# Patient Record
Sex: MALE | Race: WHITE | Employment: UNEMPLOYED | URBAN - METROPOLITAN AREA
[De-identification: names, ages, dates, MRNs, and addresses within clinical notes are randomized per-mention and may not be internally consistent; named-entity substitution may affect disease eponyms.]

---

## 2024-08-11 ENCOUNTER — APPOINTMENT (OUTPATIENT)
Dept: CT IMAGING | Age: 54
End: 2024-08-11
Payer: COMMERCIAL

## 2024-08-11 ENCOUNTER — HOSPITAL ENCOUNTER (INPATIENT)
Age: 54
LOS: 4 days | Discharge: HOME | End: 2024-08-15
Attending: EMERGENCY MEDICINE | Admitting: INTERNAL MEDICINE
Payer: COMMERCIAL

## 2024-08-11 ENCOUNTER — APPOINTMENT (OUTPATIENT)
Dept: GENERAL RADIOLOGY | Age: 54
End: 2024-08-11
Payer: COMMERCIAL

## 2024-08-11 DIAGNOSIS — N17.9 ACUTE RENAL FAILURE, UNSPECIFIED ACUTE RENAL FAILURE TYPE (HCC): Primary | ICD-10-CM

## 2024-08-11 DIAGNOSIS — K85.20 ALCOHOL-INDUCED ACUTE PANCREATITIS, UNSPECIFIED COMPLICATION STATUS: ICD-10-CM

## 2024-08-11 DIAGNOSIS — F10.930 ALCOHOL WITHDRAWAL SYNDROME WITHOUT COMPLICATION (HCC): ICD-10-CM

## 2024-08-11 LAB
ALBUMIN SERPL-MCNC: 4.1 G/DL (ref 3.5–5.2)
ALP SERPL-CCNC: 82 U/L (ref 40–129)
ALT SERPL-CCNC: 34 U/L (ref 0–40)
AMMONIA PLAS-SCNC: 20 UMOL/L (ref 16–60)
AMPHET UR QL SCN: NEGATIVE
ANION GAP SERPL CALCULATED.3IONS-SCNC: 25 MMOL/L (ref 7–16)
APAP SERPL-MCNC: <5 UG/ML (ref 10–30)
AST SERPL-CCNC: 33 U/L (ref 0–39)
B-OH-BUTYR SERPL-MCNC: 3.24 MMOL/L (ref 0.02–0.27)
B-OH-BUTYR SERPL-MCNC: 4.11 MMOL/L (ref 0.02–0.27)
BARBITURATES UR QL SCN: NEGATIVE
BASOPHILS # BLD: 0.02 K/UL (ref 0–0.2)
BASOPHILS NFR BLD: 0 % (ref 0–2)
BENZODIAZ UR QL: NEGATIVE
BILIRUB DIRECT SERPL-MCNC: 0.9 MG/DL (ref 0–0.3)
BILIRUB INDIRECT SERPL-MCNC: 0.6 MG/DL (ref 0–1)
BILIRUB SERPL-MCNC: 1.5 MG/DL (ref 0–1.2)
BILIRUB UR QL STRIP: ABNORMAL
BUN SERPL-MCNC: 76 MG/DL (ref 6–20)
BUPRENORPHINE UR QL: NEGATIVE
CALCIUM SERPL-MCNC: 8.3 MG/DL (ref 8.6–10.2)
CANNABINOIDS UR QL SCN: NEGATIVE
CHLORIDE SERPL-SCNC: 82 MMOL/L (ref 98–107)
CK SERPL-CCNC: 140 U/L (ref 20–200)
CLARITY UR: CLEAR
CO2 SERPL-SCNC: 22 MMOL/L (ref 22–29)
COCAINE UR QL SCN: NEGATIVE
COLOR UR: YELLOW
CREAT SERPL-MCNC: 2 MG/DL (ref 0.7–1.2)
D-DIMER QUANTITATIVE: 1256 NG/ML DDU (ref 0–230)
EKG ATRIAL RATE: 81 BPM
EKG P AXIS: 28 DEGREES
EKG P-R INTERVAL: 144 MS
EKG Q-T INTERVAL: 414 MS
EKG QRS DURATION: 80 MS
EKG QTC CALCULATION (BAZETT): 480 MS
EKG R AXIS: 12 DEGREES
EKG T AXIS: 2 DEGREES
EKG VENTRICULAR RATE: 81 BPM
EOSINOPHIL # BLD: 0.03 K/UL (ref 0.05–0.5)
EOSINOPHILS RELATIVE PERCENT: 0 % (ref 0–6)
ERYTHROCYTE [DISTWIDTH] IN BLOOD BY AUTOMATED COUNT: 12 % (ref 11.5–15)
ETHANOLAMINE SERPL-MCNC: <10 MG/DL (ref 0–0.08)
FENTANYL UR QL: NEGATIVE
GFR, ESTIMATED: 40 ML/MIN/1.73M2
GLUCOSE BLD-MCNC: 159 MG/DL (ref 74–99)
GLUCOSE SERPL-MCNC: 264 MG/DL (ref 74–99)
GLUCOSE UR STRIP-MCNC: 250 MG/DL
HCT VFR BLD AUTO: 32.3 % (ref 37–54)
HGB BLD-MCNC: 11.7 G/DL (ref 12.5–16.5)
HGB UR QL STRIP.AUTO: ABNORMAL
IMM GRANULOCYTES # BLD AUTO: 0.09 K/UL (ref 0–0.58)
IMM GRANULOCYTES NFR BLD: 1 % (ref 0–5)
INR PPP: 1
KETONES UR STRIP-MCNC: 15 MG/DL
LEUKOCYTE ESTERASE UR QL STRIP: NEGATIVE
LIPASE SERPL-CCNC: 939 U/L (ref 13–60)
LYMPHOCYTES NFR BLD: 0.63 K/UL (ref 1.5–4)
LYMPHOCYTES RELATIVE PERCENT: 8 % (ref 20–42)
MAGNESIUM SERPL-MCNC: 1.9 MG/DL (ref 1.6–2.6)
MCH RBC QN AUTO: 34.6 PG (ref 26–35)
MCHC RBC AUTO-ENTMCNC: 36.2 G/DL (ref 32–34.5)
MCV RBC AUTO: 95.6 FL (ref 80–99.9)
METHADONE UR QL: NEGATIVE
MONOCYTES NFR BLD: 1.1 K/UL (ref 0.1–0.95)
MONOCYTES NFR BLD: 14 % (ref 2–12)
NEUTROPHILS NFR BLD: 77 % (ref 43–80)
NEUTS SEG NFR BLD: 6.19 K/UL (ref 1.8–7.3)
NITRITE UR QL STRIP: NEGATIVE
OPIATES UR QL SCN: NEGATIVE
OXYCODONE UR QL SCN: NEGATIVE
PARTIAL THROMBOPLASTIN TIME: 24.1 SEC (ref 24.5–35.1)
PCP UR QL SCN: NEGATIVE
PH UR STRIP: 6 [PH] (ref 5–9)
PH VENOUS: 7.35 (ref 7.35–7.45)
PLATELET # BLD AUTO: 145 K/UL (ref 130–450)
PMV BLD AUTO: 10.1 FL (ref 7–12)
POTASSIUM SERPL-SCNC: 3.1 MMOL/L (ref 3.5–5)
PROT SERPL-MCNC: 7.2 G/DL (ref 6.4–8.3)
PROT UR STRIP-MCNC: ABNORMAL MG/DL
PROTHROMBIN TIME: 10.4 SEC (ref 9.3–12.4)
RBC # BLD AUTO: 3.38 M/UL (ref 3.8–5.8)
RBC #/AREA URNS HPF: NORMAL /HPF
SALICYLATES SERPL-MCNC: <0.3 MG/DL (ref 0–30)
SODIUM SERPL-SCNC: 129 MMOL/L (ref 132–146)
SP GR UR STRIP: 1.01 (ref 1–1.03)
TEST INFORMATION: NORMAL
UROBILINOGEN UR STRIP-ACNC: 1 EU/DL (ref 0–1)
WBC #/AREA URNS HPF: NORMAL /HPF
WBC OTHER # BLD: 8.1 K/UL (ref 4.5–11.5)

## 2024-08-11 PROCEDURE — 6360000002 HC RX W HCPCS: Performed by: EMERGENCY MEDICINE

## 2024-08-11 PROCEDURE — 71045 X-RAY EXAM CHEST 1 VIEW: CPT

## 2024-08-11 PROCEDURE — 71275 CT ANGIOGRAPHY CHEST: CPT

## 2024-08-11 PROCEDURE — G0480 DRUG TEST DEF 1-7 CLASSES: HCPCS

## 2024-08-11 PROCEDURE — 6370000000 HC RX 637 (ALT 250 FOR IP): Performed by: INTERNAL MEDICINE

## 2024-08-11 PROCEDURE — 6370000000 HC RX 637 (ALT 250 FOR IP): Performed by: EMERGENCY MEDICINE

## 2024-08-11 PROCEDURE — 80179 DRUG ASSAY SALICYLATE: CPT

## 2024-08-11 PROCEDURE — 6360000002 HC RX W HCPCS: Performed by: INTERNAL MEDICINE

## 2024-08-11 PROCEDURE — 6360000004 HC RX CONTRAST MEDICATION: Performed by: RADIOLOGY

## 2024-08-11 PROCEDURE — 2580000003 HC RX 258: Performed by: INTERNAL MEDICINE

## 2024-08-11 PROCEDURE — 99285 EMERGENCY DEPT VISIT HI MDM: CPT

## 2024-08-11 PROCEDURE — 2500000003 HC RX 250 WO HCPCS: Performed by: EMERGENCY MEDICINE

## 2024-08-11 PROCEDURE — 85610 PROTHROMBIN TIME: CPT

## 2024-08-11 PROCEDURE — 82140 ASSAY OF AMMONIA: CPT

## 2024-08-11 PROCEDURE — 82010 KETONE BODYS QUAN: CPT

## 2024-08-11 PROCEDURE — 82800 BLOOD PH: CPT

## 2024-08-11 PROCEDURE — 80307 DRUG TEST PRSMV CHEM ANLYZR: CPT

## 2024-08-11 PROCEDURE — 81001 URINALYSIS AUTO W/SCOPE: CPT

## 2024-08-11 PROCEDURE — 93010 ELECTROCARDIOGRAM REPORT: CPT | Performed by: INTERNAL MEDICINE

## 2024-08-11 PROCEDURE — 96374 THER/PROPH/DIAG INJ IV PUSH: CPT

## 2024-08-11 PROCEDURE — 83690 ASSAY OF LIPASE: CPT

## 2024-08-11 PROCEDURE — 80143 DRUG ASSAY ACETAMINOPHEN: CPT

## 2024-08-11 PROCEDURE — 85379 FIBRIN DEGRADATION QUANT: CPT

## 2024-08-11 PROCEDURE — 99223 1ST HOSP IP/OBS HIGH 75: CPT | Performed by: INTERNAL MEDICINE

## 2024-08-11 PROCEDURE — 85025 COMPLETE CBC W/AUTO DIFF WBC: CPT

## 2024-08-11 PROCEDURE — 2060000000 HC ICU INTERMEDIATE R&B

## 2024-08-11 PROCEDURE — 2580000003 HC RX 258: Performed by: EMERGENCY MEDICINE

## 2024-08-11 PROCEDURE — 74177 CT ABD & PELVIS W/CONTRAST: CPT

## 2024-08-11 PROCEDURE — 82248 BILIRUBIN DIRECT: CPT

## 2024-08-11 PROCEDURE — 82550 ASSAY OF CK (CPK): CPT

## 2024-08-11 PROCEDURE — 85730 THROMBOPLASTIN TIME PARTIAL: CPT

## 2024-08-11 PROCEDURE — 93005 ELECTROCARDIOGRAM TRACING: CPT | Performed by: EMERGENCY MEDICINE

## 2024-08-11 PROCEDURE — 96375 TX/PRO/DX INJ NEW DRUG ADDON: CPT

## 2024-08-11 PROCEDURE — 80053 COMPREHEN METABOLIC PANEL: CPT

## 2024-08-11 PROCEDURE — 82962 GLUCOSE BLOOD TEST: CPT

## 2024-08-11 PROCEDURE — 83735 ASSAY OF MAGNESIUM: CPT

## 2024-08-11 RX ORDER — ENOXAPARIN SODIUM 100 MG/ML
40 INJECTION SUBCUTANEOUS DAILY
Status: DISCONTINUED | OUTPATIENT
Start: 2024-08-11 | End: 2024-08-15 | Stop reason: HOSPADM

## 2024-08-11 RX ORDER — PHENOBARBITAL SODIUM 65 MG/ML
32.5 INJECTION, SOLUTION INTRAMUSCULAR; INTRAVENOUS EVERY 6 HOURS PRN
Status: DISCONTINUED | OUTPATIENT
Start: 2024-08-12 | End: 2024-08-11

## 2024-08-11 RX ORDER — SODIUM CHLORIDE 0.9 % (FLUSH) 0.9 %
5-40 SYRINGE (ML) INJECTION PRN
Status: DISCONTINUED | OUTPATIENT
Start: 2024-08-11 | End: 2024-08-15 | Stop reason: HOSPADM

## 2024-08-11 RX ORDER — ONDANSETRON 4 MG/1
4 TABLET, ORALLY DISINTEGRATING ORAL EVERY 8 HOURS PRN
Status: DISCONTINUED | OUTPATIENT
Start: 2024-08-11 | End: 2024-08-15 | Stop reason: HOSPADM

## 2024-08-11 RX ORDER — LORAZEPAM 2 MG/ML
1 INJECTION INTRAMUSCULAR
Status: DISCONTINUED | OUTPATIENT
Start: 2024-08-11 | End: 2024-08-15 | Stop reason: HOSPADM

## 2024-08-11 RX ORDER — LORAZEPAM 1 MG/1
3 TABLET ORAL
Status: DISCONTINUED | OUTPATIENT
Start: 2024-08-11 | End: 2024-08-15 | Stop reason: HOSPADM

## 2024-08-11 RX ORDER — FOLIC ACID 1 MG/1
1 TABLET ORAL ONCE
Status: COMPLETED | OUTPATIENT
Start: 2024-08-11 | End: 2024-08-11

## 2024-08-11 RX ORDER — 0.9 % SODIUM CHLORIDE 0.9 %
1000 INTRAVENOUS SOLUTION INTRAVENOUS ONCE
Status: COMPLETED | OUTPATIENT
Start: 2024-08-11 | End: 2024-08-11

## 2024-08-11 RX ORDER — DEXTROSE MONOHYDRATE 100 MG/ML
INJECTION, SOLUTION INTRAVENOUS CONTINUOUS PRN
Status: DISCONTINUED | OUTPATIENT
Start: 2024-08-11 | End: 2024-08-15 | Stop reason: HOSPADM

## 2024-08-11 RX ORDER — PHENOBARBITAL SODIUM 65 MG/ML
65 INJECTION, SOLUTION INTRAMUSCULAR; INTRAVENOUS EVERY 6 HOURS
Status: COMPLETED | OUTPATIENT
Start: 2024-08-11 | End: 2024-08-12

## 2024-08-11 RX ORDER — PHENOBARBITAL SODIUM 65 MG/ML
32.5 INJECTION, SOLUTION INTRAMUSCULAR; INTRAVENOUS EVERY 6 HOURS
Status: COMPLETED | OUTPATIENT
Start: 2024-08-12 | End: 2024-08-13

## 2024-08-11 RX ORDER — LORAZEPAM 1 MG/1
1 TABLET ORAL
Status: DISCONTINUED | OUTPATIENT
Start: 2024-08-11 | End: 2024-08-15 | Stop reason: HOSPADM

## 2024-08-11 RX ORDER — MAGNESIUM SULFATE IN WATER 40 MG/ML
2000 INJECTION, SOLUTION INTRAVENOUS PRN
Status: DISCONTINUED | OUTPATIENT
Start: 2024-08-11 | End: 2024-08-15 | Stop reason: HOSPADM

## 2024-08-11 RX ORDER — THIAMINE HYDROCHLORIDE 100 MG/ML
100 INJECTION, SOLUTION INTRAMUSCULAR; INTRAVENOUS DAILY
Status: DISCONTINUED | OUTPATIENT
Start: 2024-08-11 | End: 2024-08-15 | Stop reason: HOSPADM

## 2024-08-11 RX ORDER — POLYETHYLENE GLYCOL 3350 17 G/17G
17 POWDER, FOR SOLUTION ORAL DAILY PRN
Status: DISCONTINUED | OUTPATIENT
Start: 2024-08-11 | End: 2024-08-15 | Stop reason: HOSPADM

## 2024-08-11 RX ORDER — ONDANSETRON 2 MG/ML
4 INJECTION INTRAMUSCULAR; INTRAVENOUS EVERY 6 HOURS PRN
Status: DISCONTINUED | OUTPATIENT
Start: 2024-08-11 | End: 2024-08-15 | Stop reason: HOSPADM

## 2024-08-11 RX ORDER — LORAZEPAM 2 MG/ML
1 INJECTION INTRAMUSCULAR ONCE
Status: COMPLETED | OUTPATIENT
Start: 2024-08-11 | End: 2024-08-11

## 2024-08-11 RX ORDER — GLUCAGON 1 MG/ML
1 KIT INJECTION PRN
Status: DISCONTINUED | OUTPATIENT
Start: 2024-08-11 | End: 2024-08-15 | Stop reason: HOSPADM

## 2024-08-11 RX ORDER — SODIUM CHLORIDE 0.9 % (FLUSH) 0.9 %
5-40 SYRINGE (ML) INJECTION EVERY 12 HOURS SCHEDULED
Status: DISCONTINUED | OUTPATIENT
Start: 2024-08-11 | End: 2024-08-15 | Stop reason: HOSPADM

## 2024-08-11 RX ORDER — MORPHINE SULFATE 4 MG/ML
4 INJECTION, SOLUTION INTRAMUSCULAR; INTRAVENOUS
Status: DISCONTINUED | OUTPATIENT
Start: 2024-08-11 | End: 2024-08-15 | Stop reason: HOSPADM

## 2024-08-11 RX ORDER — PHENOBARBITAL SODIUM 65 MG/ML
32.5 INJECTION, SOLUTION INTRAMUSCULAR; INTRAVENOUS EVERY 12 HOURS
Status: COMPLETED | OUTPATIENT
Start: 2024-08-13 | End: 2024-08-14

## 2024-08-11 RX ORDER — PHENOBARBITAL SODIUM 65 MG/ML
16.2 INJECTION, SOLUTION INTRAMUSCULAR; INTRAVENOUS EVERY 6 HOURS PRN
Status: DISCONTINUED | OUTPATIENT
Start: 2024-08-14 | End: 2024-08-11

## 2024-08-11 RX ORDER — PHENOBARBITAL SODIUM 65 MG/ML
65 INJECTION, SOLUTION INTRAMUSCULAR; INTRAVENOUS EVERY 6 HOURS PRN
Status: DISCONTINUED | OUTPATIENT
Start: 2024-08-11 | End: 2024-08-11

## 2024-08-11 RX ORDER — ACETAMINOPHEN 325 MG/1
650 TABLET ORAL EVERY 6 HOURS PRN
Status: DISCONTINUED | OUTPATIENT
Start: 2024-08-11 | End: 2024-08-15 | Stop reason: HOSPADM

## 2024-08-11 RX ORDER — CHLORPROMAZINE HYDROCHLORIDE 25 MG/1
25 TABLET, FILM COATED ORAL 3 TIMES DAILY
Status: COMPLETED | OUTPATIENT
Start: 2024-08-11 | End: 2024-08-13

## 2024-08-11 RX ORDER — LORAZEPAM 2 MG/ML
3 INJECTION INTRAMUSCULAR
Status: DISCONTINUED | OUTPATIENT
Start: 2024-08-11 | End: 2024-08-15 | Stop reason: HOSPADM

## 2024-08-11 RX ORDER — LORAZEPAM 1 MG/1
2 TABLET ORAL
Status: DISCONTINUED | OUTPATIENT
Start: 2024-08-11 | End: 2024-08-15 | Stop reason: HOSPADM

## 2024-08-11 RX ORDER — SODIUM CHLORIDE 9 MG/ML
INJECTION, SOLUTION INTRAVENOUS PRN
Status: DISCONTINUED | OUTPATIENT
Start: 2024-08-11 | End: 2024-08-15 | Stop reason: HOSPADM

## 2024-08-11 RX ORDER — PHENOBARBITAL SODIUM 65 MG/ML
16.2 INJECTION, SOLUTION INTRAMUSCULAR; INTRAVENOUS EVERY 12 HOURS
Status: DISCONTINUED | OUTPATIENT
Start: 2024-08-14 | End: 2024-08-14

## 2024-08-11 RX ORDER — ONDANSETRON 2 MG/ML
4 INJECTION INTRAMUSCULAR; INTRAVENOUS ONCE
Status: COMPLETED | OUTPATIENT
Start: 2024-08-11 | End: 2024-08-11

## 2024-08-11 RX ORDER — POTASSIUM CHLORIDE 7.45 MG/ML
10 INJECTION INTRAVENOUS PRN
Status: DISCONTINUED | OUTPATIENT
Start: 2024-08-11 | End: 2024-08-15 | Stop reason: HOSPADM

## 2024-08-11 RX ORDER — LORAZEPAM 1 MG/1
4 TABLET ORAL
Status: DISCONTINUED | OUTPATIENT
Start: 2024-08-11 | End: 2024-08-15 | Stop reason: HOSPADM

## 2024-08-11 RX ORDER — LORAZEPAM 2 MG/ML
2 INJECTION INTRAMUSCULAR
Status: DISCONTINUED | OUTPATIENT
Start: 2024-08-11 | End: 2024-08-15 | Stop reason: HOSPADM

## 2024-08-11 RX ORDER — INSULIN LISPRO 100 [IU]/ML
0-4 INJECTION, SOLUTION INTRAVENOUS; SUBCUTANEOUS NIGHTLY
Status: DISCONTINUED | OUTPATIENT
Start: 2024-08-11 | End: 2024-08-15 | Stop reason: HOSPADM

## 2024-08-11 RX ORDER — GAUZE BANDAGE 2" X 2"
100 BANDAGE TOPICAL ONCE
Status: COMPLETED | OUTPATIENT
Start: 2024-08-11 | End: 2024-08-11

## 2024-08-11 RX ORDER — LORAZEPAM 2 MG/ML
4 INJECTION INTRAMUSCULAR
Status: DISCONTINUED | OUTPATIENT
Start: 2024-08-11 | End: 2024-08-15 | Stop reason: HOSPADM

## 2024-08-11 RX ORDER — SODIUM CHLORIDE AND POTASSIUM CHLORIDE 300; 900 MG/100ML; MG/100ML
INJECTION, SOLUTION INTRAVENOUS CONTINUOUS
Status: DISCONTINUED | OUTPATIENT
Start: 2024-08-11 | End: 2024-08-12

## 2024-08-11 RX ORDER — MORPHINE SULFATE 2 MG/ML
2 INJECTION, SOLUTION INTRAMUSCULAR; INTRAVENOUS
Status: DISCONTINUED | OUTPATIENT
Start: 2024-08-11 | End: 2024-08-15 | Stop reason: HOSPADM

## 2024-08-11 RX ORDER — POTASSIUM CHLORIDE 20 MEQ/1
40 TABLET, EXTENDED RELEASE ORAL PRN
Status: DISCONTINUED | OUTPATIENT
Start: 2024-08-11 | End: 2024-08-15 | Stop reason: HOSPADM

## 2024-08-11 RX ORDER — ACETAMINOPHEN 325 MG/1
650 TABLET ORAL EVERY 4 HOURS PRN
Status: DISCONTINUED | OUTPATIENT
Start: 2024-08-11 | End: 2024-08-15 | Stop reason: HOSPADM

## 2024-08-11 RX ORDER — ACETAMINOPHEN 650 MG/1
650 SUPPOSITORY RECTAL EVERY 6 HOURS PRN
Status: DISCONTINUED | OUTPATIENT
Start: 2024-08-11 | End: 2024-08-15 | Stop reason: HOSPADM

## 2024-08-11 RX ORDER — INSULIN LISPRO 100 [IU]/ML
0-4 INJECTION, SOLUTION INTRAVENOUS; SUBCUTANEOUS
Status: DISCONTINUED | OUTPATIENT
Start: 2024-08-12 | End: 2024-08-15 | Stop reason: HOSPADM

## 2024-08-11 RX ORDER — GUAIFENESIN 200 MG/10ML
200 LIQUID ORAL ONCE
Status: COMPLETED | OUTPATIENT
Start: 2024-08-11 | End: 2024-08-11

## 2024-08-11 RX ADMIN — SODIUM CHLORIDE, PRESERVATIVE FREE 10 ML: 5 INJECTION INTRAVENOUS at 21:16

## 2024-08-11 RX ADMIN — PHENOBARBITAL SODIUM 65 MG: 65 INJECTION, SOLUTION INTRAMUSCULAR; INTRAVENOUS at 23:49

## 2024-08-11 RX ADMIN — SODIUM CHLORIDE 1000 ML: 9 INJECTION, SOLUTION INTRAVENOUS at 10:55

## 2024-08-11 RX ADMIN — ENOXAPARIN SODIUM 40 MG: 100 INJECTION SUBCUTANEOUS at 19:09

## 2024-08-11 RX ADMIN — SODIUM CHLORIDE 1000 ML: 9 INJECTION, SOLUTION INTRAVENOUS at 09:11

## 2024-08-11 RX ADMIN — POTASSIUM CHLORIDE AND SODIUM CHLORIDE: 900; 300 INJECTION, SOLUTION INTRAVENOUS at 14:14

## 2024-08-11 RX ADMIN — POTASSIUM CHLORIDE AND SODIUM CHLORIDE: 900; 300 INJECTION, SOLUTION INTRAVENOUS at 19:04

## 2024-08-11 RX ADMIN — THIAMINE HYDROCHLORIDE 100 MG: 100 INJECTION, SOLUTION INTRAMUSCULAR; INTRAVENOUS at 19:09

## 2024-08-11 RX ADMIN — GUAIFENESIN 200 MG: 200 SOLUTION ORAL at 09:15

## 2024-08-11 RX ADMIN — Medication 100 MG: at 10:55

## 2024-08-11 RX ADMIN — IOPAMIDOL 75 ML: 755 INJECTION, SOLUTION INTRAVENOUS at 11:26

## 2024-08-11 RX ADMIN — ONDANSETRON 4 MG: 2 INJECTION INTRAMUSCULAR; INTRAVENOUS at 09:14

## 2024-08-11 RX ADMIN — FOLIC ACID 1 MG: 1 TABLET ORAL at 10:55

## 2024-08-11 RX ADMIN — PHENOBARBITAL SODIUM 65 MG: 65 INJECTION, SOLUTION INTRAMUSCULAR; INTRAVENOUS at 19:08

## 2024-08-11 RX ADMIN — SODIUM CHLORIDE 1000 ML: 9 INJECTION, SOLUTION INTRAVENOUS at 09:13

## 2024-08-11 RX ADMIN — ACETAMINOPHEN 650 MG: 325 TABLET ORAL at 19:11

## 2024-08-11 RX ADMIN — CHLORPROMAZINE HYDROCHLORIDE 25 MG: 25 TABLET, FILM COATED ORAL at 21:16

## 2024-08-11 RX ADMIN — LORAZEPAM 1 MG: 2 INJECTION INTRAMUSCULAR; INTRAVENOUS at 09:14

## 2024-08-11 RX ADMIN — POTASSIUM CHLORIDE 40 MEQ: 1500 TABLET, EXTENDED RELEASE ORAL at 19:09

## 2024-08-11 ASSESSMENT — PAIN DESCRIPTION - PAIN TYPE: TYPE: ACUTE PAIN

## 2024-08-11 ASSESSMENT — PAIN DESCRIPTION - DESCRIPTORS: DESCRIPTORS: ACHING;THROBBING

## 2024-08-11 ASSESSMENT — PAIN DESCRIPTION - LOCATION: LOCATION: ABDOMEN;BACK;COCCYX

## 2024-08-11 ASSESSMENT — PAIN SCALES - GENERAL: PAINLEVEL_OUTOF10: 7

## 2024-08-11 NOTE — ED PROVIDER NOTES
4.11 (H) 0.02 - 0.27 mmol/L   CK   Result Value Ref Range    Total  20 - 200 U/L   Acetaminophen Level   Result Value Ref Range    Acetaminophen Level <5 (L) 10.0 - 30.0 ug/mL   Ethanol   Result Value Ref Range    Ethanol Lvl <10 <10 mg/dL   Salicylate   Result Value Ref Range    Salicylate Lvl <0.3 0.0 - 30.0 mg/dL   Lipase   Result Value Ref Range    Lipase 939 (H) 13 - 60 U/L   Hepatic Function Panel   Result Value Ref Range    Albumin 4.1 3.5 - 5.2 g/dL    Alkaline Phosphatase 82 40 - 129 U/L    ALT 34 0 - 40 U/L    AST 33 0 - 39 U/L    Total Bilirubin 1.5 (H) 0.0 - 1.2 mg/dL    Bilirubin, Direct 0.9 (H) 0.0 - 0.3 mg/dL    Bilirubin, Indirect 0.6 0.0 - 1.0 mg/dL    Total Protein 7.2 6.4 - 8.3 g/dL   D-Dimer, Quantitative   Result Value Ref Range    D-Dimer, Quant 1,256 (H) 0 - 230 ng/mL DDU   APTT   Result Value Ref Range    APTT 24.1 (L) 24.5 - 35.1 sec   Protime-INR   Result Value Ref Range    Protime 10.4 9.3 - 12.4 sec    INR 1.0    Ammonia   Result Value Ref Range    Ammonia 20 16.0 - 60.0 umol/L   EKG 12 Lead   Result Value Ref Range    Ventricular Rate 81 BPM    Atrial Rate 81 BPM    P-R Interval 144 ms    QRS Duration 80 ms    Q-T Interval 414 ms    QTc Calculation (Bazett) 480 ms    P Axis 28 degrees    R Axis 12 degrees    T Axis 2 degrees       Radiology  CTA CHEST W CONTRAST   Final Result   1. There is no pulmonary embolus   2. Advanced hepatic steatosis   3. Only the superior aspect of the body of the pancreas is included on the   examination.  There are no gross findings of acute pancreatitis of the body   of the pancreas.   4. .         CT ABDOMEN PELVIS W IV CONTRAST Additional Contrast? None   Final Result   1. Findings consistent with interstitial pancreatitis.   2. Severe fatty liver.   3. Mild nonspecific distal esophageal wall thickening.   4. Mildly enlarged prostate.         XR CHEST PORTABLE   Final Result   No acute process.               ------------------------- NURSING NOTES  AND VITALS REVIEWED ---------------------------  Date / Time Roomed:  8/11/2024  7:57 AM  ED Bed Assignment:  09/09    The nursing notes within the ED encounter and vital signs as below have been reviewed.   Patient Vitals for the past 24 hrs:   BP Temp Pulse Resp SpO2 Height Weight   08/11/24 1202 119/74 -- 94 20 -- -- --   08/11/24 1131 125/65 -- 91 19 97 % -- --   08/11/24 1031 126/69 -- 74 16 96 % -- --   08/11/24 1002 -- -- -- 19 -- -- --   08/11/24 1001 114/62 -- 72 -- 97 % -- --   08/11/24 0931 107/68 -- 76 19 96 % -- --   08/11/24 0901 104/63 -- 89 16 96 % -- --   08/11/24 0812 95/64 98.2 °F (36.8 °C) 84 -- 96 % -- --   08/11/24 0811 -- -- -- 18 -- 1.702 m (5' 7\") 81.6 kg (180 lb)       Oxygen Saturation Interpretation: Normal      ------------------------------------------ PROGRESS NOTES -------------------------    I have spoken with the patient and discussed today’s results, in addition to providing specific details for the plan of care and counseling regarding the diagnosis and prognosis.  Their questions are answered at this time and they are agreeable with the plan.      --------------------------------- ADDITIONAL PROVIDER NOTES ---------------------------------  Consultations:    This patient's ED course included: a personal history and physicial examination, re-evaluation prior to disposition, multiple bedside re-evaluations, IV medications, cardiac monitoring, continuous pulse oximetry, and complex medical decision making and emergency management    This patient has remained hemodynamically stable, improved, and been closely monitored during their ED course.    Please note that the withdrawal or failure to initiate urgent interventions for this patient would likely result in a life threatening deterioration or permanent disability.      Accordingly this patient received 35 minutes of critical care time, excluding separately billable procedures. Systems at risk for deterioration include:

## 2024-08-11 NOTE — PLAN OF CARE
Problem: Discharge Planning  Goal: Discharge to home or other facility with appropriate resources  Outcome: Progressing  Flowsheets  Taken 8/11/2024 1857  Discharge to home or other facility with appropriate resources:   Identify barriers to discharge with patient and caregiver   Arrange for needed discharge resources and transportation as appropriate  Taken 8/11/2024 1845  Discharge to home or other facility with appropriate resources:   Identify barriers to discharge with patient and caregiver   Arrange for needed discharge resources and transportation as appropriate     Problem: Pain  Goal: Verbalizes/displays adequate comfort level or baseline comfort level  Outcome: Progressing  Flowsheets (Taken 8/11/2024 1845)  Verbalizes/displays adequate comfort level or baseline comfort level: Encourage patient to monitor pain and request assistance     Problem: Skin/Tissue Integrity - Adult  Goal: Skin integrity remains intact  Outcome: Progressing     Problem: Musculoskeletal - Adult  Goal: Return mobility to safest level of function  Outcome: Progressing     Problem: Gastrointestinal - Adult  Goal: Minimal or absence of nausea and vomiting  Outcome: Progressing     Problem: Metabolic/Fluid and Electrolytes - Adult  Goal: Electrolytes maintained within normal limits  Outcome: Progressing     Problem: Neurosensory - Adult  Goal: Absence of seizures  Outcome: Progressing  Goal: Achieves maximal functionality and self care  Outcome: Progressing  Goal: Achieves stable or improved neurological status  Outcome: Progressing     Problem: Respiratory - Adult  Goal: Achieves optimal ventilation and oxygenation  Outcome: Progressing     Problem: Cardiovascular - Adult  Goal: Maintains optimal cardiac output and hemodynamic stability  Outcome: Progressing     Problem: Genitourinary - Adult  Goal: Absence of urinary retention  Outcome: Progressing     Problem: Infection - Adult  Goal: Absence of infection at discharge  Outcome:  Progressing     Problem: Hematologic - Adult  Goal: Maintains hematologic stability  Outcome: Progressing

## 2024-08-11 NOTE — PROGRESS NOTES
4 Eyes Skin Assessment     NAME:  Jose Wesley  YOB: 1970  MEDICAL RECORD NUMBER:  [id number]    The patient is being assessed for  Admission    I agree that at least one RN has performed a thorough Head to Toe Skin Assessment on the patient. ALL assessment sites listed below have been assessed.      Areas assessed by both nurses:    Head, Face, Ears, Shoulders, Back, Chest, Arms, Elbows, Hands, Sacrum. Buttock, Coccyx, Ischium, Legs. Feet and Heels, and Under Medical Devices         Does the Patient have a Wound? Yes wound(s) were present on assessment. LDA wound assessment was Initiated and completed by RN       Left elbow abrasion.  Middle back abrasion.  Jemima and buttocks area excoriated, no open areas, skin blanches.  Leobardo Prevention initiated by RN: Yes  Wound Care Orders initiated by RN: No    Pressure Injury (Stage 3,4, Unstageable, DTI, NWPT, and Complex wounds) if present, place Wound referral order by RN under : No    New Ostomies, if present place, Ostomy referral order under : No     Nurse 1 eSignature: Electronically signed by Abdiel Tanner RN on 8/11/24 at 7:24 PM EDT    **SHARE this note so that the co-signing nurse can place an eSignature**    Nurse 2 eSignature: Electronically signed by Zelalem Garzon RN on 8/11/24 at 7:53 PM EDT

## 2024-08-11 NOTE — H&P
[facility]   History and Physical      CHIEF COMPLAINT: Alcohol withdrawal.    History of Present Illness:  54 y.o. male with a history of type II DM, chronic alcoholism, fatty liver, HTN, HLD who presents with symptoms of alcohol withdrawal.  Patient admits to drinking 1.75 L of diabetic vodka (40 proof) every 2 days.  He stopped drinking on Wednesday as he has developed abdominal pain was having hiccups he developed hiccups the day prior.   In the ER, workup revealed acute pancreatitis with lipase of 939.  CT scan revealed interstitial pancreatitis and severe fatty liver.  Patient received 1 mg of Ativan on presentation because he was tremulous and was given normal saline bolus 1000 mL x 3.  Patient recommended for hospital admission as patient with prior history of DTs.  Patient states the end of December he was hospitalized for alcohol withdrawal and had DTs.  Patient moved from [place] back to [place] in early April.    Informant(s) for H&P:patient and EMR    REVIEW OF SYSTEMS:  As per HPI, otherwise negative.      PMH:  Past Medical History:   Diagnosis Date    Diabetes mellitus (HCC)     Hyperlipidemia     Hypertension        Surgical History:  No past surgical history on file.    Medications Prior to Admission:    Prior to Admission medications    Not on File       Allergies:    Patient has no known allergies.    Social History:    reports current alcohol use of about 28.0 standard drinks of alcohol per week.      Family History:   family history is not on file. Reviewed and non-contributory       PHYSICAL EXAM:  Vitals:  /63   Pulse 94   Temp 98.2 °F (36.8 °C)   Resp 20   Ht 1.702 m (5' 7\")   Wt 81.6 kg (180 lb)   SpO2 97%   BMI 28.19 kg/m²   General Appearance: alert and oriented to person, place and time and in no acute distress; patient frequent hiccups  Skin: warm and dry  Head: normocephalic and atraumatic  Eyes: pupils equal, round, and reactive to

## 2024-08-11 NOTE — ED NOTES
Patient states unable to get a urine sample at this time. Aware of need for sample and will notify nurse when able to go.   Xr called for portable

## 2024-08-12 ENCOUNTER — APPOINTMENT (OUTPATIENT)
Dept: MRI IMAGING | Age: 54
End: 2024-08-12
Payer: COMMERCIAL

## 2024-08-12 PROBLEM — E87.29 ALCOHOLIC KETOACIDOSIS: Status: ACTIVE | Noted: 2024-08-12

## 2024-08-12 PROBLEM — F10.930 ALCOHOL WITHDRAWAL SYNDROME WITHOUT COMPLICATION (HCC): Status: ACTIVE | Noted: 2024-08-12

## 2024-08-12 PROBLEM — E11.65 TYPE 2 DIABETES MELLITUS WITH HYPERGLYCEMIA, WITHOUT LONG-TERM CURRENT USE OF INSULIN (HCC): Status: ACTIVE | Noted: 2024-08-12

## 2024-08-12 PROBLEM — K70.0 ALCOHOLIC FATTY LIVER: Status: ACTIVE | Noted: 2024-08-12

## 2024-08-12 PROBLEM — F10.20 SEVERE ALCOHOL USE DISORDER (HCC): Status: ACTIVE | Noted: 2024-08-12

## 2024-08-12 PROBLEM — E87.6 HYPOKALEMIA: Status: ACTIVE | Noted: 2024-08-12

## 2024-08-12 PROBLEM — R06.6 INTRACTABLE HICCUPS: Status: ACTIVE | Noted: 2024-08-12

## 2024-08-12 PROBLEM — K85.20 ALCOHOL-INDUCED ACUTE PANCREATITIS WITHOUT INFECTION OR NECROSIS: Status: ACTIVE | Noted: 2024-08-12

## 2024-08-12 LAB
ALBUMIN SERPL-MCNC: 3.4 G/DL (ref 3.5–5.2)
ALP SERPL-CCNC: 71 U/L (ref 40–129)
ALT SERPL-CCNC: 26 U/L (ref 0–40)
ANION GAP SERPL CALCULATED.3IONS-SCNC: 16 MMOL/L (ref 7–16)
AST SERPL-CCNC: 38 U/L (ref 0–39)
B-OH-BUTYR SERPL-MCNC: 3.33 MMOL/L (ref 0.02–0.27)
BASOPHILS # BLD: 0.01 K/UL (ref 0–0.2)
BASOPHILS NFR BLD: 0 % (ref 0–2)
BILIRUB SERPL-MCNC: 1.2 MG/DL (ref 0–1.2)
BUN SERPL-MCNC: 36 MG/DL (ref 6–20)
CALCIUM SERPL-MCNC: 7.8 MG/DL (ref 8.6–10.2)
CHLORIDE SERPL-SCNC: 96 MMOL/L (ref 98–107)
CO2 SERPL-SCNC: 22 MMOL/L (ref 22–29)
CREAT SERPL-MCNC: 1 MG/DL (ref 0.7–1.2)
EOSINOPHIL # BLD: 0.06 K/UL (ref 0.05–0.5)
EOSINOPHILS RELATIVE PERCENT: 1 % (ref 0–6)
ERYTHROCYTE [DISTWIDTH] IN BLOOD BY AUTOMATED COUNT: 12.2 % (ref 11.5–15)
GFR, ESTIMATED: >90 ML/MIN/1.73M2
GLUCOSE BLD-MCNC: 170 MG/DL (ref 74–99)
GLUCOSE BLD-MCNC: 177 MG/DL (ref 74–99)
GLUCOSE BLD-MCNC: 180 MG/DL (ref 74–99)
GLUCOSE BLD-MCNC: 298 MG/DL (ref 74–99)
GLUCOSE SERPL-MCNC: 167 MG/DL (ref 74–99)
HBA1C MFR BLD: 6.7 % (ref 4–5.6)
HBA1C MFR BLD: 7 % (ref 4–5.6)
HCT VFR BLD AUTO: 29.6 % (ref 37–54)
HGB BLD-MCNC: 10.5 G/DL (ref 12.5–16.5)
IMM GRANULOCYTES # BLD AUTO: 0.09 K/UL (ref 0–0.58)
IMM GRANULOCYTES NFR BLD: 2 % (ref 0–5)
LIPASE SERPL-CCNC: 861 U/L (ref 13–60)
LYMPHOCYTES NFR BLD: 0.58 K/UL (ref 1.5–4)
LYMPHOCYTES RELATIVE PERCENT: 12 % (ref 20–42)
MAGNESIUM SERPL-MCNC: 1.6 MG/DL (ref 1.6–2.6)
MAGNESIUM SERPL-MCNC: 2 MG/DL (ref 1.6–2.6)
MCH RBC QN AUTO: 34.4 PG (ref 26–35)
MCHC RBC AUTO-ENTMCNC: 35.5 G/DL (ref 32–34.5)
MCV RBC AUTO: 97 FL (ref 80–99.9)
MONOCYTES NFR BLD: 0.86 K/UL (ref 0.1–0.95)
MONOCYTES NFR BLD: 18 % (ref 2–12)
NEUTROPHILS NFR BLD: 66 % (ref 43–80)
NEUTS SEG NFR BLD: 3.15 K/UL (ref 1.8–7.3)
PLATELET # BLD AUTO: 144 K/UL (ref 130–450)
PMV BLD AUTO: 10.2 FL (ref 7–12)
POTASSIUM SERPL-SCNC: 3.7 MMOL/L (ref 3.5–5)
PROT SERPL-MCNC: 5.9 G/DL (ref 6.4–8.3)
RBC # BLD AUTO: 3.05 M/UL (ref 3.8–5.8)
RBC # BLD: ABNORMAL 10*6/UL
SODIUM SERPL-SCNC: 134 MMOL/L (ref 132–146)
WBC OTHER # BLD: 4.8 K/UL (ref 4.5–11.5)

## 2024-08-12 PROCEDURE — 85025 COMPLETE CBC W/AUTO DIFF WBC: CPT

## 2024-08-12 PROCEDURE — 83735 ASSAY OF MAGNESIUM: CPT

## 2024-08-12 PROCEDURE — 99254 IP/OBS CNSLTJ NEW/EST MOD 60: CPT | Performed by: STUDENT IN AN ORGANIZED HEALTH CARE EDUCATION/TRAINING PROGRAM

## 2024-08-12 PROCEDURE — 6360000002 HC RX W HCPCS: Performed by: INTERNAL MEDICINE

## 2024-08-12 PROCEDURE — 2580000003 HC RX 258: Performed by: INTERNAL MEDICINE

## 2024-08-12 PROCEDURE — 6370000000 HC RX 637 (ALT 250 FOR IP): Performed by: INTERNAL MEDICINE

## 2024-08-12 PROCEDURE — 82962 GLUCOSE BLOOD TEST: CPT

## 2024-08-12 PROCEDURE — A9577 INJ MULTIHANCE: HCPCS | Performed by: RADIOLOGY

## 2024-08-12 PROCEDURE — APPSS60 APP SPLIT SHARED TIME 46-60 MINUTES: Performed by: CLINICAL NURSE SPECIALIST

## 2024-08-12 PROCEDURE — 80053 COMPREHEN METABOLIC PANEL: CPT

## 2024-08-12 PROCEDURE — 6360000004 HC RX CONTRAST MEDICATION: Performed by: RADIOLOGY

## 2024-08-12 PROCEDURE — 83036 HEMOGLOBIN GLYCOSYLATED A1C: CPT

## 2024-08-12 PROCEDURE — 99233 SBSQ HOSP IP/OBS HIGH 50: CPT | Performed by: INTERNAL MEDICINE

## 2024-08-12 PROCEDURE — 83690 ASSAY OF LIPASE: CPT

## 2024-08-12 PROCEDURE — 82010 KETONE BODYS QUAN: CPT

## 2024-08-12 PROCEDURE — 74183 MRI ABD W/O CNTR FLWD CNTR: CPT

## 2024-08-12 PROCEDURE — 36415 COLL VENOUS BLD VENIPUNCTURE: CPT

## 2024-08-12 PROCEDURE — 2060000000 HC ICU INTERMEDIATE R&B

## 2024-08-12 RX ORDER — SODIUM CHLORIDE, SODIUM LACTATE, POTASSIUM CHLORIDE, CALCIUM CHLORIDE 600; 310; 30; 20 MG/100ML; MG/100ML; MG/100ML; MG/100ML
INJECTION, SOLUTION INTRAVENOUS CONTINUOUS
Status: DISCONTINUED | OUTPATIENT
Start: 2024-08-12 | End: 2024-08-13

## 2024-08-12 RX ADMIN — SODIUM CHLORIDE, PRESERVATIVE FREE 10 ML: 5 INJECTION INTRAVENOUS at 20:59

## 2024-08-12 RX ADMIN — SODIUM CHLORIDE, POTASSIUM CHLORIDE, SODIUM LACTATE AND CALCIUM CHLORIDE: 600; 310; 30; 20 INJECTION, SOLUTION INTRAVENOUS at 13:19

## 2024-08-12 RX ADMIN — POTASSIUM CHLORIDE AND SODIUM CHLORIDE: 900; 300 INJECTION, SOLUTION INTRAVENOUS at 09:58

## 2024-08-12 RX ADMIN — PHENOBARBITAL SODIUM 32.5 MG: 65 INJECTION, SOLUTION INTRAMUSCULAR; INTRAVENOUS at 17:30

## 2024-08-12 RX ADMIN — GADOBENATE DIMEGLUMINE 18 ML: 529 INJECTION, SOLUTION INTRAVENOUS at 12:27

## 2024-08-12 RX ADMIN — CHLORPROMAZINE HYDROCHLORIDE 25 MG: 25 TABLET, FILM COATED ORAL at 20:59

## 2024-08-12 RX ADMIN — THIAMINE HYDROCHLORIDE 100 MG: 100 INJECTION, SOLUTION INTRAMUSCULAR; INTRAVENOUS at 09:59

## 2024-08-12 RX ADMIN — ENOXAPARIN SODIUM 40 MG: 100 INJECTION SUBCUTANEOUS at 09:59

## 2024-08-12 RX ADMIN — CHLORPROMAZINE HYDROCHLORIDE 25 MG: 25 TABLET, FILM COATED ORAL at 10:49

## 2024-08-12 RX ADMIN — PHENOBARBITAL SODIUM 65 MG: 65 INJECTION, SOLUTION INTRAMUSCULAR; INTRAVENOUS at 13:20

## 2024-08-12 RX ADMIN — CHLORPROMAZINE HYDROCHLORIDE 25 MG: 25 TABLET, FILM COATED ORAL at 15:22

## 2024-08-12 RX ADMIN — PHENOBARBITAL SODIUM 65 MG: 65 INJECTION, SOLUTION INTRAMUSCULAR; INTRAVENOUS at 05:52

## 2024-08-12 RX ADMIN — MAGNESIUM SULFATE HEPTAHYDRATE 2000 MG: 40 INJECTION, SOLUTION INTRAVENOUS at 16:23

## 2024-08-12 ASSESSMENT — PAIN SCALES - GENERAL
PAINLEVEL_OUTOF10: 0

## 2024-08-12 ASSESSMENT — ENCOUNTER SYMPTOMS
ABDOMINAL PAIN: 1
COUGH: 1
SHORTNESS OF BREATH: 1
VOMITING: 1
CONSTIPATION: 1
NAUSEA: 1

## 2024-08-12 NOTE — CONSULTS
healthy, 1 with joint disease, 1 with diabetes type 2.  1 brother living with diabetes and alcohol abuse.    Social History     Socioeconomic History    Marital status: Single     Spouse name: Not on file    Number of children: Not on file    Years of education: Not on file    Highest education level: Not on file   Occupational History    Not on file   Tobacco Use    Smoking status: Never    Smokeless tobacco: Never   Substance and Sexual Activity    Alcohol use: Yes     Alcohol/week: 28.0 standard drinks of alcohol     Types: 28 Standard drinks or equivalent per week     Comment: 4 glass vodka/day x 2 months    Drug use: Never    Sexual activity: Not on file   Other Topics Concern    Not on file   Social History Narrative    Not on file     Social Determinants of Health     Financial Resource Strain: Not on file   Food Insecurity: No Food Insecurity (8/11/2024)    Hunger Vital Sign     Worried About Running Out of Food in the Last Year: Never true     Ran Out of Food in the Last Year: Never true   Transportation Needs: No Transportation Needs (8/11/2024)    PRAPARE - Transportation     Lack of Transportation (Medical): No     Lack of Transportation (Non-Medical): No   Physical Activity: Not on file   Stress: Not on file   Social Connections: Not on file   Intimate Partner Violence: Not on file   Housing Stability: Low Risk  (8/11/2024)    Housing Stability Vital Sign     Unable to Pay for Housing in the Last Year: No     Number of Times Moved in the Last Year: 2     Homeless in the Last Year: No       ROS:   Review of Systems   Constitutional:  Positive for activity change, appetite change and fatigue.   Respiratory:  Positive for cough and shortness of breath.    Gastrointestinal:  Positive for abdominal pain, constipation, nausea and vomiting.   Neurological:  Positive for dizziness, weakness and light-headedness.        FALLS X 2 PTA   All other systems reviewed and are negative.      Physical Exam:  Vitals:     08/12/24 0415   BP: 112/74   Pulse: 94   Resp:    Temp: 97.8 °F (36.6 °C)   SpO2: 97%       PSYCH: mood and affect normal, chilling/rigors, alert and oriented x 3: No apparent distress, comfortable  EYES: Sclera white, pupils equal round and reactive to light  ENMT:  Hearing normal, trachea midline, ears externally intact  LYMPH: no obvious lympadenopathy in neck.   RESP: Respiratory effort was normal with no retractions or use of accessory muscles.  CV:  No pedal edema  GI/ Abdomen: Soft, nondistended, nontender, no guarding, no peritoneal signs  MSK: no clubbing/ no cyanosis/ gaitnormal    Assessment & Plan 54 y.o. male who presents with alcohol withdrawal who is found to have acute alcoholic interstitial pancreatitis with a severe fatty liver   - CIWA per PCP  - LR 75 ml/hr  - Pb  - MRI/MRCP  - Thorazine for hiccups  - Analgesia  - Antiemetics  - Trend labs  - will follow     Time/Communication  Greater than 50% of time spent, total 55 minutes in counseling and coordination of care at the bedside regarding goals of care and diagnosis and prognosis.    Thank you for the consultation allowing me to take part in Ish Wesley's care.     Case discussed with and images reviewed with Dr Mtz    NOTE: This report was transcribed using voice recognition software. Every effort was made to ensure accuracy; however, inadvertent computerized transcription errors may be present.    CAROLYN STRAUSS-ACNS-BC, FNP-BC  8/12/2024  6:53 AM

## 2024-08-12 NOTE — PROGRESS NOTES
examination.  There are no gross findings of acute pancreatitis of the body   of the pancreas.   4. .         CT ABDOMEN PELVIS W IV CONTRAST Additional Contrast? None   Final Result   1. Findings consistent with interstitial pancreatitis.   2. Severe fatty liver.   3. Mild nonspecific distal esophageal wall thickening.   4. Mildly enlarged prostate.         XR CHEST PORTABLE   Final Result   No acute process.             Assessment and Plan:  Principal Problem:    Alcohol-induced acute pancreatitis without infection or necrosis  Active Problems:    DONNA (acute kidney injury) (HCC)    Severe alcohol use disorder (HCC)    Alcohol withdrawal syndrome without complication (HCC)    Hypokalemia    Alcoholic ketoacidosis    Alcoholic fatty liver    Intractable hiccups    Type 2 diabetes mellitus with hyperglycemia, without long-term current use of insulin (HCC)  Resolved Problems:    * No resolved hospital problems. *      Acute alcohol induced pancreatitis  -Continue IV fluid hydration, decrease to 75 mL/h today  -As needed analgesics  -Advance to low-fat full liquid diet today    Alcohol withdrawal syndrome  Severe alcohol use disorder  Alcoholic fatty liver disease  Alcoholic ketoacidosis  -IV phenobarbital taper  -Peer recovery consult  -Seizure precautions  -IV fluid hydration for ketoacidosis    Acute kidney injury  -Resolving  -IV fluids as above    Intractable hiccups  -Thorazine 25 mg p.o. 3 times daily for 2 days    Hypokalemia  -Potassium replacement as needed    Type 2 diabetes mellitus with hyperglycemia  -New diagnosis.  Will request diabetes educator consult  -Corrective scale insulin for now    DVT prophylaxis: Enoxaparin  CODE STATUS: Full    Disposition: Home in 2 to 3 days, unless patient is open to going directly to substance abuse treatment    NOTE: This report was transcribed using voice recognition software. Every effort was made to ensure accuracy; however, inadvertent computerized transcription  errors may be present.     Electronically signed by Edwin Wright DO on 8/12/2024 at 4:03 PM

## 2024-08-13 PROBLEM — K85.20 ALCOHOL-INDUCED ACUTE PANCREATITIS: Status: ACTIVE | Noted: 2024-08-13

## 2024-08-13 PROBLEM — K86.1 INTERSTITIAL PANCREATITIS (HCC): Status: ACTIVE | Noted: 2024-08-13

## 2024-08-13 LAB
ANION GAP SERPL CALCULATED.3IONS-SCNC: 12 MMOL/L (ref 7–16)
BUN SERPL-MCNC: 17 MG/DL (ref 6–20)
CALCIUM SERPL-MCNC: 7.7 MG/DL (ref 8.6–10.2)
CHLORIDE SERPL-SCNC: 91 MMOL/L (ref 98–107)
CO2 SERPL-SCNC: 27 MMOL/L (ref 22–29)
CREAT SERPL-MCNC: 0.8 MG/DL (ref 0.7–1.2)
GFR, ESTIMATED: >90 ML/MIN/1.73M2
GLUCOSE BLD-MCNC: 169 MG/DL (ref 74–99)
GLUCOSE BLD-MCNC: 197 MG/DL (ref 74–99)
GLUCOSE BLD-MCNC: 215 MG/DL (ref 74–99)
GLUCOSE BLD-MCNC: 242 MG/DL (ref 74–99)
GLUCOSE SERPL-MCNC: 172 MG/DL (ref 74–99)
MAGNESIUM SERPL-MCNC: 1.7 MG/DL (ref 1.6–2.6)
PHOSPHATE SERPL-MCNC: 0.8 MG/DL (ref 2.5–4.5)
POTASSIUM SERPL-SCNC: 3.2 MMOL/L (ref 3.5–5)
SODIUM SERPL-SCNC: 130 MMOL/L (ref 132–146)

## 2024-08-13 PROCEDURE — 2060000000 HC ICU INTERMEDIATE R&B

## 2024-08-13 PROCEDURE — 6370000000 HC RX 637 (ALT 250 FOR IP): Performed by: INTERNAL MEDICINE

## 2024-08-13 PROCEDURE — 2580000003 HC RX 258: Performed by: INTERNAL MEDICINE

## 2024-08-13 PROCEDURE — 80048 BASIC METABOLIC PNL TOTAL CA: CPT

## 2024-08-13 PROCEDURE — 97161 PT EVAL LOW COMPLEX 20 MIN: CPT | Performed by: PHYSICAL THERAPIST

## 2024-08-13 PROCEDURE — 84100 ASSAY OF PHOSPHORUS: CPT

## 2024-08-13 PROCEDURE — 99233 SBSQ HOSP IP/OBS HIGH 50: CPT | Performed by: INTERNAL MEDICINE

## 2024-08-13 PROCEDURE — 82962 GLUCOSE BLOOD TEST: CPT

## 2024-08-13 PROCEDURE — 97116 GAIT TRAINING THERAPY: CPT | Performed by: PHYSICAL THERAPIST

## 2024-08-13 PROCEDURE — 97530 THERAPEUTIC ACTIVITIES: CPT | Performed by: PHYSICAL THERAPIST

## 2024-08-13 PROCEDURE — 99232 SBSQ HOSP IP/OBS MODERATE 35: CPT | Performed by: TRANSPLANT SURGERY

## 2024-08-13 PROCEDURE — APPSS45 APP SPLIT SHARED TIME 31-45 MINUTES: Performed by: CLINICAL NURSE SPECIALIST

## 2024-08-13 PROCEDURE — 36415 COLL VENOUS BLD VENIPUNCTURE: CPT

## 2024-08-13 PROCEDURE — 83735 ASSAY OF MAGNESIUM: CPT

## 2024-08-13 PROCEDURE — 6360000002 HC RX W HCPCS: Performed by: INTERNAL MEDICINE

## 2024-08-13 PROCEDURE — 2500000003 HC RX 250 WO HCPCS: Performed by: INTERNAL MEDICINE

## 2024-08-13 RX ORDER — SODIUM CHLORIDE 9 MG/ML
INJECTION, SOLUTION INTRAVENOUS CONTINUOUS
Status: DISCONTINUED | OUTPATIENT
Start: 2024-08-13 | End: 2024-08-15

## 2024-08-13 RX ORDER — 0.9 % SODIUM CHLORIDE 0.9 %
500 INTRAVENOUS SOLUTION INTRAVENOUS ONCE
Status: COMPLETED | OUTPATIENT
Start: 2024-08-13 | End: 2024-08-13

## 2024-08-13 RX ADMIN — ONDANSETRON 4 MG: 4 TABLET, ORALLY DISINTEGRATING ORAL at 08:14

## 2024-08-13 RX ADMIN — CHLORPROMAZINE HYDROCHLORIDE 25 MG: 25 TABLET, FILM COATED ORAL at 08:14

## 2024-08-13 RX ADMIN — DIBASIC SODIUM PHOSPHATE, MONOBASIC POTASSIUM PHOSPHATE AND MONOBASIC SODIUM PHOSPHATE 2 TABLET: 852; 155; 130 TABLET ORAL at 10:49

## 2024-08-13 RX ADMIN — ENOXAPARIN SODIUM 40 MG: 100 INJECTION SUBCUTANEOUS at 08:14

## 2024-08-13 RX ADMIN — PHENOBARBITAL SODIUM 32.5 MG: 65 INJECTION, SOLUTION INTRAMUSCULAR; INTRAVENOUS at 13:22

## 2024-08-13 RX ADMIN — THIAMINE HYDROCHLORIDE 100 MG: 100 INJECTION, SOLUTION INTRAMUSCULAR; INTRAVENOUS at 08:14

## 2024-08-13 RX ADMIN — SODIUM CHLORIDE: 9 INJECTION, SOLUTION INTRAVENOUS at 21:35

## 2024-08-13 RX ADMIN — SODIUM CHLORIDE 500 ML: 9 INJECTION, SOLUTION INTRAVENOUS at 19:29

## 2024-08-13 RX ADMIN — PHENOBARBITAL SODIUM 32.5 MG: 65 INJECTION, SOLUTION INTRAMUSCULAR; INTRAVENOUS at 21:37

## 2024-08-13 RX ADMIN — POTASSIUM CHLORIDE 40 MEQ: 1500 TABLET, EXTENDED RELEASE ORAL at 10:49

## 2024-08-13 RX ADMIN — PHENOBARBITAL SODIUM 32.5 MG: 65 INJECTION, SOLUTION INTRAMUSCULAR; INTRAVENOUS at 00:54

## 2024-08-13 RX ADMIN — SODIUM CHLORIDE, PRESERVATIVE FREE 10 ML: 5 INJECTION INTRAVENOUS at 21:38

## 2024-08-13 RX ADMIN — CHLORPROMAZINE HYDROCHLORIDE 25 MG: 25 TABLET, FILM COATED ORAL at 16:02

## 2024-08-13 RX ADMIN — SODIUM PHOSPHATE, MONOBASIC, MONOHYDRATE AND SODIUM PHOSPHATE, DIBASIC, ANHYDROUS 30 MMOL: 142; 276 INJECTION, SOLUTION INTRAVENOUS at 10:50

## 2024-08-13 RX ADMIN — DIBASIC SODIUM PHOSPHATE, MONOBASIC POTASSIUM PHOSPHATE AND MONOBASIC SODIUM PHOSPHATE 2 TABLET: 852; 155; 130 TABLET ORAL at 21:37

## 2024-08-13 RX ADMIN — DIBASIC SODIUM PHOSPHATE, MONOBASIC POTASSIUM PHOSPHATE AND MONOBASIC SODIUM PHOSPHATE 2 TABLET: 852; 155; 130 TABLET ORAL at 16:02

## 2024-08-13 RX ADMIN — INSULIN LISPRO 1 UNITS: 100 INJECTION, SOLUTION INTRAVENOUS; SUBCUTANEOUS at 13:22

## 2024-08-13 RX ADMIN — PHENOBARBITAL SODIUM 32.5 MG: 65 INJECTION, SOLUTION INTRAMUSCULAR; INTRAVENOUS at 06:04

## 2024-08-13 ASSESSMENT — PAIN DESCRIPTION - DESCRIPTORS: DESCRIPTORS: DISCOMFORT

## 2024-08-13 ASSESSMENT — PAIN DESCRIPTION - LOCATION: LOCATION: ABDOMEN;THROAT

## 2024-08-13 ASSESSMENT — PAIN SCALES - GENERAL: PAINLEVEL_OUTOF10: 5

## 2024-08-13 NOTE — PROGRESS NOTES
precautions  -IV fluid hydration for ketoacidosis to be discontinued today    Acute kidney injury  -Resolving  -IV fluids as above    Intractable hiccups  -Resolving  -Will complete Thorazine 25 mg p.o. 3 times daily    Hypokalemia  Hypophosphatemia  -Aggressive IV and p.o. replacement today.  Will give 30 mmol sodium phosphate IV, K-Phos Neutral 500 mg p.o. 3 times daily for 3 doses  -Repeat labs tomorrow    Type 2 diabetes mellitus with hyperglycemia  -He reports this is not a new diagnosis, and he has been diabetic for several years  -Diabetes educator input appreciated  -Corrective scale insulin for now.  Plan to resume metformin on discharge    DVT prophylaxis: Enoxaparin  CODE STATUS: Full    Disposition: Likely home tomorrow    NOTE: This report was transcribed using voice recognition software. Every effort was made to ensure accuracy; however, inadvertent computerized transcription errors may be present.     Electronically signed by Edwin Wright DO on 8/13/2024 at 8:49 AM

## 2024-08-13 NOTE — PROGRESS NOTES
Hepatobiliary and Pancreatic Surgery Progress Note    Chief Complaint:        Chief Complaint   Patient presents with    Alcohol Problem       Drank 4 glasses vodka daily x 2 months, last drink Wednesday 8/7       Subjective: Patient states he is doing much better today. Reports mild discomfort mid abdomen, aching, 2/10. Denies vomiting. States he has intermittent nausea. Denies BM, + flatus. Told liquids yesterday.    OBJECTIVE      Physical    BP (!) 99/58   Pulse 98   Temp 97.5 °F (36.4 °C) (Oral)   Resp 18   Ht 1.702 m (5' 7\")   Wt 85 kg (187 lb 6.3 oz)   SpO2 98%   BMI 29.35 kg/m²       General appearance: appears in no acute distress  Lungs:respiratory effort normal without accessory numbers  Heart: no pedal edema  Abdomen: soft, nondistended, nontympanic, mild tenderness mid abdomen without guarding or rebound, no peritoneal signs, normoactive bowel sounds  Extremities: ROM normal    ASSESSMENT/PLAN:  53 yo M with alcoholic pancreatitis. Has peripancreatic stranding and diffusely steatotic liver.   -OK for liquid diet.   -Continue IVF.   -CIWA per primary team.   - LR 75 ml/hr  - MRI/MRCP noted  - Thorazine for hiccups  - Analgesia  - Antiemetics  - Trend labs, Mg and Phos this am, keep mg  >2 and Phos >4  - will follow    Thank you Dr. Godwin for the consultation and allowing me to take part in Mr. Wesley's care.    Greater than or equal to 35 minutes was spent providing face-to-face patient care, including:  and coordinating care, reviewing the chart, labs, and diagnostics, as well as medical decision making. Greater than 50% of this time was spent instructing and counseling the patient face to face regarding findings and recommendations.    Case discussed with and plan per Dr Raul Martinez [id number], FNP-BC 8/13/2024 6:12 AM     Attending Physician Statement:    Chief Complaint:   Chief Complaint   Patient presents with    Alcohol Problem     Drank 4 glasses vodka daily x 2

## 2024-08-13 NOTE — CARE COORDINATION
Case Management Assessment  Initial Evaluation    Date/Time of Evaluation: 8/13/2024 1:13 PM  Assessment Completed by: Alma Espinosa RN    If patient is discharged prior to next notation, then this note serves as note for discharge by case management.    Patient Name: Jose Wesley                   YOB: 1970  Diagnosis: DONNA (acute kidney injury) (HCC) [N17.9]  Alcohol withdrawal syndrome without complication (HCC) [F10.930]  Acute renal failure, unspecified acute renal failure type (HCC) [N17.9]  Alcohol-induced acute pancreatitis, unspecified complication status [K85.20]                   Date / Time: 8/11/2024  7:57 AM    Patient Admission Status: Inpatient   Readmission Risk (Low < 19, Mod (19-27), High > 27): Readmission Risk Score: 9.4    Current PCP: No primary care provider on file.  PCP verified by CM? No (pt states he moved to the area in April from [place] and currently does not have a PCP. He states his sister is checking to see if her PCP is accepting new pts/takes his insurance. Provided pt oneal/ [facility] PCP list and contact information for [facility].)    Chart Reviewed: Yes      History Provided by: Patient  Patient Orientation: Alert and Oriented    Patient Cognition: Alert    Hospitalization in the last 30 days (Readmission):  No    If yes, Readmission Assessment in CM Navigator will be completed.    Advance Directives:      Code Status: Full Code   Patient's Primary Decision Maker is: Legal Next of Kin      Discharge Planning:    Patient lives with: Alone Type of Home: House  Primary Care Giver: Self  Patient Support Systems include: Family Members   Current Financial resources:    Current community resources:    Current services prior to admission: None            Current DME:              Type of Home Care services:  None    ADLS  Prior functional level: Independent in ADLs/IADLs  Current functional level: Independent in ADLs/IADLs    PT AM-PAC: 15 /24  OT AM-PAC:   /24    Family

## 2024-08-13 NOTE — PROGRESS NOTES
Reason for consult:  New Dx Type 2 DM    A1C:    6.7% - 8/12/24         Patient states the following concerns/barriers to diabetes self-management:     [] None       [] Medication cost   [] Food cost/availability        [] Reading  [] Hearing   [] Vision                [] Work    [] Transportation  [x] No insurance - for 1.5 years- recently got coverage again.  [] Physical limitations    [] Other:        Patient states the following about their diabetes/health:   [x]   Not new dx.  Pt has had Type 2 DM for a few years. Family hx.   [x]   Typically eats one meal per day.  But was on an alcohol binge for the past 2 months and had not eaten anything since the 1st week of August.  Was drinking 3 L of liquor per week.   [x]   Does not have a glucometer currently- has used one in the past.  Would like a new one. Supposed to check blood glucose once daily.  [x]  Has not taken any diabetes medications in the past 2 months.   [x]   Little to no physical activity    Diabetes survival packet provided to:   [x] Patient     [] Other:    Information given:   Definition of diabetes   Target glucose ranges/A1C   Self-monitoring of blood glucose   Prevention/symptoms/treatment of hypo-/hyperglycemia   Medication adherence             The plate method/meal planning guidelines             The benefits of exercise and recommendations             Reducing the risk of chronic complications   Blood glucose log     Diabetes medications reviewed (use, purpose, action): Metformin            Post-education Assessment  [x]  Attentive to teaching  [x]  Answered questions appropriately when asked   []  Seems able to apply concepts to daily lifestyle  []  Seems motivated to do well  [x]  Verbalized an understanding of plate method  [x]  Verbalized an understanding of prescribed antidiabetes medications   [x]  Verbalized an understanding of target glucose ranges/A1C level  [x]  Expresses an intent to comply with treatment plan   []  Showed very  little interest in complying with treatment plan   [x]  Seems to have trouble applying concepts to daily lifestyle     COMMENTS: Pt is struggling with alcohol abuse issues interfering with his ability to manage his diabetes. Social work to meet with pt to discuss rehab.  Pt went without insurance for 1.5 years and recently obtained coverage.  Pt is agreeable to checking blood glucose daily again and needs a new glucometer.  Reviewed the importance of consistent carbohydrate intake in managing blood glucose.  Explained services provided by Diabetes Education department and provided contact information.  Pt needs to establish with new PCP.           Recommendations:   [x] Carbohydrate-controlled diet    [x] Script for glucometer and supplies (per preference of patient's insurance)  [] Script for insulin pens and pen needles (if insulin is ordered at discharge for home use)   [] Consult to social work: patient has no insurance or has financial hardship  [] Inpatient consult to endocrinologist   [] Follow up with endocrinologist as an outpatient   [] Home healthcare nursing to increase compliance to treatment plan   [] Script for outpatient diabetes education classes (from doctor)        Thank you for this consult.   Time spent with pt: 20 minutes  Bren Hoff RN, BSN 8/13/24 10:36AM

## 2024-08-13 NOTE — PLAN OF CARE
Problem: Discharge Planning  Goal: Discharge to home or other facility with appropriate resources  Outcome: Progressing     Problem: Pain  Goal: Verbalizes/displays adequate comfort level or baseline comfort level  Outcome: Progressing     Problem: Skin/Tissue Integrity - Adult  Goal: Skin integrity remains intact  Outcome: Progressing  Flowsheets (Taken 8/12/2024 2010)  Skin Integrity Remains Intact: Monitor for areas of redness and/or skin breakdown     Problem: Musculoskeletal - Adult  Goal: Return mobility to safest level of function  Outcome: Progressing     Problem: Gastrointestinal - Adult  Goal: Minimal or absence of nausea and vomiting  Outcome: Progressing     Problem: Metabolic/Fluid and Electrolytes - Adult  Goal: Electrolytes maintained within normal limits  Outcome: Progressing     Problem: Neurosensory - Adult  Goal: Absence of seizures  Outcome: Progressing  Goal: Achieves maximal functionality and self care  Outcome: Progressing  Goal: Achieves stable or improved neurological status  Outcome: Progressing

## 2024-08-13 NOTE — PROGRESS NOTES
Physical Therapy Initial Evaluation/Plan of Care    Room #:  0520/0520-01  Patient Name: Jose Wesley  YOB: 1970  MRN: [id number]    Date of Service: 8/13/2024     Tentative placement recommendation: Subacute unless patient meets goals then Home Health Physical Therapy  Equipment recommendation: To be determined      Evaluating Physical Therapist: Phuong Ariza, PT #[id number]      Specific Provider Orders/Date/Referring Provider :  08/13/24 0830    PT eval and treat  Start:  08/13/24 0830,   End:  08/13/24 0830,   ONE TIME,   Standing Count:  1 Occurrences,   R       Edwin Wright DO    Admitting Diagnosis:   DONNA (acute kidney injury) (HCC) [N17.9]  Alcohol withdrawal syndrome without complication (HCC) [F10.930]  Acute renal failure, unspecified acute renal failure type (HCC) [N17.9]  Alcohol-induced acute pancreatitis, unspecified complication status [K85.20]      states he stopped drinking alcohol 4 days ago.  He states since stopping drinking he has had some hiccups and some coughing and get some phlegm up, has also had a little bit of abdominal aching and nausea with vomiting up phlegm.   Surgery: none      Patient Active Problem List   Diagnosis    Acute renal failure (HCC)    Alcohol-induced acute pancreatitis without infection or necrosis    Severe alcohol use disorder (HCC)    Alcohol withdrawal syndrome without complication (HCC)    Hypokalemia    Alcoholic ketoacidosis    Alcoholic fatty liver    Intractable hiccups    Type 2 diabetes mellitus with hyperglycemia, without long-term current use of insulin (HCC)    Alcohol-induced acute pancreatitis    Interstitial pancreatitis (HCC)        ASSESSMENT of Current Deficits Patient exhibits decreased strength, balance, and endurance impairing functional mobility, transfers, gait , gait distance, and tolerance to activity unsteady gait, cues for walker negotiation, approximation and safety. Patient reported lightheadedness, becoming less

## 2024-08-14 PROBLEM — I95.1 ORTHOSTATIC HYPOTENSION: Status: ACTIVE | Noted: 2024-08-14

## 2024-08-14 PROBLEM — E87.1 HYPONATREMIA: Status: ACTIVE | Noted: 2024-08-14

## 2024-08-14 LAB
ALBUMIN SERPL-MCNC: 3.2 G/DL (ref 3.5–5.2)
ALP SERPL-CCNC: 77 U/L (ref 40–129)
ALT SERPL-CCNC: 21 U/L (ref 0–40)
ANION GAP SERPL CALCULATED.3IONS-SCNC: 16 MMOL/L (ref 7–16)
AST SERPL-CCNC: 31 U/L (ref 0–39)
BILIRUB SERPL-MCNC: 0.7 MG/DL (ref 0–1.2)
BUN SERPL-MCNC: 10 MG/DL (ref 6–20)
CALCIUM SERPL-MCNC: 7.2 MG/DL (ref 8.6–10.2)
CHLORIDE SERPL-SCNC: 92 MMOL/L (ref 98–107)
CO2 SERPL-SCNC: 24 MMOL/L (ref 22–29)
CREAT SERPL-MCNC: 0.8 MG/DL (ref 0.7–1.2)
GFR, ESTIMATED: >90 ML/MIN/1.73M2
GLUCOSE BLD-MCNC: 138 MG/DL (ref 74–99)
GLUCOSE BLD-MCNC: 178 MG/DL (ref 74–99)
GLUCOSE BLD-MCNC: 207 MG/DL (ref 74–99)
GLUCOSE BLD-MCNC: 231 MG/DL (ref 74–99)
GLUCOSE SERPL-MCNC: 165 MG/DL (ref 74–99)
MAGNESIUM SERPL-MCNC: 1.2 MG/DL (ref 1.6–2.6)
PHOSPHATE SERPL-MCNC: 2.3 MG/DL (ref 2.5–4.5)
POTASSIUM SERPL-SCNC: 3.2 MMOL/L (ref 3.5–5)
PROT SERPL-MCNC: 5.7 G/DL (ref 6.4–8.3)
SODIUM SERPL-SCNC: 132 MMOL/L (ref 132–146)

## 2024-08-14 PROCEDURE — 2060000000 HC ICU INTERMEDIATE R&B

## 2024-08-14 PROCEDURE — 6370000000 HC RX 637 (ALT 250 FOR IP): Performed by: INTERNAL MEDICINE

## 2024-08-14 PROCEDURE — APPSS45 APP SPLIT SHARED TIME 31-45 MINUTES: Performed by: CLINICAL NURSE SPECIALIST

## 2024-08-14 PROCEDURE — 83735 ASSAY OF MAGNESIUM: CPT

## 2024-08-14 PROCEDURE — 84100 ASSAY OF PHOSPHORUS: CPT

## 2024-08-14 PROCEDURE — 82962 GLUCOSE BLOOD TEST: CPT

## 2024-08-14 PROCEDURE — 97530 THERAPEUTIC ACTIVITIES: CPT

## 2024-08-14 PROCEDURE — 36415 COLL VENOUS BLD VENIPUNCTURE: CPT

## 2024-08-14 PROCEDURE — 2580000003 HC RX 258: Performed by: INTERNAL MEDICINE

## 2024-08-14 PROCEDURE — 2500000003 HC RX 250 WO HCPCS: Performed by: CLINICAL NURSE SPECIALIST

## 2024-08-14 PROCEDURE — 2580000003 HC RX 258: Performed by: CLINICAL NURSE SPECIALIST

## 2024-08-14 PROCEDURE — 80053 COMPREHEN METABOLIC PANEL: CPT

## 2024-08-14 PROCEDURE — 6370000000 HC RX 637 (ALT 250 FOR IP): Performed by: CLINICAL NURSE SPECIALIST

## 2024-08-14 PROCEDURE — 97110 THERAPEUTIC EXERCISES: CPT

## 2024-08-14 PROCEDURE — 6360000002 HC RX W HCPCS: Performed by: INTERNAL MEDICINE

## 2024-08-14 PROCEDURE — 99232 SBSQ HOSP IP/OBS MODERATE 35: CPT | Performed by: INTERNAL MEDICINE

## 2024-08-14 PROCEDURE — 2500000003 HC RX 250 WO HCPCS: Performed by: INTERNAL MEDICINE

## 2024-08-14 RX ORDER — SENNOSIDES A AND B 8.6 MG/1
2 TABLET, FILM COATED ORAL NIGHTLY
Status: DISCONTINUED | OUTPATIENT
Start: 2024-08-14 | End: 2024-08-15 | Stop reason: HOSPADM

## 2024-08-14 RX ORDER — PHENOBARBITAL 32.4 MG/1
16.2 TABLET ORAL ONCE
Status: COMPLETED | OUTPATIENT
Start: 2024-08-14 | End: 2024-08-14

## 2024-08-14 RX ORDER — BISACODYL 10 MG
10 SUPPOSITORY, RECTAL RECTAL ONCE
Status: COMPLETED | OUTPATIENT
Start: 2024-08-14 | End: 2024-08-14

## 2024-08-14 RX ORDER — CALCIUM GLUCONATE 20 MG/ML
2000 INJECTION, SOLUTION INTRAVENOUS ONCE
Status: COMPLETED | OUTPATIENT
Start: 2024-08-14 | End: 2024-08-14

## 2024-08-14 RX ORDER — MAGNESIUM SULFATE 1 G/100ML
1000 INJECTION INTRAVENOUS ONCE
Status: DISCONTINUED | OUTPATIENT
Start: 2024-08-14 | End: 2024-08-14

## 2024-08-14 RX ORDER — PHENOBARBITAL 32.4 MG/1
16.2 TABLET ORAL ONCE
Status: DISCONTINUED | OUTPATIENT
Start: 2024-08-14 | End: 2024-08-14

## 2024-08-14 RX ORDER — MIDODRINE HYDROCHLORIDE 2.5 MG/1
2.5 TABLET ORAL
Status: DISCONTINUED | OUTPATIENT
Start: 2024-08-14 | End: 2024-08-15 | Stop reason: HOSPADM

## 2024-08-14 RX ADMIN — POTASSIUM PHOSPHATE 30 MMOL: 236; 224 INJECTION, SOLUTION INTRAVENOUS at 11:36

## 2024-08-14 RX ADMIN — POTASSIUM CHLORIDE 40 MEQ: 1500 TABLET, EXTENDED RELEASE ORAL at 11:41

## 2024-08-14 RX ADMIN — POTASSIUM PHOSPHATE 30 MMOL: 236; 224 INJECTION, SOLUTION INTRAVENOUS at 21:44

## 2024-08-14 RX ADMIN — SENNOSIDES 17.2 MG: 8.6 TABLET, FILM COATED ORAL at 21:36

## 2024-08-14 RX ADMIN — MORPHINE SULFATE 2 MG: 2 INJECTION, SOLUTION INTRAMUSCULAR; INTRAVENOUS at 07:56

## 2024-08-14 RX ADMIN — PHENOBARBITAL SODIUM 32.5 MG: 65 INJECTION, SOLUTION INTRAMUSCULAR; INTRAVENOUS at 08:11

## 2024-08-14 RX ADMIN — MAGNESIUM SULFATE HEPTAHYDRATE 6000 MG: 500 INJECTION, SOLUTION INTRAMUSCULAR; INTRAVENOUS at 12:44

## 2024-08-14 RX ADMIN — SODIUM CHLORIDE, PRESERVATIVE FREE 10 ML: 5 INJECTION INTRAVENOUS at 08:12

## 2024-08-14 RX ADMIN — CALCIUM GLUCONATE 2000 MG: 20 INJECTION, SOLUTION INTRAVENOUS at 14:40

## 2024-08-14 RX ADMIN — DIBASIC SODIUM PHOSPHATE, MONOBASIC POTASSIUM PHOSPHATE AND MONOBASIC SODIUM PHOSPHATE 2 TABLET: 852; 155; 130 TABLET ORAL at 21:36

## 2024-08-14 RX ADMIN — INSULIN LISPRO 1 UNITS: 100 INJECTION, SOLUTION INTRAVENOUS; SUBCUTANEOUS at 11:40

## 2024-08-14 RX ADMIN — PHENOBARBITAL 16.2 MG: 32.4 TABLET ORAL at 14:48

## 2024-08-14 RX ADMIN — ENOXAPARIN SODIUM 40 MG: 100 INJECTION SUBCUTANEOUS at 08:10

## 2024-08-14 RX ADMIN — INSULIN LISPRO 1 UNITS: 100 INJECTION, SOLUTION INTRAVENOUS; SUBCUTANEOUS at 16:34

## 2024-08-14 RX ADMIN — SODIUM CHLORIDE: 9 INJECTION, SOLUTION INTRAVENOUS at 03:55

## 2024-08-14 RX ADMIN — THIAMINE HYDROCHLORIDE 100 MG: 100 INJECTION, SOLUTION INTRAMUSCULAR; INTRAVENOUS at 08:10

## 2024-08-14 RX ADMIN — DIBASIC SODIUM PHOSPHATE, MONOBASIC POTASSIUM PHOSPHATE AND MONOBASIC SODIUM PHOSPHATE 2 TABLET: 852; 155; 130 TABLET ORAL at 14:40

## 2024-08-14 RX ADMIN — BISACODYL 10 MG: 10 SUPPOSITORY RECTAL at 11:51

## 2024-08-14 RX ADMIN — SODIUM CHLORIDE, PRESERVATIVE FREE 10 ML: 5 INJECTION INTRAVENOUS at 21:38

## 2024-08-14 RX ADMIN — MIDODRINE HYDROCHLORIDE 2.5 MG: 2.5 TABLET ORAL at 16:33

## 2024-08-14 RX ADMIN — ONDANSETRON 4 MG: 4 TABLET, ORALLY DISINTEGRATING ORAL at 04:58

## 2024-08-14 ASSESSMENT — PAIN SCALES - GENERAL
PAINLEVEL_OUTOF10: 6
PAINLEVEL_OUTOF10: 0
PAINLEVEL_OUTOF10: 0

## 2024-08-14 ASSESSMENT — PAIN DESCRIPTION - LOCATION: LOCATION: ABDOMEN

## 2024-08-14 NOTE — PROGRESS NOTES
Physical Therapy Treatment Note/Plan of Care    Room #:  0520/0520-01  Patient Name: Jose Wesley  YOB: 1970  MRN: [id number]    Date of Service: 8/14/2024     Tentative placement recommendation: Subacute unless patient meets goals then Home Health Physical Therapy  Equipment recommendation: To be determined      Evaluating Physical Therapist: Phuong Ariza, PT #[id number]      Specific Provider Orders/Date/Referring Provider :  08/13/24 0830    PT eval and treat  Start:  08/13/24 0830,   End:  08/13/24 0830,   ONE TIME,   Standing Count:  1 Occurrences,   R       Edwin Wright DO    Admitting Diagnosis:   DONNA (acute kidney injury) (HCC) [N17.9]  Alcohol withdrawal syndrome without complication (HCC) [F10.930]  Acute renal failure, unspecified acute renal failure type (HCC) [N17.9]  Alcohol-induced acute pancreatitis, unspecified complication status [K85.20]      states he stopped drinking alcohol 4 days ago.  He states since stopping drinking he has had some hiccups and some coughing and get some phlegm up, has also had a little bit of abdominal aching and nausea with vomiting up phlegm.   Surgery: none      Patient Active Problem List   Diagnosis    Acute renal failure (HCC)    Alcohol-induced acute pancreatitis without infection or necrosis    Severe alcohol use disorder (HCC)    Alcohol withdrawal syndrome without complication (HCC)    Hypokalemia    Alcoholic ketoacidosis    Alcoholic fatty liver    Intractable hiccups    Type 2 diabetes mellitus with hyperglycemia, without long-term current use of insulin (HCC)    Alcohol-induced acute pancreatitis    Interstitial pancreatitis (HCC)    Hyponatremia        ASSESSMENT of Current Deficits Patient exhibits decreased strength, balance, and endurance impairing functional mobility, transfers, gait , gait distance, and tolerance to activity. Patient unsteady at times during ambulation but no loss of balance, dizziness, or feeling light-headed. Pt's

## 2024-08-14 NOTE — PROGRESS NOTES
Hepatobiliary and Pancreatic Surgery Progress Note    Chief Complaint:        Chief Complaint   Patient presents with    Alcohol Problem       Drank 4 glasses vodka daily x 2 months, last drink Wednesday 8/7       Subjective: States he woke with some nausea followed by some epigastric pain radiating to the mid thoracic back rated 6-7/10 however it is down to 3/10 and his nausea has resolved.  He has not had a bowel movement in about 10 days.  States he is passing flatus.  Abdomen is distended.  Denies vomiting.  States pain is not worse after eating. No DTs.    OBJECTIVE      Physical    BP (!) 142/79   Pulse 94   Temp 98.8 °F (37.1 °C) (Oral)   Resp 21   Ht 1.702 m (5' 7\")   Wt 85 kg (187 lb 6.3 oz)   SpO2 97%   BMI 29.35 kg/m²       General appearance: appears in no acute distress  Lungs:respiratory effort normal without accessory numbers  Heart: no pedal edema  Abdomen: soft, distended, nontympanic, no tenderness to palpation, no peritoneal signs, normoactive bowel sounds  Extremities: ROM normal    ASSESSMENT/PLAN:  53 yo M with alcoholic induced interstitial pancreatitis. Has peripancreatic stranding and diffusely steatotic liver.   - Continue IVF.   - CIWA per primary team.   - Thorazine for hiccups  - Analgesia  - Antiemetics  - Trend labs, Mg and Phos this am, keep mg  >2 and Phos >4  - Dulcolax RS x 1 today, if no results tapwater enema - no BM x 10 days  - Bowel regimen  - Patient states that he is feeling better.  - He has minimal abdominal pain and tolerated low fat diet    Thank you Dr. Godwin for the consultation and allowing me to take part in Mr. Wesley's care.    Greater than or equal to 35 minutes was spent providing face-to-face patient care, including:  and coordinating care, reviewing the chart, labs, and diagnostics, as well as medical decision making. Greater than 50% of this time was spent instructing and counseling the patient face to face regarding findings and  recommendations.    Case discussed with and plan per Dr Raul Martinez [id number], FNP-BC 8/14/2024 8:31 AM

## 2024-08-14 NOTE — PROGRESS NOTES
97.0   MCH 34.4   MCHC 35.5*   RDW 12.2      MPV 10.2     Radiology:   MRI ABDOMEN W WO CONTRAST MRCP   Final Result   1. Acute interstitial edematous pancreatitis.   2. No evidence of abscess, necrosis or pancreatic tail atrophy.   3. No evidence of cholelithiasis or choledocholithiasis.   4. Moderate hepatic steatosis.   5. Trace bilateral pleural effusions.         CTA CHEST W CONTRAST   Final Result   1. There is no pulmonary embolus   2. Advanced hepatic steatosis   3. Only the superior aspect of the body of the pancreas is included on the   examination.  There are no gross findings of acute pancreatitis of the body   of the pancreas.   4. .         CT ABDOMEN PELVIS W IV CONTRAST Additional Contrast? None   Final Result   1. Findings consistent with interstitial pancreatitis.   2. Severe fatty liver.   3. Mild nonspecific distal esophageal wall thickening.   4. Mildly enlarged prostate.         XR CHEST PORTABLE   Final Result   No acute process.             Assessment and Plan:  Principal Problem:    Alcohol-induced acute pancreatitis without infection or necrosis  Active Problems:    Acute renal failure (HCC)    Severe alcohol use disorder (HCC)    Alcohol withdrawal syndrome without complication (HCC)    Hypokalemia    Alcoholic ketoacidosis    Alcoholic fatty liver    Intractable hiccups    Type 2 diabetes mellitus with hyperglycemia, without long-term current use of insulin (HCC)    Alcohol-induced acute pancreatitis    Interstitial pancreatitis (HCC)    Hyponatremia    Orthostatic hypotension  Resolved Problems:    * No resolved hospital problems. *      Acute alcohol induced pancreatitis  -IV fluids as noted below  -Continue low-fat diet  -As needed analgesics  -MRI without evidence of pancreatic divisum, necrosis, abscess, etc.    Alcohol withdrawal syndrome  Severe alcohol use disorder  Alcoholic fatty liver disease  Alcoholic ketoacidosis  -Will continue IV phenobarbital for this afternoon,  followed by one-time dose of oral phenobarbital then discontinue and monitor  -Peer recovery consult  -Seizure precautions    Acute kidney injury  -Resolving  -IV fluids as above    Intractable hiccups  -Resolved after course of Thorazine    Hypokalemia  Hypophosphatemia  -Aggressive IV and p.o. replacement again today, including K-Phos Neutral p.o., magnesium sulfate IV, potassium chloride p.o.  -Repeat labs tomorrow    Type 2 diabetes mellitus with hyperglycemia  -He reports this is not a new diagnosis, and he has been diabetic for several years  -Diabetes educator input appreciated  -Corrective scale insulin for now.  Plan to resume metformin on discharge    Orthostatic hypotension  -Will continue hydration with IV normal saline  -Add low-dose midodrine 2.5 mg p.o. 3 times daily for now  -Repeat orthostatic vitals tomorrow    DVT prophylaxis: Enoxaparin  CODE STATUS: Full    Disposition: Anticipate home in the next 24 to 48 hours once electrolyte dyscrasias corrected    NOTE: This report was transcribed using voice recognition software. Every effort was made to ensure accuracy; however, inadvertent computerized transcription errors may be present.     Electronically signed by Edwin Wright DO on 8/14/2024 at 4:30 PM

## 2024-08-14 NOTE — PLAN OF CARE
Problem: Discharge Planning  Goal: Discharge to home or other facility with appropriate resources  Outcome: Progressing     Problem: Pain  Goal: Verbalizes/displays adequate comfort level or baseline comfort level  Recent Flowsheet Documentation  Taken 8/13/2024 1958 by Bette Dykes RN  Verbalizes/displays adequate comfort level or baseline comfort level: Encourage patient to monitor pain and request assistance     Problem: Skin/Tissue Integrity - Adult  Goal: Skin integrity remains intact  Outcome: Progressing     Problem: Musculoskeletal - Adult  Goal: Return mobility to safest level of function  Outcome: Progressing     Problem: Metabolic/Fluid and Electrolytes - Adult  Goal: Electrolytes maintained within normal limits  Outcome: Progressing     Problem: Safety - Adult  Goal: Free from fall injury  Outcome: Progressing     Problem: Skin/Tissue Integrity  Goal: Absence of new skin breakdown  Description: 1.  Monitor for areas of redness and/or skin breakdown  2.  Assess vascular access sites hourly  3.  Every 4-6 hours minimum:  Change oxygen saturation probe site  4.  Every 4-6 hours:  If on nasal continuous positive airway pressure, respiratory therapy assess nares and determine need for appliance change or resting period.  Outcome: Progressing

## 2024-08-15 VITALS
HEIGHT: 67 IN | WEIGHT: 187.39 LBS | RESPIRATION RATE: 17 BRPM | OXYGEN SATURATION: 96 % | SYSTOLIC BLOOD PRESSURE: 158 MMHG | DIASTOLIC BLOOD PRESSURE: 95 MMHG | BODY MASS INDEX: 29.41 KG/M2 | HEART RATE: 99 BPM | TEMPERATURE: 98.6 F

## 2024-08-15 LAB
ANION GAP SERPL CALCULATED.3IONS-SCNC: 14 MMOL/L (ref 7–16)
BUN SERPL-MCNC: 7 MG/DL (ref 6–20)
CALCIUM SERPL-MCNC: 7.4 MG/DL (ref 8.6–10.2)
CHLORIDE SERPL-SCNC: 95 MMOL/L (ref 98–107)
CO2 SERPL-SCNC: 25 MMOL/L (ref 22–29)
CREAT SERPL-MCNC: 0.8 MG/DL (ref 0.7–1.2)
GFR, ESTIMATED: >90 ML/MIN/1.73M2
GLUCOSE BLD-MCNC: 146 MG/DL (ref 74–99)
GLUCOSE SERPL-MCNC: 149 MG/DL (ref 74–99)
MAGNESIUM SERPL-MCNC: 2.4 MG/DL (ref 1.6–2.6)
PHOSPHATE SERPL-MCNC: 4.6 MG/DL (ref 2.5–4.5)
POTASSIUM SERPL-SCNC: 4.2 MMOL/L (ref 3.5–5)
SODIUM SERPL-SCNC: 134 MMOL/L (ref 132–146)

## 2024-08-15 PROCEDURE — 2580000003 HC RX 258: Performed by: INTERNAL MEDICINE

## 2024-08-15 PROCEDURE — 6360000002 HC RX W HCPCS: Performed by: INTERNAL MEDICINE

## 2024-08-15 PROCEDURE — 6370000000 HC RX 637 (ALT 250 FOR IP): Performed by: INTERNAL MEDICINE

## 2024-08-15 PROCEDURE — 36415 COLL VENOUS BLD VENIPUNCTURE: CPT

## 2024-08-15 PROCEDURE — 80048 BASIC METABOLIC PNL TOTAL CA: CPT

## 2024-08-15 PROCEDURE — 84100 ASSAY OF PHOSPHORUS: CPT

## 2024-08-15 PROCEDURE — 82962 GLUCOSE BLOOD TEST: CPT

## 2024-08-15 PROCEDURE — 99239 HOSP IP/OBS DSCHRG MGMT >30: CPT | Performed by: INTERNAL MEDICINE

## 2024-08-15 PROCEDURE — 83735 ASSAY OF MAGNESIUM: CPT

## 2024-08-15 PROCEDURE — 99232 SBSQ HOSP IP/OBS MODERATE 35: CPT | Performed by: TRANSPLANT SURGERY

## 2024-08-15 RX ORDER — MIDODRINE HYDROCHLORIDE 2.5 MG/1
2.5 TABLET ORAL
Qty: 90 TABLET | Refills: 0 | Status: SHIPPED | OUTPATIENT
Start: 2024-08-15

## 2024-08-15 RX ADMIN — ACETAMINOPHEN 650 MG: 325 TABLET ORAL at 00:25

## 2024-08-15 RX ADMIN — SODIUM CHLORIDE: 9 INJECTION, SOLUTION INTRAVENOUS at 00:23

## 2024-08-15 RX ADMIN — THIAMINE HYDROCHLORIDE 100 MG: 100 INJECTION, SOLUTION INTRAMUSCULAR; INTRAVENOUS at 09:11

## 2024-08-15 RX ADMIN — ENOXAPARIN SODIUM 40 MG: 100 INJECTION SUBCUTANEOUS at 09:11

## 2024-08-15 RX ADMIN — MIDODRINE HYDROCHLORIDE 2.5 MG: 2.5 TABLET ORAL at 09:11

## 2024-08-15 RX ADMIN — SODIUM CHLORIDE, PRESERVATIVE FREE 10 ML: 5 INJECTION INTRAVENOUS at 09:12

## 2024-08-15 NOTE — DISCHARGE INSTRUCTIONS
Your information:  Name: Jose Wesley  : 1970    Your instructions:    YOU ARE BEING DISCHARGED HOME. PLEASE MAKE AND KEEP ALL FOLLOW-UP APPOINTMENTS. IF YOU EXPERIENCE CHEST PAIN, SHORTNESS OF BREATH, SWEATING, LIGHTHEADEDNESS, OR PALPITATIONS: CALL 911 OR GO TO THE EMERGENCY DEPARTMENT IMMEDIATELY. If you begin to feel unwell or symptoms persist, contact your physician.    What to do after you leave the hospital:    Recommended diet: regular diet    Recommended activity: activity as tolerated        The following personal items were collected during your admission and were returned to you:    Belongings  Dental Appliances: None  Vision - Corrective Lenses: Eyeglasses  Hearing Aid: None  Clothing: Shorts, Slippers, Undergarments, At bedside, Shirt  Jewelry: None  Electronic Devices: Cell Phone, , At bedside  Weapons (Notify Protective Services/Security): None  Home Medications: None  Valuables Given To: Patient  Provide Name(s) of Who Valuable(s) Were Given To: Jose

## 2024-08-15 NOTE — PLAN OF CARE
Problem: Discharge Planning  Goal: Discharge to home or other facility with appropriate resources  Outcome: Progressing     Problem: Pain  Goal: Verbalizes/displays adequate comfort level or baseline comfort level  Outcome: Progressing  Flowsheets  Taken 8/15/2024 0018  Verbalizes/displays adequate comfort level or baseline comfort level: Encourage patient to monitor pain and request assistance  Taken 8/14/2024 2132  Verbalizes/displays adequate comfort level or baseline comfort level: Encourage patient to monitor pain and request assistance     Problem: Skin/Tissue Integrity - Adult  Goal: Skin integrity remains intact  Outcome: Progressing     Problem: Musculoskeletal - Adult  Goal: Return mobility to safest level of function  Outcome: Progressing     Problem: Genitourinary - Adult  Goal: Absence of urinary retention  Outcome: Progressing     Problem: Genitourinary - Adult  Goal: Absence of urinary retention  Outcome: Progressing     Problem: Safety - Adult  Goal: Free from fall injury  Outcome: Progressing     Problem: Skin/Tissue Integrity  Goal: Absence of new skin breakdown  Description: 1.  Monitor for areas of redness and/or skin breakdown  2.  Assess vascular access sites hourly  3.  Every 4-6 hours minimum:  Change oxygen saturation probe site  4.  Every 4-6 hours:  If on nasal continuous positive airway pressure, respiratory therapy assess nares and determine need for appliance change or resting period.  Outcome: Progressing

## 2024-08-15 NOTE — PROGRESS NOTES
Hepatobiliary and Pancreatic Surgery Progress Note    Chief Complaint:        Chief Complaint   Patient presents with    Alcohol Problem       Drank 4 glasses vodka daily x 2 months, last drink Wednesday 8/7       Subjective: States that he is feeling better.  He tolerated his low-fat diet without any difficulty.    OBJECTIVE      Physical    BP (!) 162/83   Pulse 86   Temp 98.6 °F (37 °C) (Oral)   Resp 18   Ht 1.702 m (5' 7\")   Wt 85 kg (187 lb 6.3 oz)   SpO2 97%   BMI 29.35 kg/m²       General appearance: appears in no acute distress  Lungs:respiratory effort normal without accessory numbers  Heart: no pedal edema  Abdomen: soft, distended, nontympanic, no tenderness to palpation, no peritoneal signs, normoactive bowel sounds  Extremities: ROM normal    ASSESSMENT/PLAN:  55 yo M with alcoholic induced acute interstitial pancreatitis. Has peripancreatic stranding and diffusely steatotic liver.   -Continue low-fat diet  -Okay to discharge home from my standpoint  -I encouraged him to stay away from alcohol in addition to sticking to a low-fat diet  -I discussed with the patient that he will need reimaged in about 3 weeks.  -We will see him back in the office    Electronically signed by Yossi Carter MD on 8/15/2024 at 10:27 AM

## 2024-08-15 NOTE — PROGRESS NOTES
CLINICAL PHARMACY NOTE: MEDS TO BEDS    Total # of Prescriptions Filled: 2   The following medications were delivered to the patient:  Metformin 1000 mg  Midodrine 2.5 mg    Additional Documentation:

## 2024-08-15 NOTE — CARE COORDINATION
8/15/24 1227 CM note: no covid testing. UDS (-) 8/11/24. Hx ETOH use disorder- CIWA scale and phenobarbital taper. Room air. Discharge order noted. PO midodrine & metformin. Discharge plan is home and pt declines need for HHC. Pt does NOT have a PCP. He states he moved to the area in April from [place] and has not established with a PCP yet. Pt states he has a sister that lives 5 minutes away from him and he is going to see if her PCP is accepting new pts/accepts his insurance. Provided pt with [facility]CP list and contact information for [facility] in [place] as alternate choices. Pt lives alone in a 2 floor townhouse + basement, [address], 11 steps to the 2nd floor where his bed & bath is located, and 11 steps to the basement where he does the laundry. He is independent with ADLs, drives, and has NO DME. Pt requests transportation home. Called pts [employer] transportation benefits [place] at [phone] and they are arranging for Lyft transport home. [employer] is to call pts phone and/or nurse station 10-15 min prior to arrival so nursing can get patient to the main entrance for their . Updated pt and pts TRISTON Dia on above. Electronically signed by Alma Espinosa RN on 8/15/2024 at 12:34 PM

## 2024-08-15 NOTE — FLOWSHEET NOTE
Inpatient Wound Care    Admit Date: 8/11/2024  7:57 AM    Reason for consult:  back    Significant history:    Past Medical History:   Diagnosis Date    Diabetes mellitus (HCC)     Diabetic retinopathy (HCC)     Hyperlipidemia     Hypertension        Wound history:      Findings:     08/15/24 1300   Wound 08/15/24 Back Medial   Date First Assessed/Time First Assessed: 08/15/24 1300   Primary Wound Type: Other (comment)  Location: Back  Wound Location Orientation: Medial   Wound Image    Wound Etiology Other   Dressing Status   (partial thickness)   Wound Cleansed Cleansed with saline   Wound Length (cm) 8 cm   Wound Width (cm) 1.8 cm   Wound Depth (cm) 0.1 cm   Wound Surface Area (cm^2) 14.4 cm^2   Wound Volume (cm^3) 1.44 cm^3   Drainage Amount Scant (moist but unmeasurable)   Drainage Description Serous   Odor None   Jemima-wound Assessment Intact       **Informed Consent**    The patient has given verbal consent to have photos taken of back and inserted into their chart as part of their permanent medical record for purposes of documentation, treatment management and/or medical review.   All Images taken on 8/15/24 of patient name: Jsoe Wesley were transmitted and stored on secured Epic  Site located within Media Folder Tab by a registered [employer] Mobile Application Device.       Impression:  pt states he fell  Had large abrasion    Interventions in place:  open to air    Plan:  Plan to use midline protective ointment      Hope Garza RN 8/15/2024 1:02 PM

## 2024-08-19 ENCOUNTER — TELEPHONE (OUTPATIENT)
Dept: HEMATOLOGY | Age: 54
End: 2024-08-19

## 2024-08-19 NOTE — TELEPHONE ENCOUNTER
Patient left a VM about having pain.  I called him back and he stated pain was resolved with Tylenol.  I did make him a hospital follow up appt.  Appt made with Dr. Carter at the [place] office on 9/3/24 at 1:30pm.  I gave him the address and directions to the office and he verbalized understanding and he confirmed this appt.    Electronically signed by Philly Cade RN on 8/19/2024 at 8:37 AM

## 2024-08-26 ENCOUNTER — TELEPHONE (OUTPATIENT)
Dept: HEMATOLOGY | Age: 54
End: 2024-08-26

## 2024-08-26 NOTE — TELEPHONE ENCOUNTER
The patient called and stated that as of now he has to cancel his hospital follow up appt. He stated that his insurance company is denying him to come to our office. He did say that he did file an appeal with his insurance company and that they should be faxing some paperwork over   Electronically signed by Willa White MA on 8/26/2024 at 2:54 PM

## 2024-08-28 ENCOUNTER — TELEPHONE (OUTPATIENT)
Dept: FAMILY MEDICINE CLINIC | Age: 54
End: 2024-08-28

## 2024-08-28 NOTE — TELEPHONE ENCOUNTER
Left vm msg I was returning pt's call.    ----- Message from Lucie MTZ sent at 8/28/2024  9:01 AM EDT -----  Regarding: ECC Appointment Request  E[facility] Appointment Request    Patient needs appointment for ECC Appointment Type: New Patient.    Patient Requested Dates(s): September 6 , 2024  Patient Requested Time: Morning  Provider Name: Clifford Eng DO    Reason for Appointment Request: New Patient - Available appointments did not meet patient need.    Additional Information: Patient you recently got out of the hospital.  --------------------------------------------------------------------------------------------------------------------------    Relationship to Patient: Self     Call Back Information: OK to leave message on voicemail  Preferred Call Back Number: [phone]

## 2024-09-11 SDOH — HEALTH STABILITY: PHYSICAL HEALTH: ON AVERAGE, HOW MANY MINUTES DO YOU ENGAGE IN EXERCISE AT THIS LEVEL?: 30 MIN

## 2024-09-11 SDOH — HEALTH STABILITY: PHYSICAL HEALTH: ON AVERAGE, HOW MANY DAYS PER WEEK DO YOU ENGAGE IN MODERATE TO STRENUOUS EXERCISE (LIKE A BRISK WALK)?: 5 DAYS

## 2024-09-13 ENCOUNTER — OFFICE VISIT (OUTPATIENT)
Dept: INTERNAL MEDICINE | Age: 54
End: 2024-09-13
Payer: COMMERCIAL

## 2024-09-13 VITALS
BODY MASS INDEX: 29.07 KG/M2 | HEART RATE: 85 BPM | WEIGHT: 185.2 LBS | HEIGHT: 67 IN | SYSTOLIC BLOOD PRESSURE: 139 MMHG | RESPIRATION RATE: 18 BRPM | OXYGEN SATURATION: 95 % | DIASTOLIC BLOOD PRESSURE: 78 MMHG | TEMPERATURE: 97.3 F

## 2024-09-13 DIAGNOSIS — I10 PRIMARY HYPERTENSION: Primary | ICD-10-CM

## 2024-09-13 DIAGNOSIS — E78.5 HYPERLIPIDEMIA, UNSPECIFIED HYPERLIPIDEMIA TYPE: ICD-10-CM

## 2024-09-13 DIAGNOSIS — I95.1 ORTHOSTATIC HYPOTENSION: ICD-10-CM

## 2024-09-13 DIAGNOSIS — E11.319 TYPE 2 DIABETES MELLITUS WITH RETINOPATHY, WITHOUT LONG-TERM CURRENT USE OF INSULIN, MACULAR EDEMA PRESENCE UNSPECIFIED, UNSPECIFIED LATERALITY, UNSPECIFIED RETINOPATHY SEVERITY (HCC): ICD-10-CM

## 2024-09-13 DIAGNOSIS — K85.20 ALCOHOL-INDUCED ACUTE PANCREATITIS WITHOUT INFECTION OR NECROSIS: ICD-10-CM

## 2024-09-13 PROBLEM — E11.9 DIABETES MELLITUS (HCC): Status: ACTIVE | Noted: 2024-08-12

## 2024-09-13 PROCEDURE — 99202 OFFICE O/P NEW SF 15 MIN: CPT

## 2024-09-13 SDOH — ECONOMIC STABILITY: FOOD INSECURITY: WITHIN THE PAST 12 MONTHS, THE FOOD YOU BOUGHT JUST DIDN'T LAST AND YOU DIDN'T HAVE MONEY TO GET MORE.: NEVER TRUE

## 2024-09-13 SDOH — ECONOMIC STABILITY: FOOD INSECURITY: WITHIN THE PAST 12 MONTHS, YOU WORRIED THAT YOUR FOOD WOULD RUN OUT BEFORE YOU GOT MONEY TO BUY MORE.: NEVER TRUE

## 2024-09-13 SDOH — ECONOMIC STABILITY: INCOME INSECURITY: HOW HARD IS IT FOR YOU TO PAY FOR THE VERY BASICS LIKE FOOD, HOUSING, MEDICAL CARE, AND HEATING?: NOT HARD AT ALL

## 2024-09-13 ASSESSMENT — PATIENT HEALTH QUESTIONNAIRE - PHQ9
SUM OF ALL RESPONSES TO PHQ QUESTIONS 1-9: 0
2. FEELING DOWN, DEPRESSED OR HOPELESS: NOT AT ALL
SUM OF ALL RESPONSES TO PHQ9 QUESTIONS 1 & 2: 0
SUM OF ALL RESPONSES TO PHQ QUESTIONS 1-9: 0
1. LITTLE INTEREST OR PLEASURE IN DOING THINGS: NOT AT ALL
SUM OF ALL RESPONSES TO PHQ QUESTIONS 1-9: 0
SUM OF ALL RESPONSES TO PHQ QUESTIONS 1-9: 0

## 2024-09-13 ASSESSMENT — ENCOUNTER SYMPTOMS
COUGH: 0
ABDOMINAL PAIN: 0
CONSTIPATION: 1
BACK PAIN: 0
BLOOD IN STOOL: 0
DIARRHEA: 0
SHORTNESS OF BREATH: 0

## 2025-01-31 ENCOUNTER — HOSPITAL ENCOUNTER (INPATIENT)
Age: 55
LOS: 6 days | Discharge: NURSING, REHAB OR LONG-TERM CARE | DRG: 420 | End: 2025-02-06
Attending: EMERGENCY MEDICINE | Admitting: INTERNAL MEDICINE
Payer: COMMERCIAL

## 2025-01-31 ENCOUNTER — APPOINTMENT (OUTPATIENT)
Dept: CT IMAGING | Age: 55
DRG: 420 | End: 2025-01-31
Payer: COMMERCIAL

## 2025-01-31 DIAGNOSIS — E88.89 ALCOHOLIC KETOSIS (HCC): Primary | ICD-10-CM

## 2025-01-31 DIAGNOSIS — K75.9 HEPATITIS: ICD-10-CM

## 2025-01-31 DIAGNOSIS — F10.930 ALCOHOL WITHDRAWAL SYNDROME WITHOUT COMPLICATION (HCC): ICD-10-CM

## 2025-01-31 DIAGNOSIS — R17 ELEVATED BILIRUBIN: ICD-10-CM

## 2025-01-31 PROBLEM — I95.1 ORTHOSTATIC HYPOTENSION: Status: RESOLVED | Noted: 2024-08-14 | Resolved: 2025-01-31

## 2025-01-31 PROBLEM — E87.6 HYPOKALEMIA: Status: RESOLVED | Noted: 2024-08-12 | Resolved: 2025-01-31

## 2025-01-31 PROBLEM — K70.10 ALCOHOLIC HEPATITIS: Status: ACTIVE | Noted: 2025-01-31

## 2025-01-31 PROBLEM — F33.2 SEVERE EPISODE OF RECURRENT MAJOR DEPRESSIVE DISORDER, WITHOUT PSYCHOTIC FEATURES (HCC): Status: ACTIVE | Noted: 2025-01-31

## 2025-01-31 PROBLEM — K85.20 ALCOHOL-INDUCED ACUTE PANCREATITIS: Status: RESOLVED | Noted: 2024-08-13 | Resolved: 2025-01-31

## 2025-01-31 PROBLEM — R06.6 INTRACTABLE HICCUPS: Status: RESOLVED | Noted: 2024-08-12 | Resolved: 2025-01-31

## 2025-01-31 PROBLEM — E87.1 HYPONATREMIA: Status: RESOLVED | Noted: 2024-08-14 | Resolved: 2025-01-31

## 2025-01-31 PROBLEM — R79.89 PSEUDOHYPONATREMIA: Status: ACTIVE | Noted: 2025-01-31

## 2025-01-31 PROBLEM — E11.10 DIABETIC KETOACIDOSIS WITHOUT COMA ASSOCIATED WITH TYPE 2 DIABETES MELLITUS (HCC): Status: ACTIVE | Noted: 2025-01-31

## 2025-01-31 PROBLEM — E87.5 HYPERKALEMIA: Status: ACTIVE | Noted: 2025-01-31

## 2025-01-31 LAB
ALBUMIN SERPL-MCNC: 4.7 G/DL (ref 3.5–5.2)
ALP SERPL-CCNC: 137 U/L (ref 40–129)
ALT SERPL-CCNC: 76 U/L (ref 0–40)
ANION GAP SERPL CALCULATED.3IONS-SCNC: 23 MMOL/L (ref 7–16)
ANION GAP SERPL CALCULATED.3IONS-SCNC: 26 MMOL/L (ref 7–16)
ANION GAP SERPL CALCULATED.3IONS-SCNC: 33 MMOL/L (ref 7–16)
ANION GAP SERPL CALCULATED.3IONS-SCNC: 36 MMOL/L (ref 7–16)
APAP SERPL-MCNC: <5 UG/ML (ref 10–30)
AST SERPL-CCNC: 113 U/L (ref 0–39)
B PARAP IS1001 DNA NPH QL NAA+NON-PROBE: NOT DETECTED
B PERT DNA SPEC QL NAA+PROBE: NOT DETECTED
B-OH-BUTYR SERPL-MCNC: >4.5 MMOL/L (ref 0.02–0.27)
BASOPHILS # BLD: 0 K/UL (ref 0–0.2)
BASOPHILS NFR BLD: 0 % (ref 0–2)
BILIRUB DIRECT SERPL-MCNC: 1.5 MG/DL (ref 0–0.3)
BILIRUB INDIRECT SERPL-MCNC: 1.4 MG/DL (ref 0–1)
BILIRUB SERPL-MCNC: 2.9 MG/DL (ref 0–1.2)
BUN SERPL-MCNC: 21 MG/DL (ref 6–20)
BUN SERPL-MCNC: 25 MG/DL (ref 6–20)
C PNEUM DNA NPH QL NAA+NON-PROBE: NOT DETECTED
CALCIUM SERPL-MCNC: 10.3 MG/DL (ref 8.6–10.2)
CALCIUM SERPL-MCNC: 8.6 MG/DL (ref 8.6–10.2)
CALCIUM SERPL-MCNC: 9.2 MG/DL (ref 8.6–10.2)
CALCIUM SERPL-MCNC: 9.2 MG/DL (ref 8.6–10.2)
CHLORIDE SERPL-SCNC: 85 MMOL/L (ref 98–107)
CHLORIDE SERPL-SCNC: 94 MMOL/L (ref 98–107)
CHLORIDE SERPL-SCNC: 94 MMOL/L (ref 98–107)
CHLORIDE SERPL-SCNC: 95 MMOL/L (ref 98–107)
CO2 SERPL-SCNC: 10 MMOL/L (ref 22–29)
CO2 SERPL-SCNC: 10 MMOL/L (ref 22–29)
CO2 SERPL-SCNC: 14 MMOL/L (ref 22–29)
CO2 SERPL-SCNC: 15 MMOL/L (ref 22–29)
CREAT SERPL-MCNC: 0.9 MG/DL (ref 0.7–1.2)
CREAT SERPL-MCNC: 1 MG/DL (ref 0.7–1.2)
CREAT SERPL-MCNC: 1 MG/DL (ref 0.7–1.2)
CREAT SERPL-MCNC: 1.1 MG/DL (ref 0.7–1.2)
EKG ATRIAL RATE: 110 BPM
EKG P AXIS: 45 DEGREES
EKG P-R INTERVAL: 140 MS
EKG Q-T INTERVAL: 328 MS
EKG QRS DURATION: 76 MS
EKG QTC CALCULATION (BAZETT): 443 MS
EKG R AXIS: 37 DEGREES
EKG T AXIS: -32 DEGREES
EKG VENTRICULAR RATE: 110 BPM
EOSINOPHIL # BLD: 0 K/UL (ref 0.05–0.5)
EOSINOPHILS RELATIVE PERCENT: 0 % (ref 0–6)
ERYTHROCYTE [DISTWIDTH] IN BLOOD BY AUTOMATED COUNT: 13 % (ref 11.5–15)
ETHANOLAMINE SERPL-MCNC: <10 MG/DL (ref 0–0.08)
FLUAV RNA NPH QL NAA+NON-PROBE: NOT DETECTED
FLUAV RNA RESP QL NAA+PROBE: NOT DETECTED
FLUBV RNA NPH QL NAA+NON-PROBE: NOT DETECTED
FLUBV RNA RESP QL NAA+PROBE: NOT DETECTED
GFR, ESTIMATED: 79 ML/MIN/1.73M2
GFR, ESTIMATED: >90 ML/MIN/1.73M2
GLUCOSE BLD-MCNC: 134 MG/DL (ref 74–99)
GLUCOSE BLD-MCNC: 166 MG/DL (ref 74–99)
GLUCOSE BLD-MCNC: 168 MG/DL (ref 74–99)
GLUCOSE BLD-MCNC: 183 MG/DL (ref 74–99)
GLUCOSE BLD-MCNC: 183 MG/DL (ref 74–99)
GLUCOSE BLD-MCNC: 188 MG/DL (ref 74–99)
GLUCOSE BLD-MCNC: 203 MG/DL (ref 74–99)
GLUCOSE BLD-MCNC: 209 MG/DL (ref 74–99)
GLUCOSE SERPL-MCNC: 134 MG/DL (ref 74–99)
GLUCOSE SERPL-MCNC: 192 MG/DL (ref 74–99)
GLUCOSE SERPL-MCNC: 220 MG/DL (ref 74–99)
GLUCOSE SERPL-MCNC: 254 MG/DL (ref 74–99)
HADV DNA NPH QL NAA+NON-PROBE: NOT DETECTED
HBA1C MFR BLD: 5.9 % (ref 4–5.6)
HCOV 229E RNA NPH QL NAA+NON-PROBE: NOT DETECTED
HCOV HKU1 RNA NPH QL NAA+NON-PROBE: NOT DETECTED
HCOV NL63 RNA NPH QL NAA+NON-PROBE: NOT DETECTED
HCOV OC43 RNA NPH QL NAA+NON-PROBE: NOT DETECTED
HCT VFR BLD AUTO: 36.8 % (ref 37–54)
HGB BLD-MCNC: 12.6 G/DL (ref 12.5–16.5)
HMPV RNA NPH QL NAA+NON-PROBE: NOT DETECTED
HPIV1 RNA NPH QL NAA+NON-PROBE: NOT DETECTED
HPIV2 RNA NPH QL NAA+NON-PROBE: NOT DETECTED
HPIV3 RNA NPH QL NAA+NON-PROBE: NOT DETECTED
HPIV4 RNA NPH QL NAA+NON-PROBE: NOT DETECTED
INR PPP: 1
INR PPP: 1
LACTATE BLDV-SCNC: 2 MMOL/L (ref 0.5–2.2)
LIPASE SERPL-CCNC: 89 U/L (ref 13–60)
LYMPHOCYTES NFR BLD: 0.28 K/UL (ref 1.5–4)
LYMPHOCYTES RELATIVE PERCENT: 5 % (ref 20–42)
M PNEUMO DNA NPH QL NAA+NON-PROBE: NOT DETECTED
MAGNESIUM SERPL-MCNC: 1.6 MG/DL (ref 1.6–2.6)
MAGNESIUM SERPL-MCNC: 1.8 MG/DL (ref 1.6–2.6)
MAGNESIUM SERPL-MCNC: 2.2 MG/DL (ref 1.6–2.6)
MCH RBC QN AUTO: 35.8 PG (ref 26–35)
MCHC RBC AUTO-ENTMCNC: 34.2 G/DL (ref 32–34.5)
MCV RBC AUTO: 104.5 FL (ref 80–99.9)
MONOCYTES NFR BLD: 0.09 K/UL (ref 0.1–0.95)
MONOCYTES NFR BLD: 2 % (ref 2–12)
MYELOCYTES ABSOLUTE COUNT: 0.09 K/UL
MYELOCYTES: 2 %
NEUTROPHILS NFR BLD: 91 % (ref 43–80)
NEUTS SEG NFR BLD: 4.93 K/UL (ref 1.8–7.3)
NUCLEATED RED BLOOD CELLS: 1 PER 100 WBC
PARTIAL THROMBOPLASTIN TIME: 24.8 SEC (ref 24.5–35.1)
PH VENOUS: 7.28 (ref 7.35–7.45)
PHOSPHATE SERPL-MCNC: 0.6 MG/DL (ref 2.5–4.5)
PHOSPHATE SERPL-MCNC: 1.4 MG/DL (ref 2.5–4.5)
PLATELET # BLD AUTO: 142 K/UL (ref 130–450)
PMV BLD AUTO: 10 FL (ref 7–12)
POTASSIUM SERPL-SCNC: 3.7 MMOL/L (ref 3.5–5)
POTASSIUM SERPL-SCNC: 4 MMOL/L (ref 3.5–5)
POTASSIUM SERPL-SCNC: 4.5 MMOL/L (ref 3.5–5)
POTASSIUM SERPL-SCNC: 5.3 MMOL/L (ref 3.5–5)
PROT SERPL-MCNC: 8.3 G/DL (ref 6.4–8.3)
PROTHROMBIN TIME: 10.5 SEC (ref 9.3–12.4)
PROTHROMBIN TIME: 11.1 SEC (ref 9.3–12.4)
RBC # BLD AUTO: 3.52 M/UL (ref 3.8–5.8)
RBC # BLD: ABNORMAL 10*6/UL
RSV RNA NPH QL NAA+NON-PROBE: NOT DETECTED
RV+EV RNA NPH QL NAA+NON-PROBE: NOT DETECTED
SALICYLATES SERPL-MCNC: 0.4 MG/DL (ref 0–30)
SARS-COV-2 RNA NPH QL NAA+NON-PROBE: NOT DETECTED
SARS-COV-2 RNA RESP QL NAA+PROBE: NOT DETECTED
SODIUM SERPL-SCNC: 131 MMOL/L (ref 132–146)
SODIUM SERPL-SCNC: 133 MMOL/L (ref 132–146)
SODIUM SERPL-SCNC: 134 MMOL/L (ref 132–146)
SODIUM SERPL-SCNC: 137 MMOL/L (ref 132–146)
SOURCE: NORMAL
SPECIMEN DESCRIPTION: NORMAL
SPECIMEN DESCRIPTION: NORMAL
TOXIC TRICYCLIC SC,BLOOD: NEGATIVE
WBC OTHER # BLD: 5.4 K/UL (ref 4.5–11.5)

## 2025-01-31 PROCEDURE — 83735 ASSAY OF MAGNESIUM: CPT

## 2025-01-31 PROCEDURE — 80179 DRUG ASSAY SALICYLATE: CPT

## 2025-01-31 PROCEDURE — 99223 1ST HOSP IP/OBS HIGH 75: CPT | Performed by: INTERNAL MEDICINE

## 2025-01-31 PROCEDURE — 74177 CT ABD & PELVIS W/CONTRAST: CPT

## 2025-01-31 PROCEDURE — 2580000003 HC RX 258: Performed by: INTERNAL MEDICINE

## 2025-01-31 PROCEDURE — 2580000003 HC RX 258

## 2025-01-31 PROCEDURE — 80053 COMPREHEN METABOLIC PANEL: CPT

## 2025-01-31 PROCEDURE — 87636 SARSCOV2 & INF A&B AMP PRB: CPT

## 2025-01-31 PROCEDURE — 83605 ASSAY OF LACTIC ACID: CPT

## 2025-01-31 PROCEDURE — 99285 EMERGENCY DEPT VISIT HI MDM: CPT

## 2025-01-31 PROCEDURE — 85610 PROTHROMBIN TIME: CPT

## 2025-01-31 PROCEDURE — 2500000003 HC RX 250 WO HCPCS: Performed by: INTERNAL MEDICINE

## 2025-01-31 PROCEDURE — 6370000000 HC RX 637 (ALT 250 FOR IP): Performed by: INTERNAL MEDICINE

## 2025-01-31 PROCEDURE — 0202U NFCT DS 22 TRGT SARS-COV-2: CPT

## 2025-01-31 PROCEDURE — 96375 TX/PRO/DX INJ NEW DRUG ADDON: CPT

## 2025-01-31 PROCEDURE — 6360000002 HC RX W HCPCS

## 2025-01-31 PROCEDURE — 96374 THER/PROPH/DIAG INJ IV PUSH: CPT

## 2025-01-31 PROCEDURE — 6360000002 HC RX W HCPCS: Performed by: INTERNAL MEDICINE

## 2025-01-31 PROCEDURE — 82010 KETONE BODYS QUAN: CPT

## 2025-01-31 PROCEDURE — 80048 BASIC METABOLIC PNL TOTAL CA: CPT

## 2025-01-31 PROCEDURE — G0480 DRUG TEST DEF 1-7 CLASSES: HCPCS

## 2025-01-31 PROCEDURE — 83036 HEMOGLOBIN GLYCOSYLATED A1C: CPT

## 2025-01-31 PROCEDURE — 85025 COMPLETE CBC W/AUTO DIFF WBC: CPT

## 2025-01-31 PROCEDURE — 2000000000 HC ICU R&B

## 2025-01-31 PROCEDURE — 80307 DRUG TEST PRSMV CHEM ANLYZR: CPT

## 2025-01-31 PROCEDURE — 82962 GLUCOSE BLOOD TEST: CPT

## 2025-01-31 PROCEDURE — 93010 ELECTROCARDIOGRAM REPORT: CPT | Performed by: INTERNAL MEDICINE

## 2025-01-31 PROCEDURE — 93005 ELECTROCARDIOGRAM TRACING: CPT

## 2025-01-31 PROCEDURE — 87081 CULTURE SCREEN ONLY: CPT

## 2025-01-31 PROCEDURE — 87389 HIV-1 AG W/HIV-1&-2 AB AG IA: CPT

## 2025-01-31 PROCEDURE — 82800 BLOOD PH: CPT

## 2025-01-31 PROCEDURE — 6360000004 HC RX CONTRAST MEDICATION: Performed by: RADIOLOGY

## 2025-01-31 PROCEDURE — 85730 THROMBOPLASTIN TIME PARTIAL: CPT

## 2025-01-31 PROCEDURE — 36415 COLL VENOUS BLD VENIPUNCTURE: CPT

## 2025-01-31 PROCEDURE — 80143 DRUG ASSAY ACETAMINOPHEN: CPT

## 2025-01-31 PROCEDURE — 84100 ASSAY OF PHOSPHORUS: CPT

## 2025-01-31 PROCEDURE — 2500000003 HC RX 250 WO HCPCS

## 2025-01-31 PROCEDURE — 82248 BILIRUBIN DIRECT: CPT

## 2025-01-31 PROCEDURE — 83690 ASSAY OF LIPASE: CPT

## 2025-01-31 RX ORDER — DEXTROSE MONOHYDRATE AND SODIUM CHLORIDE 5; .45 G/100ML; G/100ML
INJECTION, SOLUTION INTRAVENOUS
Status: COMPLETED
Start: 2025-01-31 | End: 2025-01-31

## 2025-01-31 RX ORDER — SODIUM CHLORIDE 9 MG/ML
INJECTION, SOLUTION INTRAVENOUS CONTINUOUS
Status: DISCONTINUED | OUTPATIENT
Start: 2025-01-31 | End: 2025-02-02

## 2025-01-31 RX ORDER — LORAZEPAM 2 MG/ML
2 INJECTION INTRAMUSCULAR
Status: DISCONTINUED | OUTPATIENT
Start: 2025-01-31 | End: 2025-02-01

## 2025-01-31 RX ORDER — LORAZEPAM 2 MG/ML
1 INJECTION INTRAMUSCULAR
Status: DISCONTINUED | OUTPATIENT
Start: 2025-01-31 | End: 2025-02-01

## 2025-01-31 RX ORDER — MAGNESIUM SULFATE IN WATER 40 MG/ML
2000 INJECTION, SOLUTION INTRAVENOUS PRN
Status: DISCONTINUED | OUTPATIENT
Start: 2025-01-31 | End: 2025-02-03

## 2025-01-31 RX ORDER — PHENOBARBITAL 32.4 MG/1
97.2 TABLET ORAL EVERY 4 HOURS
Status: COMPLETED | OUTPATIENT
Start: 2025-01-31 | End: 2025-02-01

## 2025-01-31 RX ORDER — DEXTROSE MONOHYDRATE AND SODIUM CHLORIDE 5; .9 G/100ML; G/100ML
INJECTION, SOLUTION INTRAVENOUS CONTINUOUS
Status: DISCONTINUED | OUTPATIENT
Start: 2025-01-31 | End: 2025-02-01

## 2025-01-31 RX ORDER — SODIUM CHLORIDE 0.9 % (FLUSH) 0.9 %
5-40 SYRINGE (ML) INJECTION EVERY 12 HOURS SCHEDULED
Status: DISCONTINUED | OUTPATIENT
Start: 2025-01-31 | End: 2025-02-06 | Stop reason: HOSPADM

## 2025-01-31 RX ORDER — FENTANYL CITRATE 50 UG/ML
50 INJECTION, SOLUTION INTRAMUSCULAR; INTRAVENOUS ONCE
Status: COMPLETED | OUTPATIENT
Start: 2025-01-31 | End: 2025-01-31

## 2025-01-31 RX ORDER — 0.9 % SODIUM CHLORIDE 0.9 %
1000 INTRAVENOUS SOLUTION INTRAVENOUS ONCE
Status: COMPLETED | OUTPATIENT
Start: 2025-01-31 | End: 2025-01-31

## 2025-01-31 RX ORDER — LORAZEPAM 2 MG/ML
4 INJECTION INTRAMUSCULAR
Status: DISCONTINUED | OUTPATIENT
Start: 2025-01-31 | End: 2025-02-01

## 2025-01-31 RX ORDER — POTASSIUM CHLORIDE 7.45 MG/ML
10 INJECTION INTRAVENOUS PRN
Status: DISCONTINUED | OUTPATIENT
Start: 2025-01-31 | End: 2025-02-03

## 2025-01-31 RX ORDER — IOPAMIDOL 755 MG/ML
75 INJECTION, SOLUTION INTRAVASCULAR
Status: COMPLETED | OUTPATIENT
Start: 2025-01-31 | End: 2025-01-31

## 2025-01-31 RX ORDER — DEXTROSE MONOHYDRATE AND SODIUM CHLORIDE 5; .45 G/100ML; G/100ML
INJECTION, SOLUTION INTRAVENOUS CONTINUOUS PRN
Status: DISCONTINUED | OUTPATIENT
Start: 2025-01-31 | End: 2025-01-31

## 2025-01-31 RX ORDER — PHENOBARBITAL 32.4 MG/1
64.8 TABLET ORAL EVERY 6 HOURS
Status: COMPLETED | OUTPATIENT
Start: 2025-02-01 | End: 2025-02-02

## 2025-01-31 RX ORDER — THIAMINE HYDROCHLORIDE 100 MG/ML
100 INJECTION, SOLUTION INTRAMUSCULAR; INTRAVENOUS DAILY
Status: DISCONTINUED | OUTPATIENT
Start: 2025-01-31 | End: 2025-02-01

## 2025-01-31 RX ORDER — LORAZEPAM 1 MG/1
1 TABLET ORAL
Status: DISCONTINUED | OUTPATIENT
Start: 2025-01-31 | End: 2025-02-01

## 2025-01-31 RX ORDER — PHENOBARBITAL SODIUM 65 MG/ML
130 INJECTION, SOLUTION INTRAMUSCULAR; INTRAVENOUS ONCE
Status: COMPLETED | OUTPATIENT
Start: 2025-01-31 | End: 2025-01-31

## 2025-01-31 RX ORDER — SODIUM CHLORIDE 0.9 % (FLUSH) 0.9 %
5-40 SYRINGE (ML) INJECTION PRN
Status: DISCONTINUED | OUTPATIENT
Start: 2025-01-31 | End: 2025-02-06 | Stop reason: HOSPADM

## 2025-01-31 RX ORDER — SODIUM CHLORIDE 9 MG/ML
INJECTION, SOLUTION INTRAVENOUS PRN
Status: DISCONTINUED | OUTPATIENT
Start: 2025-01-31 | End: 2025-02-06 | Stop reason: HOSPADM

## 2025-01-31 RX ORDER — ONDANSETRON 2 MG/ML
4 INJECTION INTRAMUSCULAR; INTRAVENOUS ONCE
Status: COMPLETED | OUTPATIENT
Start: 2025-01-31 | End: 2025-01-31

## 2025-01-31 RX ORDER — LORAZEPAM 1 MG/1
3 TABLET ORAL
Status: DISCONTINUED | OUTPATIENT
Start: 2025-01-31 | End: 2025-02-01

## 2025-01-31 RX ORDER — PHENOBARBITAL 32.4 MG/1
32.4 TABLET ORAL 3 TIMES DAILY
Status: DISCONTINUED | OUTPATIENT
Start: 2025-02-02 | End: 2025-02-02

## 2025-01-31 RX ORDER — 0.9 % SODIUM CHLORIDE 0.9 %
15 INTRAVENOUS SOLUTION INTRAVENOUS ONCE
Status: COMPLETED | OUTPATIENT
Start: 2025-01-31 | End: 2025-01-31

## 2025-01-31 RX ORDER — LORAZEPAM 1 MG/1
4 TABLET ORAL
Status: DISCONTINUED | OUTPATIENT
Start: 2025-01-31 | End: 2025-02-01

## 2025-01-31 RX ORDER — FOLIC ACID 1 MG/1
1 TABLET ORAL DAILY
Status: DISCONTINUED | OUTPATIENT
Start: 2025-01-31 | End: 2025-02-06 | Stop reason: HOSPADM

## 2025-01-31 RX ORDER — POLYETHYLENE GLYCOL 3350 17 G/17G
17 POWDER, FOR SOLUTION ORAL DAILY PRN
Status: DISCONTINUED | OUTPATIENT
Start: 2025-01-31 | End: 2025-02-06 | Stop reason: HOSPADM

## 2025-01-31 RX ORDER — OXYCODONE HYDROCHLORIDE 5 MG/1
5 TABLET ORAL EVERY 6 HOURS PRN
Status: DISCONTINUED | OUTPATIENT
Start: 2025-01-31 | End: 2025-02-06 | Stop reason: HOSPADM

## 2025-01-31 RX ORDER — PHENOBARBITAL 32.4 MG/1
16.2 TABLET ORAL 2 TIMES DAILY
Status: DISCONTINUED | OUTPATIENT
Start: 2025-02-03 | End: 2025-02-02

## 2025-01-31 RX ORDER — DEXMEDETOMIDINE HYDROCHLORIDE 4 UG/ML
.1-1.5 INJECTION, SOLUTION INTRAVENOUS CONTINUOUS
Status: DISCONTINUED | OUTPATIENT
Start: 2025-01-31 | End: 2025-02-03

## 2025-01-31 RX ORDER — ENOXAPARIN SODIUM 100 MG/ML
40 INJECTION SUBCUTANEOUS DAILY
Status: DISCONTINUED | OUTPATIENT
Start: 2025-01-31 | End: 2025-02-06 | Stop reason: HOSPADM

## 2025-01-31 RX ORDER — MAGNESIUM SULFATE IN WATER 40 MG/ML
2000 INJECTION, SOLUTION INTRAVENOUS ONCE
Status: COMPLETED | OUTPATIENT
Start: 2025-01-31 | End: 2025-01-31

## 2025-01-31 RX ORDER — OXYCODONE HYDROCHLORIDE 5 MG/1
10 TABLET ORAL EVERY 6 HOURS PRN
Status: DISCONTINUED | OUTPATIENT
Start: 2025-01-31 | End: 2025-02-06 | Stop reason: HOSPADM

## 2025-01-31 RX ORDER — LORAZEPAM 1 MG/1
2 TABLET ORAL
Status: DISCONTINUED | OUTPATIENT
Start: 2025-01-31 | End: 2025-02-01

## 2025-01-31 RX ORDER — LORAZEPAM 2 MG/ML
3 INJECTION INTRAMUSCULAR
Status: DISCONTINUED | OUTPATIENT
Start: 2025-01-31 | End: 2025-02-01

## 2025-01-31 RX ADMIN — OXYCODONE 10 MG: 5 TABLET ORAL at 17:20

## 2025-01-31 RX ADMIN — SODIUM PHOSPHATE, MONOBASIC, MONOHYDRATE AND SODIUM PHOSPHATE, DIBASIC, ANHYDROUS 30 MMOL: 142; 276 INJECTION, SOLUTION INTRAVENOUS at 19:52

## 2025-01-31 RX ADMIN — ONDANSETRON 4 MG: 2 INJECTION, SOLUTION INTRAMUSCULAR; INTRAVENOUS at 09:24

## 2025-01-31 RX ADMIN — THIAMINE HYDROCHLORIDE 100 MG: 100 INJECTION, SOLUTION INTRAMUSCULAR; INTRAVENOUS at 16:18

## 2025-01-31 RX ADMIN — PHENOBARBITAL 97.2 MG: 32.4 TABLET ORAL at 16:18

## 2025-01-31 RX ADMIN — FENTANYL CITRATE 50 MCG: 50 INJECTION, SOLUTION INTRAMUSCULAR; INTRAVENOUS at 12:20

## 2025-01-31 RX ADMIN — POTASSIUM CHLORIDE 10 MEQ: 7.46 INJECTION, SOLUTION INTRAVENOUS at 22:57

## 2025-01-31 RX ADMIN — DEXTROSE MONOHYDRATE AND SODIUM CHLORIDE: 5; .45 INJECTION, SOLUTION INTRAVENOUS at 14:33

## 2025-01-31 RX ADMIN — PHENOBARBITAL 97.2 MG: 32.4 TABLET ORAL at 23:50

## 2025-01-31 RX ADMIN — SODIUM CHLORIDE 1000 ML: 9 INJECTION, SOLUTION INTRAVENOUS at 09:21

## 2025-01-31 RX ADMIN — DEXTROSE AND SODIUM CHLORIDE: 5; 900 INJECTION, SOLUTION INTRAVENOUS at 21:28

## 2025-01-31 RX ADMIN — SODIUM CHLORIDE 1224 ML: 9 INJECTION, SOLUTION INTRAVENOUS at 14:00

## 2025-01-31 RX ADMIN — PHENOBARBITAL SODIUM 130 MG: 65 INJECTION INTRAMUSCULAR at 09:26

## 2025-01-31 RX ADMIN — PHENOBARBITAL 97.2 MG: 32.4 TABLET ORAL at 20:19

## 2025-01-31 RX ADMIN — Medication 10 ML: at 20:20

## 2025-01-31 RX ADMIN — DEXTROSE AND SODIUM CHLORIDE: 5; 450 INJECTION, SOLUTION INTRAVENOUS at 14:33

## 2025-01-31 RX ADMIN — POTASSIUM CHLORIDE 10 MEQ: 7.46 INJECTION, SOLUTION INTRAVENOUS at 23:47

## 2025-01-31 RX ADMIN — FOLIC ACID 1 MG: 1 TABLET ORAL at 16:18

## 2025-01-31 RX ADMIN — ENOXAPARIN SODIUM 40 MG: 100 INJECTION SUBCUTANEOUS at 16:18

## 2025-01-31 RX ADMIN — SODIUM CHLORIDE 40 MG: 9 INJECTION INTRAMUSCULAR; INTRAVENOUS; SUBCUTANEOUS at 18:37

## 2025-01-31 RX ADMIN — DEXMEDETOMIDINE HYDROCHLORIDE 0.4 MCG/KG/HR: 400 INJECTION, SOLUTION INTRAVENOUS at 18:24

## 2025-01-31 RX ADMIN — MAGNESIUM SULFATE HEPTAHYDRATE 2000 MG: 40 INJECTION, SOLUTION INTRAVENOUS at 19:46

## 2025-01-31 RX ADMIN — IOPAMIDOL 75 ML: 755 INJECTION, SOLUTION INTRAVENOUS at 11:06

## 2025-01-31 ASSESSMENT — PAIN SCALES - GENERAL
PAINLEVEL_OUTOF10: 7
PAINLEVEL_OUTOF10: 9
PAINLEVEL_OUTOF10: 9
PAINLEVEL_OUTOF10: 0

## 2025-01-31 ASSESSMENT — PAIN DESCRIPTION - DESCRIPTORS: DESCRIPTORS: ACHING;DISCOMFORT

## 2025-01-31 ASSESSMENT — LIFESTYLE VARIABLES
HOW MANY STANDARD DRINKS CONTAINING ALCOHOL DO YOU HAVE ON A TYPICAL DAY: 3 OR 4
HOW OFTEN DO YOU HAVE A DRINK CONTAINING ALCOHOL: 4 OR MORE TIMES A WEEK

## 2025-01-31 ASSESSMENT — PAIN - FUNCTIONAL ASSESSMENT
PAIN_FUNCTIONAL_ASSESSMENT: ACTIVITIES ARE NOT PREVENTED
PAIN_FUNCTIONAL_ASSESSMENT: NONE - DENIES PAIN

## 2025-01-31 ASSESSMENT — PAIN DESCRIPTION - LOCATION: LOCATION: ABDOMEN

## 2025-01-31 NOTE — ED PROVIDER NOTES
[facility] ICU  EMERGENCY DEPARTMENT ENCOUNTER        Pt Name: Jose Wesley  MRN: [id number]  Birthdate 1970  Date of evaluation: 1/31/2025  Provider: Jeremy Thibodeaux DO  PCP: Alena Guerrero MD  Note Started: 10:29 AM EST 1/31/25    CHIEF COMPLAINT       Chief Complaint   Patient presents with    Abdominal Pain     Daily drinking last 3 month approximately 4 cups of vodka and started to notice some abd pain and spasms coughing spasms       HISTORY OF PRESENT ILLNESS: 1 or more Elements   History From: Patient      Jose Wesley is a 54 y.o. male who presents to the emergency department for evaluation of abdominal pain.  Patient states this started approximately yesterday.  Patient states that has been drinking 4 cups of vodka a day for the last few months.  Patient states he also has some cough that he describes as \"guttural\".  Patient denies any chest pain or shortness of breath.  Patient's last drink was yesterday evening.  Patient denies any vomiting but states he is coughing up mucus.  Patient states that he did have some chills after coughing.  Patient denies any shortness of breath associate with this.  Patient denies any lower abdominal pain.  Patient states that he still currently drinking due to depression.  Patient denies any suicidal or homicidal ideation.    Review of Systems   Constitutional:  Positive for chills. Negative for activity change, appetite change, fatigue and fever.   HENT:  Negative for congestion and sore throat.    Eyes:  Negative for visual disturbance.   Respiratory:  Positive for cough. Negative for apnea, chest tightness, shortness of breath and wheezing.    Cardiovascular:  Negative for chest pain.   Gastrointestinal:  Positive for abdominal pain, nausea and vomiting. Negative for abdominal distention, constipation and diarrhea.   Endocrine: Negative for polyuria.   Genitourinary:  Negative for decreased urine volume, dysuria and urgency.   Musculoskeletal:  Negative for back

## 2025-02-01 PROBLEM — D61.818 PANCYTOPENIA (HCC): Status: ACTIVE | Noted: 2025-02-01

## 2025-02-01 LAB
ALBUMIN SERPL-MCNC: 3.6 G/DL (ref 3.5–5.2)
ALP SERPL-CCNC: 91 U/L (ref 40–129)
ALT SERPL-CCNC: 43 U/L (ref 0–40)
ANION GAP SERPL CALCULATED.3IONS-SCNC: 13 MMOL/L (ref 7–16)
ANION GAP SERPL CALCULATED.3IONS-SCNC: 14 MMOL/L (ref 7–16)
ANION GAP SERPL CALCULATED.3IONS-SCNC: 18 MMOL/L (ref 7–16)
ANION GAP SERPL CALCULATED.3IONS-SCNC: 19 MMOL/L (ref 7–16)
AST SERPL-CCNC: 56 U/L (ref 0–39)
B-OH-BUTYR SERPL-MCNC: 3.83 MMOL/L (ref 0.02–0.27)
BASOPHILS # BLD: 0.03 K/UL (ref 0–0.2)
BASOPHILS NFR BLD: 1 % (ref 0–2)
BILIRUB DIRECT SERPL-MCNC: 1 MG/DL (ref 0–0.3)
BILIRUB INDIRECT SERPL-MCNC: 0.6 MG/DL (ref 0–1)
BILIRUB SERPL-MCNC: 1.6 MG/DL (ref 0–1.2)
BUN SERPL-MCNC: 11 MG/DL (ref 6–20)
BUN SERPL-MCNC: 15 MG/DL (ref 6–20)
BUN SERPL-MCNC: 17 MG/DL (ref 6–20)
BUN SERPL-MCNC: 19 MG/DL (ref 6–20)
CALCIUM SERPL-MCNC: 7.2 MG/DL (ref 8.6–10.2)
CALCIUM SERPL-MCNC: 7.5 MG/DL (ref 8.6–10.2)
CALCIUM SERPL-MCNC: 7.9 MG/DL (ref 8.6–10.2)
CALCIUM SERPL-MCNC: 8.3 MG/DL (ref 8.6–10.2)
CHLORIDE SERPL-SCNC: 94 MMOL/L (ref 98–107)
CHLORIDE SERPL-SCNC: 94 MMOL/L (ref 98–107)
CHLORIDE SERPL-SCNC: 98 MMOL/L (ref 98–107)
CHLORIDE SERPL-SCNC: 99 MMOL/L (ref 98–107)
CO2 SERPL-SCNC: 16 MMOL/L (ref 22–29)
CO2 SERPL-SCNC: 18 MMOL/L (ref 22–29)
CO2 SERPL-SCNC: 18 MMOL/L (ref 22–29)
CO2 SERPL-SCNC: 19 MMOL/L (ref 22–29)
CREAT SERPL-MCNC: 0.8 MG/DL (ref 0.7–1.2)
CREAT SERPL-MCNC: 0.8 MG/DL (ref 0.7–1.2)
CREAT SERPL-MCNC: 0.9 MG/DL (ref 0.7–1.2)
CREAT SERPL-MCNC: 1 MG/DL (ref 0.7–1.2)
EOSINOPHIL # BLD: 0.03 K/UL (ref 0.05–0.5)
EOSINOPHILS RELATIVE PERCENT: 1 % (ref 0–6)
ERYTHROCYTE [DISTWIDTH] IN BLOOD BY AUTOMATED COUNT: 12.5 % (ref 11.5–15)
GFR, ESTIMATED: >90 ML/MIN/1.73M2
GLUCOSE BLD-MCNC: 106 MG/DL (ref 74–99)
GLUCOSE BLD-MCNC: 119 MG/DL (ref 74–99)
GLUCOSE BLD-MCNC: 123 MG/DL (ref 74–99)
GLUCOSE BLD-MCNC: 131 MG/DL (ref 74–99)
GLUCOSE BLD-MCNC: 145 MG/DL (ref 74–99)
GLUCOSE BLD-MCNC: 148 MG/DL (ref 74–99)
GLUCOSE BLD-MCNC: 152 MG/DL (ref 74–99)
GLUCOSE BLD-MCNC: 157 MG/DL (ref 74–99)
GLUCOSE BLD-MCNC: 162 MG/DL (ref 74–99)
GLUCOSE BLD-MCNC: 177 MG/DL (ref 74–99)
GLUCOSE BLD-MCNC: 181 MG/DL (ref 74–99)
GLUCOSE BLD-MCNC: 185 MG/DL (ref 74–99)
GLUCOSE BLD-MCNC: 186 MG/DL (ref 74–99)
GLUCOSE BLD-MCNC: 190 MG/DL (ref 74–99)
GLUCOSE BLD-MCNC: 200 MG/DL (ref 74–99)
GLUCOSE BLD-MCNC: 201 MG/DL (ref 74–99)
GLUCOSE BLD-MCNC: 208 MG/DL (ref 74–99)
GLUCOSE BLD-MCNC: 209 MG/DL (ref 74–99)
GLUCOSE BLD-MCNC: 219 MG/DL (ref 74–99)
GLUCOSE BLD-MCNC: 253 MG/DL (ref 74–99)
GLUCOSE BLD-MCNC: 254 MG/DL (ref 74–99)
GLUCOSE BLD-MCNC: 254 MG/DL (ref 74–99)
GLUCOSE SERPL-MCNC: 125 MG/DL (ref 74–99)
GLUCOSE SERPL-MCNC: 188 MG/DL (ref 74–99)
GLUCOSE SERPL-MCNC: 212 MG/DL (ref 74–99)
GLUCOSE SERPL-MCNC: 257 MG/DL (ref 74–99)
HCT VFR BLD AUTO: 27.4 % (ref 37–54)
HGB BLD-MCNC: 9.5 G/DL (ref 12.5–16.5)
LIPASE SERPL-CCNC: 631 U/L (ref 13–60)
LYMPHOCYTES NFR BLD: 0.38 K/UL (ref 1.5–4)
LYMPHOCYTES RELATIVE PERCENT: 12 % (ref 20–42)
MAGNESIUM SERPL-MCNC: 1.5 MG/DL (ref 1.6–2.6)
MAGNESIUM SERPL-MCNC: 1.7 MG/DL (ref 1.6–2.6)
MAGNESIUM SERPL-MCNC: 2 MG/DL (ref 1.6–2.6)
MAGNESIUM SERPL-MCNC: 2.1 MG/DL (ref 1.6–2.6)
MCH RBC QN AUTO: 35.2 PG (ref 26–35)
MCHC RBC AUTO-ENTMCNC: 34.7 G/DL (ref 32–34.5)
MCV RBC AUTO: 101.5 FL (ref 80–99.9)
MONOCYTES NFR BLD: 0.2 K/UL (ref 0.1–0.95)
MONOCYTES NFR BLD: 6 % (ref 2–12)
MYELOCYTES ABSOLUTE COUNT: 0.06 K/UL
MYELOCYTES: 2 %
NEUTROPHILS NFR BLD: 79 % (ref 43–80)
NEUTS SEG NFR BLD: 2.6 K/UL (ref 1.8–7.3)
PHOSPHATE SERPL-MCNC: 1.7 MG/DL (ref 2.5–4.5)
PHOSPHATE SERPL-MCNC: 1.9 MG/DL (ref 2.5–4.5)
PHOSPHATE SERPL-MCNC: 2.1 MG/DL (ref 2.5–4.5)
PHOSPHATE SERPL-MCNC: 2.2 MG/DL (ref 2.5–4.5)
PLATELET # BLD AUTO: 97 K/UL (ref 130–450)
PLATELET CONFIRMATION: NORMAL
PMV BLD AUTO: 9.5 FL (ref 7–12)
POTASSIUM SERPL-SCNC: 3.6 MMOL/L (ref 3.5–5)
POTASSIUM SERPL-SCNC: 3.9 MMOL/L (ref 3.5–5)
POTASSIUM SERPL-SCNC: 4 MMOL/L (ref 3.5–5)
POTASSIUM SERPL-SCNC: 4.1 MMOL/L (ref 3.5–5)
PROT SERPL-MCNC: 6 G/DL (ref 6.4–8.3)
RBC # BLD AUTO: 2.7 M/UL (ref 3.8–5.8)
RBC # BLD: NORMAL 10*6/UL
SODIUM SERPL-SCNC: 126 MMOL/L (ref 132–146)
SODIUM SERPL-SCNC: 128 MMOL/L (ref 132–146)
SODIUM SERPL-SCNC: 130 MMOL/L (ref 132–146)
SODIUM SERPL-SCNC: 136 MMOL/L (ref 132–146)
WBC OTHER # BLD: 3.3 K/UL (ref 4.5–11.5)

## 2025-02-01 PROCEDURE — 2580000003 HC RX 258: Performed by: INTERNAL MEDICINE

## 2025-02-01 PROCEDURE — 82962 GLUCOSE BLOOD TEST: CPT

## 2025-02-01 PROCEDURE — 6360000002 HC RX W HCPCS: Performed by: INTERNAL MEDICINE

## 2025-02-01 PROCEDURE — 82248 BILIRUBIN DIRECT: CPT

## 2025-02-01 PROCEDURE — 2580000003 HC RX 258

## 2025-02-01 PROCEDURE — 2500000003 HC RX 250 WO HCPCS: Performed by: INTERNAL MEDICINE

## 2025-02-01 PROCEDURE — 85025 COMPLETE CBC W/AUTO DIFF WBC: CPT

## 2025-02-01 PROCEDURE — 6370000000 HC RX 637 (ALT 250 FOR IP): Performed by: INTERNAL MEDICINE

## 2025-02-01 PROCEDURE — 82010 KETONE BODYS QUAN: CPT

## 2025-02-01 PROCEDURE — 83690 ASSAY OF LIPASE: CPT

## 2025-02-01 PROCEDURE — 80048 BASIC METABOLIC PNL TOTAL CA: CPT

## 2025-02-01 PROCEDURE — 80053 COMPREHEN METABOLIC PANEL: CPT

## 2025-02-01 PROCEDURE — 2000000000 HC ICU R&B

## 2025-02-01 PROCEDURE — 99233 SBSQ HOSP IP/OBS HIGH 50: CPT | Performed by: INTERNAL MEDICINE

## 2025-02-01 PROCEDURE — 99291 CRITICAL CARE FIRST HOUR: CPT | Performed by: INTERNAL MEDICINE

## 2025-02-01 PROCEDURE — 84100 ASSAY OF PHOSPHORUS: CPT

## 2025-02-01 PROCEDURE — 83735 ASSAY OF MAGNESIUM: CPT

## 2025-02-01 RX ORDER — THIAMINE HYDROCHLORIDE 100 MG/ML
500 INJECTION, SOLUTION INTRAMUSCULAR; INTRAVENOUS DAILY
Status: DISCONTINUED | OUTPATIENT
Start: 2025-02-02 | End: 2025-02-01 | Stop reason: CLARIF

## 2025-02-01 RX ORDER — LORAZEPAM 2 MG/ML
1 INJECTION INTRAMUSCULAR EVERY 4 HOURS PRN
Status: DISCONTINUED | OUTPATIENT
Start: 2025-02-01 | End: 2025-02-06 | Stop reason: HOSPADM

## 2025-02-01 RX ORDER — MAGNESIUM SULFATE IN WATER 40 MG/ML
4000 INJECTION, SOLUTION INTRAVENOUS ONCE
Status: COMPLETED | OUTPATIENT
Start: 2025-02-01 | End: 2025-02-01

## 2025-02-01 RX ORDER — POTASSIUM CHLORIDE 1500 MG/1
40 TABLET, EXTENDED RELEASE ORAL ONCE
Status: COMPLETED | OUTPATIENT
Start: 2025-02-01 | End: 2025-02-01

## 2025-02-01 RX ORDER — GABAPENTIN 300 MG/1
600 CAPSULE ORAL 3 TIMES DAILY
Status: DISCONTINUED | OUTPATIENT
Start: 2025-02-01 | End: 2025-02-02

## 2025-02-01 RX ORDER — GLUCAGON 1 MG/ML
1 KIT INJECTION PRN
Status: DISCONTINUED | OUTPATIENT
Start: 2025-02-01 | End: 2025-02-06 | Stop reason: HOSPADM

## 2025-02-01 RX ORDER — DEXTROSE MONOHYDRATE AND SODIUM CHLORIDE 5; .45 G/100ML; G/100ML
INJECTION, SOLUTION INTRAVENOUS CONTINUOUS
Status: DISCONTINUED | OUTPATIENT
Start: 2025-02-01 | End: 2025-02-02

## 2025-02-01 RX ORDER — DEXTROSE MONOHYDRATE 100 MG/ML
INJECTION, SOLUTION INTRAVENOUS CONTINUOUS PRN
Status: DISCONTINUED | OUTPATIENT
Start: 2025-02-01 | End: 2025-02-06 | Stop reason: HOSPADM

## 2025-02-01 RX ADMIN — SODIUM CHLORIDE 500 MG: 9 INJECTION, SOLUTION INTRAVENOUS at 21:43

## 2025-02-01 RX ADMIN — POTASSIUM CHLORIDE 40 MEQ: 1500 TABLET, EXTENDED RELEASE ORAL at 14:43

## 2025-02-01 RX ADMIN — POTASSIUM CHLORIDE 10 MEQ: 7.46 INJECTION, SOLUTION INTRAVENOUS at 20:34

## 2025-02-01 RX ADMIN — POTASSIUM CHLORIDE 10 MEQ: 7.46 INJECTION, SOLUTION INTRAVENOUS at 03:04

## 2025-02-01 RX ADMIN — PHENOBARBITAL 97.2 MG: 32.4 TABLET ORAL at 04:47

## 2025-02-01 RX ADMIN — POTASSIUM CHLORIDE 10 MEQ: 7.46 INJECTION, SOLUTION INTRAVENOUS at 05:07

## 2025-02-01 RX ADMIN — Medication 10 ML: at 08:15

## 2025-02-01 RX ADMIN — LORAZEPAM 2 MG: 2 INJECTION INTRAMUSCULAR; INTRAVENOUS at 09:50

## 2025-02-01 RX ADMIN — SODIUM CHLORIDE 40 MG: 9 INJECTION INTRAMUSCULAR; INTRAVENOUS; SUBCUTANEOUS at 18:16

## 2025-02-01 RX ADMIN — POTASSIUM CHLORIDE 10 MEQ: 7.46 INJECTION, SOLUTION INTRAVENOUS at 04:04

## 2025-02-01 RX ADMIN — MAGNESIUM SULFATE HEPTAHYDRATE 4000 MG: 40 INJECTION, SOLUTION INTRAVENOUS at 13:35

## 2025-02-01 RX ADMIN — LORAZEPAM 1 MG: 2 INJECTION INTRAMUSCULAR; INTRAVENOUS at 18:16

## 2025-02-01 RX ADMIN — SODIUM CHLORIDE 40 MG: 9 INJECTION INTRAMUSCULAR; INTRAVENOUS; SUBCUTANEOUS at 05:15

## 2025-02-01 RX ADMIN — GABAPENTIN 600 MG: 300 CAPSULE ORAL at 13:29

## 2025-02-01 RX ADMIN — POTASSIUM CHLORIDE 10 MEQ: 7.46 INJECTION, SOLUTION INTRAVENOUS at 22:51

## 2025-02-01 RX ADMIN — SODIUM CHLORIDE 500 MG: 9 INJECTION, SOLUTION INTRAVENOUS at 15:49

## 2025-02-01 RX ADMIN — GABAPENTIN 600 MG: 300 CAPSULE ORAL at 21:30

## 2025-02-01 RX ADMIN — THIAMINE HYDROCHLORIDE 100 MG: 100 INJECTION, SOLUTION INTRAMUSCULAR; INTRAVENOUS at 08:14

## 2025-02-01 RX ADMIN — POLYETHYLENE GLYCOL 3350 17 G: 17 POWDER, FOR SOLUTION ORAL at 16:25

## 2025-02-01 RX ADMIN — OXYCODONE 5 MG: 5 TABLET ORAL at 16:23

## 2025-02-01 RX ADMIN — DEXTROSE AND SODIUM CHLORIDE: 5; 450 INJECTION, SOLUTION INTRAVENOUS at 17:59

## 2025-02-01 RX ADMIN — FOLIC ACID 1 MG: 1 TABLET ORAL at 08:14

## 2025-02-01 RX ADMIN — PHENOBARBITAL 97.2 MG: 32.4 TABLET ORAL at 08:14

## 2025-02-01 RX ADMIN — ENOXAPARIN SODIUM 40 MG: 100 INJECTION SUBCUTANEOUS at 08:14

## 2025-02-01 RX ADMIN — POTASSIUM CHLORIDE 10 MEQ: 7.46 INJECTION, SOLUTION INTRAVENOUS at 21:42

## 2025-02-01 RX ADMIN — PHENOBARBITAL 64.8 MG: 32.4 TABLET ORAL at 21:33

## 2025-02-01 RX ADMIN — DEXTROSE AND SODIUM CHLORIDE: 5; 900 INJECTION, SOLUTION INTRAVENOUS at 10:52

## 2025-02-01 RX ADMIN — POTASSIUM PHOSPHATE, MONOBASIC POTASSIUM PHOSPHATE, DIBASIC 30 MMOL: 224; 236 INJECTION, SOLUTION, CONCENTRATE INTRAVENOUS at 10:13

## 2025-02-01 RX ADMIN — POTASSIUM PHOSPHATE, MONOBASIC POTASSIUM PHOSPHATE, DIBASIC 30 MMOL: 224; 236 INJECTION, SOLUTION, CONCENTRATE INTRAVENOUS at 16:35

## 2025-02-01 RX ADMIN — DEXTROSE AND SODIUM CHLORIDE: 5; 900 INJECTION, SOLUTION INTRAVENOUS at 04:20

## 2025-02-01 RX ADMIN — POTASSIUM CHLORIDE 40 MEQ: 1500 TABLET, EXTENDED RELEASE ORAL at 17:22

## 2025-02-01 RX ADMIN — Medication 10 ML: at 20:35

## 2025-02-01 RX ADMIN — PHENOBARBITAL 64.8 MG: 32.4 TABLET ORAL at 17:21

## 2025-02-01 RX ADMIN — PHENOBARBITAL 97.2 MG: 32.4 TABLET ORAL at 13:24

## 2025-02-01 RX ADMIN — SODIUM PHOSPHATE, MONOBASIC, MONOHYDRATE AND SODIUM PHOSPHATE, DIBASIC, ANHYDROUS 15 MMOL: 142; 276 INJECTION, SOLUTION INTRAVENOUS at 03:22

## 2025-02-01 RX ADMIN — POTASSIUM CHLORIDE 10 MEQ: 7.46 INJECTION, SOLUTION INTRAVENOUS at 00:48

## 2025-02-01 ASSESSMENT — PAIN SCALES - GENERAL
PAINLEVEL_OUTOF10: 5
PAINLEVEL_OUTOF10: 0
PAINLEVEL_OUTOF10: 0

## 2025-02-01 ASSESSMENT — PAIN DESCRIPTION - DESCRIPTORS: DESCRIPTORS: DISCOMFORT;PRESSURE

## 2025-02-01 ASSESSMENT — PAIN DESCRIPTION - LOCATION: LOCATION: HEAD

## 2025-02-01 NOTE — CONSULTS
Assessment and Plan  Patient is a 54 y.o. male with the following medical Problems:   Alcoholic ketoacidosis (beta-hydroxybutyrate greater than 4.5)  Diabetic ketoacidosis  Alcoholic hepatitis  Alcohol addiction  Pancytopenia due to alcohol abuse.  History of alcohol withdrawal/delirium tremens  Pancreatitis with elevated lipase  Hepatic steatosis, severe  Prostatic enlargement by CT       PLAN:  DKA/AKA protocol  IV Protonix  CIWA protocol  Agree with phenobarbital taper  Scheduled gabapentin, valproic acid, thiamine, folic acid, tapering dose of phenobarbital for alcohol withdrawal  Thiamine, folate daily  Diabetic diet.        History of Present Illness:    The patient is a 54-year-old male admitted here in August 2024 with alcohol withdrawal syndrome, hyponatremia and pancreatitis.     Patient has a long history of extensive alcohol use reaching back \"for years\" usually Powerade mixed with vodka.  At times, he drinks much larger amounts than he does usually.     Over the last several weeks, he has alcohol intake is increased and he came to the emergency room last evening complaining of not being able to stand.  He was noted to be mildly tachypneic with a venous pH of 7.28 and a bicarbonate of 10.  Alcohol level was less than 10.  However, blood glucose was not terribly high and his hemoglobin A1c was 5.9 all of which suggest that there is a component of both alcoholic and possibly diabetic ketoacidosis.  The patient takes metformin for his diabetes and is not known to take SGLT2 inhibitors.     The patient has a long history of depression and uses alcohol to assuage his social issues.     The patient used to be employed at [employer] in [facility] and actually had a successful career.  However, he was offered a buyout and moved back to [place].  He does not recall being employed since.     In the emergency room, liver function tests showed elevations of AST greater than ALT, modest 
[START ON 2/2/2025] PHENobarbital  32.4 mg Oral TID    Followed by    [START ON 2/3/2025] PHENobarbital  16.2 mg Oral BID    sodium chloride flush  5-40 mL IntraVENous 2 times per day    thiamine  100 mg IntraVENous Daily    folic acid  1 mg Oral Daily      sodium chloride      dextrose 5 % and 0.45 % NaCl 150 mL/hr at 01/31/25 1433    insulin 2.2 Units/hr (01/31/25 1718)    sodium chloride      dexmedeTOMIDine HCl in NaCl       dextrose bolus **OR** dextrose bolus, potassium chloride, magnesium sulfate, sodium phosphate 15 mmol in sodium chloride 0.9 % 250 mL IVPB, polyethylene glycol, dextrose 5 % and 0.45 % NaCl, sodium chloride flush, sodium chloride, LORazepam **OR** LORazepam **OR** LORazepam **OR** LORazepam **OR** LORazepam **OR** LORazepam **OR** LORazepam **OR** LORazepam, oxyCODONE **OR** oxyCODONE    REVIEW OF SYSTEMS:  Constitutional: Denies fever, weight loss, night sweats, and fatigue  Skin: Denies pigmentation, dark lesions, and rashes   HEENT: Denies hearing loss, tinnitus, ear drainage, epistaxis, sore throat, and hoarseness.  Cardiovascular: Denies palpitations, chest pain, and chest pressure.  Respiratory: Denies cough, dyspnea at rest, hemoptysis, apnea, and choking.  Gastrointestinal: Positive for nausea, negative for vomiting, poor appetite, diarrhea, heartburn or reflux  Genitourinary: Denies dysuria, frequency, urgency or hematuria  Musculoskeletal: Denies myalgias, muscle weakness, and bone pain  Neurological: Denies dizziness, vertigo, headache, and focal weakness  Psychological: Denies anxiety and depression  Endocrine: Denies heat intolerance and cold intolerance  Hematopoietic/Lymphatic: Denies bleeding problems and blood transfusions    PHYSICAL EXAM:  Vitals:    01/31/25 1720   BP:    Pulse:    Resp: 18   Temp:    SpO2:            O2 Device: None (Room air)    Constitutional: No fever, chills, diaphoresis  Skin: No skin rash, no skin breakdown  HEENT: Unremarkable.  No scleral icterus

## 2025-02-02 LAB
ALBUMIN SERPL-MCNC: 3.4 G/DL (ref 3.5–5.2)
ALP SERPL-CCNC: 84 U/L (ref 40–129)
ALT SERPL-CCNC: 34 U/L (ref 0–40)
ANION GAP SERPL CALCULATED.3IONS-SCNC: 10 MMOL/L (ref 7–16)
ANION GAP SERPL CALCULATED.3IONS-SCNC: 11 MMOL/L (ref 7–16)
ANION GAP SERPL CALCULATED.3IONS-SCNC: 12 MMOL/L (ref 7–16)
AST SERPL-CCNC: 48 U/L (ref 0–39)
B-OH-BUTYR SERPL-MCNC: 0.16 MMOL/L (ref 0.02–0.27)
BASOPHILS # BLD: 0.02 K/UL (ref 0–0.2)
BASOPHILS NFR BLD: 1 % (ref 0–2)
BILIRUB DIRECT SERPL-MCNC: 0.7 MG/DL (ref 0–0.3)
BILIRUB INDIRECT SERPL-MCNC: 0.5 MG/DL (ref 0–1)
BILIRUB SERPL-MCNC: 1.2 MG/DL (ref 0–1.2)
BUN SERPL-MCNC: 7 MG/DL (ref 6–20)
BUN SERPL-MCNC: 8 MG/DL (ref 6–20)
BUN SERPL-MCNC: 9 MG/DL (ref 6–20)
CALCIUM SERPL-MCNC: 7 MG/DL (ref 8.6–10.2)
CALCIUM SERPL-MCNC: 7.1 MG/DL (ref 8.6–10.2)
CALCIUM SERPL-MCNC: 7.2 MG/DL (ref 8.6–10.2)
CHLORIDE SERPL-SCNC: 101 MMOL/L (ref 98–107)
CHLORIDE SERPL-SCNC: 102 MMOL/L (ref 98–107)
CHLORIDE SERPL-SCNC: 104 MMOL/L (ref 98–107)
CO2 SERPL-SCNC: 18 MMOL/L (ref 22–29)
CO2 SERPL-SCNC: 20 MMOL/L (ref 22–29)
CO2 SERPL-SCNC: 20 MMOL/L (ref 22–29)
CREAT SERPL-MCNC: 0.8 MG/DL (ref 0.7–1.2)
EOSINOPHIL # BLD: 0.11 K/UL (ref 0.05–0.5)
EOSINOPHILS RELATIVE PERCENT: 3 % (ref 0–6)
ERYTHROCYTE [DISTWIDTH] IN BLOOD BY AUTOMATED COUNT: 12.2 % (ref 11.5–15)
GFR, ESTIMATED: >90 ML/MIN/1.73M2
GLUCOSE BLD-MCNC: 137 MG/DL (ref 74–99)
GLUCOSE BLD-MCNC: 139 MG/DL (ref 74–99)
GLUCOSE BLD-MCNC: 145 MG/DL (ref 74–99)
GLUCOSE BLD-MCNC: 153 MG/DL (ref 74–99)
GLUCOSE BLD-MCNC: 154 MG/DL (ref 74–99)
GLUCOSE BLD-MCNC: 155 MG/DL (ref 74–99)
GLUCOSE BLD-MCNC: 158 MG/DL (ref 74–99)
GLUCOSE BLD-MCNC: 168 MG/DL (ref 74–99)
GLUCOSE BLD-MCNC: 169 MG/DL (ref 74–99)
GLUCOSE BLD-MCNC: 171 MG/DL (ref 74–99)
GLUCOSE BLD-MCNC: 175 MG/DL (ref 74–99)
GLUCOSE BLD-MCNC: 175 MG/DL (ref 74–99)
GLUCOSE BLD-MCNC: 194 MG/DL (ref 74–99)
GLUCOSE BLD-MCNC: 203 MG/DL (ref 74–99)
GLUCOSE BLD-MCNC: 207 MG/DL (ref 74–99)
GLUCOSE BLD-MCNC: 221 MG/DL (ref 74–99)
GLUCOSE SERPL-MCNC: 145 MG/DL (ref 74–99)
GLUCOSE SERPL-MCNC: 155 MG/DL (ref 74–99)
GLUCOSE SERPL-MCNC: 174 MG/DL (ref 74–99)
HBA1C MFR BLD: 5.7 % (ref 4–5.6)
HCT VFR BLD AUTO: 24.6 % (ref 37–54)
HGB BLD-MCNC: 8.8 G/DL (ref 12.5–16.5)
IMM GRANULOCYTES # BLD AUTO: 0.05 K/UL (ref 0–0.58)
IMM GRANULOCYTES NFR BLD: 1 % (ref 0–5)
LYMPHOCYTES NFR BLD: 0.61 K/UL (ref 1.5–4)
LYMPHOCYTES RELATIVE PERCENT: 17 % (ref 20–42)
MAGNESIUM SERPL-MCNC: 1.7 MG/DL (ref 1.6–2.6)
MAGNESIUM SERPL-MCNC: 1.8 MG/DL (ref 1.6–2.6)
MAGNESIUM SERPL-MCNC: 1.8 MG/DL (ref 1.6–2.6)
MCH RBC QN AUTO: 35.9 PG (ref 26–35)
MCHC RBC AUTO-ENTMCNC: 35.8 G/DL (ref 32–34.5)
MCV RBC AUTO: 100.4 FL (ref 80–99.9)
MICROORGANISM SPEC CULT: NORMAL
MONOCYTES NFR BLD: 0.5 K/UL (ref 0.1–0.95)
MONOCYTES NFR BLD: 14 % (ref 2–12)
NEUTROPHILS NFR BLD: 64 % (ref 43–80)
NEUTS SEG NFR BLD: 2.28 K/UL (ref 1.8–7.3)
PHOSPHATE SERPL-MCNC: 1.6 MG/DL (ref 2.5–4.5)
PHOSPHATE SERPL-MCNC: 1.9 MG/DL (ref 2.5–4.5)
PHOSPHATE SERPL-MCNC: 2.2 MG/DL (ref 2.5–4.5)
PLATELET, FLUORESCENCE: 107 K/UL (ref 130–450)
PMV BLD AUTO: 9.6 FL (ref 7–12)
POTASSIUM SERPL-SCNC: 4 MMOL/L (ref 3.5–5)
POTASSIUM SERPL-SCNC: 4.6 MMOL/L (ref 3.5–5)
POTASSIUM SERPL-SCNC: 4.7 MMOL/L (ref 3.5–5)
PROT SERPL-MCNC: 5.7 G/DL (ref 6.4–8.3)
RBC # BLD AUTO: 2.45 M/UL (ref 3.8–5.8)
SODIUM SERPL-SCNC: 131 MMOL/L (ref 132–146)
SODIUM SERPL-SCNC: 132 MMOL/L (ref 132–146)
SODIUM SERPL-SCNC: 135 MMOL/L (ref 132–146)
SPECIMEN DESCRIPTION: NORMAL
WBC OTHER # BLD: 3.6 K/UL (ref 4.5–11.5)

## 2025-02-02 PROCEDURE — 2580000003 HC RX 258: Performed by: INTERNAL MEDICINE

## 2025-02-02 PROCEDURE — 6360000002 HC RX W HCPCS: Performed by: INTERNAL MEDICINE

## 2025-02-02 PROCEDURE — 83735 ASSAY OF MAGNESIUM: CPT

## 2025-02-02 PROCEDURE — 99233 SBSQ HOSP IP/OBS HIGH 50: CPT | Performed by: INTERNAL MEDICINE

## 2025-02-02 PROCEDURE — 80048 BASIC METABOLIC PNL TOTAL CA: CPT

## 2025-02-02 PROCEDURE — 84100 ASSAY OF PHOSPHORUS: CPT

## 2025-02-02 PROCEDURE — 85025 COMPLETE CBC W/AUTO DIFF WBC: CPT

## 2025-02-02 PROCEDURE — 6370000000 HC RX 637 (ALT 250 FOR IP): Performed by: INTERNAL MEDICINE

## 2025-02-02 PROCEDURE — 2500000003 HC RX 250 WO HCPCS: Performed by: INTERNAL MEDICINE

## 2025-02-02 PROCEDURE — 82010 KETONE BODYS QUAN: CPT

## 2025-02-02 PROCEDURE — 99291 CRITICAL CARE FIRST HOUR: CPT | Performed by: INTERNAL MEDICINE

## 2025-02-02 PROCEDURE — 82962 GLUCOSE BLOOD TEST: CPT

## 2025-02-02 PROCEDURE — 83036 HEMOGLOBIN GLYCOSYLATED A1C: CPT

## 2025-02-02 PROCEDURE — 2000000000 HC ICU R&B

## 2025-02-02 RX ORDER — INSULIN GLARGINE 100 [IU]/ML
18 INJECTION, SOLUTION SUBCUTANEOUS 2 TIMES DAILY
Status: DISCONTINUED | OUTPATIENT
Start: 2025-02-02 | End: 2025-02-02

## 2025-02-02 RX ORDER — INSULIN GLARGINE 100 [IU]/ML
8 INJECTION, SOLUTION SUBCUTANEOUS 2 TIMES DAILY
Status: DISCONTINUED | OUTPATIENT
Start: 2025-02-02 | End: 2025-02-06 | Stop reason: HOSPADM

## 2025-02-02 RX ORDER — PHENOBARBITAL 32.4 MG/1
32.4 TABLET ORAL ONCE
Status: COMPLETED | OUTPATIENT
Start: 2025-02-02 | End: 2025-02-02

## 2025-02-02 RX ORDER — GABAPENTIN 400 MG/1
400 CAPSULE ORAL 3 TIMES DAILY
Status: DISCONTINUED | OUTPATIENT
Start: 2025-02-02 | End: 2025-02-04

## 2025-02-02 RX ORDER — MAGNESIUM SULFATE IN WATER 40 MG/ML
4000 INJECTION, SOLUTION INTRAVENOUS ONCE
Status: COMPLETED | OUTPATIENT
Start: 2025-02-02 | End: 2025-02-02

## 2025-02-02 RX ORDER — INSULIN LISPRO 100 [IU]/ML
0-4 INJECTION, SOLUTION INTRAVENOUS; SUBCUTANEOUS
Status: DISCONTINUED | OUTPATIENT
Start: 2025-02-02 | End: 2025-02-06 | Stop reason: HOSPADM

## 2025-02-02 RX ORDER — INSULIN GLARGINE 100 [IU]/ML
8 INJECTION, SOLUTION SUBCUTANEOUS 2 TIMES DAILY
Status: DISCONTINUED | OUTPATIENT
Start: 2025-02-02 | End: 2025-02-02

## 2025-02-02 RX ORDER — PHENOBARBITAL 32.4 MG/1
16.2 TABLET ORAL 2 TIMES DAILY
Status: COMPLETED | OUTPATIENT
Start: 2025-02-03 | End: 2025-02-03

## 2025-02-02 RX ADMIN — SODIUM PHOSPHATE, MONOBASIC, MONOHYDRATE AND SODIUM PHOSPHATE, DIBASIC, ANHYDROUS 15 MMOL: 142; 276 INJECTION, SOLUTION INTRAVENOUS at 10:45

## 2025-02-02 RX ADMIN — SODIUM CHLORIDE 3.3 UNITS/HR: 9 INJECTION, SOLUTION INTRAVENOUS at 04:43

## 2025-02-02 RX ADMIN — Medication 10 ML: at 09:05

## 2025-02-02 RX ADMIN — FOLIC ACID 1 MG: 1 TABLET ORAL at 08:58

## 2025-02-02 RX ADMIN — POTASSIUM CHLORIDE 10 MEQ: 7.46 INJECTION, SOLUTION INTRAVENOUS at 07:07

## 2025-02-02 RX ADMIN — Medication 10 ML: at 20:35

## 2025-02-02 RX ADMIN — SODIUM CHLORIDE 40 MG: 9 INJECTION INTRAMUSCULAR; INTRAVENOUS; SUBCUTANEOUS at 06:18

## 2025-02-02 RX ADMIN — SODIUM CHLORIDE 500 MG: 9 INJECTION, SOLUTION INTRAVENOUS at 13:58

## 2025-02-02 RX ADMIN — ENOXAPARIN SODIUM 40 MG: 100 INJECTION SUBCUTANEOUS at 08:58

## 2025-02-02 RX ADMIN — OXYCODONE 5 MG: 5 TABLET ORAL at 20:46

## 2025-02-02 RX ADMIN — LORAZEPAM 1 MG: 2 INJECTION INTRAMUSCULAR; INTRAVENOUS at 00:12

## 2025-02-02 RX ADMIN — SODIUM CHLORIDE 500 MG: 9 INJECTION, SOLUTION INTRAVENOUS at 06:17

## 2025-02-02 RX ADMIN — SODIUM CHLORIDE 40 MG: 9 INJECTION INTRAMUSCULAR; INTRAVENOUS; SUBCUTANEOUS at 17:53

## 2025-02-02 RX ADMIN — DEXTROSE AND SODIUM CHLORIDE: 5; 450 INJECTION, SOLUTION INTRAVENOUS at 00:55

## 2025-02-02 RX ADMIN — POTASSIUM CHLORIDE 10 MEQ: 7.46 INJECTION, SOLUTION INTRAVENOUS at 01:20

## 2025-02-02 RX ADMIN — MAGNESIUM SULFATE HEPTAHYDRATE 4000 MG: 40 INJECTION, SOLUTION INTRAVENOUS at 14:38

## 2025-02-02 RX ADMIN — POTASSIUM CHLORIDE 10 MEQ: 7.46 INJECTION, SOLUTION INTRAVENOUS at 10:14

## 2025-02-02 RX ADMIN — POTASSIUM CHLORIDE 10 MEQ: 7.46 INJECTION, SOLUTION INTRAVENOUS at 02:23

## 2025-02-02 RX ADMIN — DEXTROSE AND SODIUM CHLORIDE: 5; 450 INJECTION, SOLUTION INTRAVENOUS at 07:40

## 2025-02-02 RX ADMIN — INSULIN LISPRO 1 UNITS: 100 INJECTION, SOLUTION INTRAVENOUS; SUBCUTANEOUS at 20:34

## 2025-02-02 RX ADMIN — POTASSIUM CHLORIDE 10 MEQ: 7.46 INJECTION, SOLUTION INTRAVENOUS at 11:23

## 2025-02-02 RX ADMIN — POLYETHYLENE GLYCOL 3350 17 G: 17 POWDER, FOR SOLUTION ORAL at 14:46

## 2025-02-02 RX ADMIN — PHENOBARBITAL 64.8 MG: 32.4 TABLET ORAL at 06:18

## 2025-02-02 RX ADMIN — POTASSIUM CHLORIDE 10 MEQ: 7.46 INJECTION, SOLUTION INTRAVENOUS at 06:16

## 2025-02-02 RX ADMIN — INSULIN GLARGINE 8 UNITS: 100 INJECTION, SOLUTION SUBCUTANEOUS at 13:59

## 2025-02-02 RX ADMIN — POTASSIUM CHLORIDE 10 MEQ: 7.46 INJECTION, SOLUTION INTRAVENOUS at 08:00

## 2025-02-02 RX ADMIN — PHENOBARBITAL 64.8 MG: 32.4 TABLET ORAL at 08:58

## 2025-02-02 RX ADMIN — THIAMINE HYDROCHLORIDE 500 MG: 100 INJECTION, SOLUTION INTRAMUSCULAR; INTRAVENOUS at 09:55

## 2025-02-02 RX ADMIN — SODIUM PHOSPHATE, MONOBASIC, MONOHYDRATE AND SODIUM PHOSPHATE, DIBASIC, ANHYDROUS 15 MMOL: 142; 276 INJECTION, SOLUTION INTRAVENOUS at 01:37

## 2025-02-02 RX ADMIN — GABAPENTIN 600 MG: 300 CAPSULE ORAL at 08:58

## 2025-02-02 RX ADMIN — SODIUM CHLORIDE 500 MG: 9 INJECTION, SOLUTION INTRAVENOUS at 20:37

## 2025-02-02 RX ADMIN — SODIUM PHOSPHATE, MONOBASIC, MONOHYDRATE AND SODIUM PHOSPHATE, DIBASIC, ANHYDROUS 15 MMOL: 142; 276 INJECTION, SOLUTION INTRAVENOUS at 06:17

## 2025-02-02 RX ADMIN — PHENOBARBITAL 32.4 MG: 32.4 TABLET ORAL at 20:27

## 2025-02-02 RX ADMIN — GABAPENTIN 400 MG: 400 CAPSULE ORAL at 20:27

## 2025-02-02 RX ADMIN — GABAPENTIN 400 MG: 400 CAPSULE ORAL at 13:59

## 2025-02-02 RX ADMIN — INSULIN GLARGINE 8 UNITS: 100 INJECTION, SOLUTION SUBCUTANEOUS at 20:35

## 2025-02-02 ASSESSMENT — PAIN SCALES - GENERAL
PAINLEVEL_OUTOF10: 0
PAINLEVEL_OUTOF10: 4
PAINLEVEL_OUTOF10: 1
PAINLEVEL_OUTOF10: 0

## 2025-02-02 ASSESSMENT — PAIN DESCRIPTION - DESCRIPTORS: DESCRIPTORS: ACHING;DISCOMFORT;DULL

## 2025-02-02 ASSESSMENT — PAIN DESCRIPTION - LOCATION: LOCATION: HEAD

## 2025-02-02 NOTE — PLAN OF CARE
Problem: Chronic Conditions and Co-morbidities  Goal: Patient's chronic conditions and co-morbidity symptoms are monitored and maintained or improved  2/2/2025 0708 by Maddie Hou RN  Outcome: Progressing  2/1/2025 1855 by Nancy Baron RN  Outcome: Progressing     Problem: Safety - Adult  Goal: Free from fall injury  2/2/2025 0708 by Maddie Hou RN  Outcome: Progressing  2/1/2025 1855 by Nancy Baron RN  Outcome: Progressing     Problem: Skin/Tissue Integrity  Goal: Skin integrity remains intact  Description: 1.  Monitor for areas of redness and/or skin breakdown  2.  Assess vascular access sites hourly  3.  Every 4-6 hours minimum:  Change oxygen saturation probe site  4.  Every 4-6 hours:  If on nasal continuous positive airway pressure, respiratory therapy assess nares and determine need for appliance change or resting period  Outcome: Progressing

## 2025-02-03 PROBLEM — K75.9 HEPATITIS: Status: ACTIVE | Noted: 2025-02-03

## 2025-02-03 PROBLEM — R17 ELEVATED BILIRUBIN: Status: ACTIVE | Noted: 2025-02-03

## 2025-02-03 LAB
ALBUMIN SERPL-MCNC: 3.2 G/DL (ref 3.5–5.2)
ALP SERPL-CCNC: 79 U/L (ref 40–129)
ALT SERPL-CCNC: 29 U/L (ref 0–40)
ANION GAP SERPL CALCULATED.3IONS-SCNC: 10 MMOL/L (ref 7–16)
AST SERPL-CCNC: 46 U/L (ref 0–39)
B-OH-BUTYR SERPL-MCNC: 0.13 MMOL/L (ref 0.02–0.27)
BASOPHILS # BLD: 0.03 K/UL (ref 0–0.2)
BASOPHILS NFR BLD: 1 % (ref 0–2)
BILIRUB DIRECT SERPL-MCNC: 0.5 MG/DL (ref 0–0.3)
BILIRUB INDIRECT SERPL-MCNC: 0.3 MG/DL (ref 0–1)
BILIRUB SERPL-MCNC: 0.8 MG/DL (ref 0–1.2)
BUN SERPL-MCNC: 4 MG/DL (ref 6–20)
CALCIUM SERPL-MCNC: 7 MG/DL (ref 8.6–10.2)
CHLORIDE SERPL-SCNC: 104 MMOL/L (ref 98–107)
CO2 SERPL-SCNC: 22 MMOL/L (ref 22–29)
CREAT SERPL-MCNC: 0.7 MG/DL (ref 0.7–1.2)
EOSINOPHIL # BLD: 0.12 K/UL (ref 0.05–0.5)
EOSINOPHILS RELATIVE PERCENT: 3 % (ref 0–6)
ERYTHROCYTE [DISTWIDTH] IN BLOOD BY AUTOMATED COUNT: 12.4 % (ref 11.5–15)
GFR, ESTIMATED: >90 ML/MIN/1.73M2
GLUCOSE BLD-MCNC: 139 MG/DL (ref 74–99)
GLUCOSE BLD-MCNC: 162 MG/DL (ref 74–99)
GLUCOSE BLD-MCNC: 192 MG/DL (ref 74–99)
GLUCOSE BLD-MCNC: 202 MG/DL (ref 74–99)
GLUCOSE SERPL-MCNC: 158 MG/DL (ref 74–99)
HCT VFR BLD AUTO: 25.4 % (ref 37–54)
HGB BLD-MCNC: 8.9 G/DL (ref 12.5–16.5)
HIV 1+2 AB+HIV1 P24 AG SERPL QL IA: NONREACTIVE
IMM GRANULOCYTES # BLD AUTO: 0.09 K/UL (ref 0–0.58)
IMM GRANULOCYTES NFR BLD: 3 % (ref 0–5)
LYMPHOCYTES NFR BLD: 0.64 K/UL (ref 1.5–4)
LYMPHOCYTES RELATIVE PERCENT: 18 % (ref 20–42)
MAGNESIUM SERPL-MCNC: 2.2 MG/DL (ref 1.6–2.6)
MCH RBC QN AUTO: 35.6 PG (ref 26–35)
MCHC RBC AUTO-ENTMCNC: 35 G/DL (ref 32–34.5)
MCV RBC AUTO: 101.6 FL (ref 80–99.9)
MONOCYTES NFR BLD: 0.51 K/UL (ref 0.1–0.95)
MONOCYTES NFR BLD: 14 % (ref 2–12)
NEUTROPHILS NFR BLD: 61 % (ref 43–80)
NEUTS SEG NFR BLD: 2.21 K/UL (ref 1.8–7.3)
PHOSPHATE SERPL-MCNC: 2 MG/DL (ref 2.5–4.5)
PLATELET, FLUORESCENCE: 113 K/UL (ref 130–450)
PMV BLD AUTO: 11.1 FL (ref 7–12)
POTASSIUM SERPL-SCNC: 4.1 MMOL/L (ref 3.5–5)
PROT SERPL-MCNC: 5.5 G/DL (ref 6.4–8.3)
RBC # BLD AUTO: 2.5 M/UL (ref 3.8–5.8)
SODIUM SERPL-SCNC: 136 MMOL/L (ref 132–146)
WBC OTHER # BLD: 3.6 K/UL (ref 4.5–11.5)

## 2025-02-03 PROCEDURE — 97161 PT EVAL LOW COMPLEX 20 MIN: CPT | Performed by: PHYSICAL THERAPIST

## 2025-02-03 PROCEDURE — 85025 COMPLETE CBC W/AUTO DIFF WBC: CPT

## 2025-02-03 PROCEDURE — 6360000002 HC RX W HCPCS: Performed by: INTERNAL MEDICINE

## 2025-02-03 PROCEDURE — 2580000003 HC RX 258: Performed by: INTERNAL MEDICINE

## 2025-02-03 PROCEDURE — 97535 SELF CARE MNGMENT TRAINING: CPT

## 2025-02-03 PROCEDURE — 6370000000 HC RX 637 (ALT 250 FOR IP): Performed by: INTERNAL MEDICINE

## 2025-02-03 PROCEDURE — 97165 OT EVAL LOW COMPLEX 30 MIN: CPT

## 2025-02-03 PROCEDURE — 2500000003 HC RX 250 WO HCPCS: Performed by: INTERNAL MEDICINE

## 2025-02-03 PROCEDURE — 82248 BILIRUBIN DIRECT: CPT

## 2025-02-03 PROCEDURE — 80053 COMPREHEN METABOLIC PANEL: CPT

## 2025-02-03 PROCEDURE — 97530 THERAPEUTIC ACTIVITIES: CPT | Performed by: PHYSICAL THERAPIST

## 2025-02-03 PROCEDURE — 99291 CRITICAL CARE FIRST HOUR: CPT | Performed by: INTERNAL MEDICINE

## 2025-02-03 PROCEDURE — 2060000000 HC ICU INTERMEDIATE R&B

## 2025-02-03 PROCEDURE — 82010 KETONE BODYS QUAN: CPT

## 2025-02-03 PROCEDURE — 83735 ASSAY OF MAGNESIUM: CPT

## 2025-02-03 PROCEDURE — 99232 SBSQ HOSP IP/OBS MODERATE 35: CPT | Performed by: FAMILY MEDICINE

## 2025-02-03 PROCEDURE — 82962 GLUCOSE BLOOD TEST: CPT

## 2025-02-03 PROCEDURE — 84100 ASSAY OF PHOSPHORUS: CPT

## 2025-02-03 RX ORDER — BLOOD-GLUCOSE METER
1 KIT MISCELLANEOUS DAILY
Qty: 1 EACH | Refills: 0 | Status: SHIPPED | OUTPATIENT
Start: 2025-02-03

## 2025-02-03 RX ORDER — POLYETHYLENE GLYCOL 3350 17 G/17G
17 POWDER, FOR SOLUTION ORAL DAILY
Status: DISCONTINUED | OUTPATIENT
Start: 2025-02-03 | End: 2025-02-06 | Stop reason: HOSPADM

## 2025-02-03 RX ORDER — VALPROIC ACID 250 MG/1
250 CAPSULE ORAL 3 TIMES DAILY
Status: DISCONTINUED | OUTPATIENT
Start: 2025-02-03 | End: 2025-02-04

## 2025-02-03 RX ORDER — GAUZE BANDAGE 2" X 2"
100 BANDAGE TOPICAL DAILY
Status: DISCONTINUED | OUTPATIENT
Start: 2025-02-03 | End: 2025-02-06 | Stop reason: HOSPADM

## 2025-02-03 RX ORDER — PANTOPRAZOLE SODIUM 40 MG/1
40 TABLET, DELAYED RELEASE ORAL
Status: DISCONTINUED | OUTPATIENT
Start: 2025-02-04 | End: 2025-02-06 | Stop reason: HOSPADM

## 2025-02-03 RX ORDER — LANCETS 28 GAUGE
1 EACH MISCELLANEOUS DAILY
Qty: 100 EACH | Refills: 3 | Status: SHIPPED | OUTPATIENT
Start: 2025-02-03

## 2025-02-03 RX ORDER — BISACODYL 10 MG
10 SUPPOSITORY, RECTAL RECTAL ONCE
Status: COMPLETED | OUTPATIENT
Start: 2025-02-03 | End: 2025-02-03

## 2025-02-03 RX ORDER — DOCUSATE SODIUM 100 MG/1
100 CAPSULE, LIQUID FILLED ORAL 2 TIMES DAILY
Status: DISCONTINUED | OUTPATIENT
Start: 2025-02-03 | End: 2025-02-06 | Stop reason: HOSPADM

## 2025-02-03 RX ORDER — BLOOD-GLUCOSE METER
1 KIT MISCELLANEOUS DAILY
Qty: 100 EACH | Refills: 3 | Status: SHIPPED | OUTPATIENT
Start: 2025-02-03

## 2025-02-03 RX ADMIN — POLYETHYLENE GLYCOL 3350 17 G: 17 POWDER, FOR SOLUTION ORAL at 12:53

## 2025-02-03 RX ADMIN — GABAPENTIN 400 MG: 400 CAPSULE ORAL at 21:38

## 2025-02-03 RX ADMIN — PHENOBARBITAL 16.2 MG: 32.4 TABLET ORAL at 21:38

## 2025-02-03 RX ADMIN — INSULIN LISPRO 1 UNITS: 100 INJECTION, SOLUTION INTRAVENOUS; SUBCUTANEOUS at 11:32

## 2025-02-03 RX ADMIN — VALPROIC ACID 250 MG: 250 CAPSULE ORAL at 14:11

## 2025-02-03 RX ADMIN — SODIUM CHLORIDE 40 MG: 9 INJECTION INTRAMUSCULAR; INTRAVENOUS; SUBCUTANEOUS at 06:26

## 2025-02-03 RX ADMIN — Medication 10 ML: at 21:45

## 2025-02-03 RX ADMIN — LORAZEPAM 1 MG: 2 INJECTION INTRAMUSCULAR; INTRAVENOUS at 17:13

## 2025-02-03 RX ADMIN — FOLIC ACID 1 MG: 1 TABLET ORAL at 08:19

## 2025-02-03 RX ADMIN — INSULIN GLARGINE 8 UNITS: 100 INJECTION, SOLUTION SUBCUTANEOUS at 08:20

## 2025-02-03 RX ADMIN — Medication 10 ML: at 08:20

## 2025-02-03 RX ADMIN — DOCUSATE SODIUM 100 MG: 100 CAPSULE, LIQUID FILLED ORAL at 12:53

## 2025-02-03 RX ADMIN — DOCUSATE SODIUM 100 MG: 100 CAPSULE, LIQUID FILLED ORAL at 21:40

## 2025-02-03 RX ADMIN — SODIUM PHOSPHATE, MONOBASIC, MONOHYDRATE AND SODIUM PHOSPHATE, DIBASIC, ANHYDROUS 15 MMOL: 142; 276 INJECTION, SOLUTION INTRAVENOUS at 07:31

## 2025-02-03 RX ADMIN — LORAZEPAM 1 MG: 2 INJECTION INTRAMUSCULAR; INTRAVENOUS at 00:48

## 2025-02-03 RX ADMIN — THIAMINE HYDROCHLORIDE 500 MG: 100 INJECTION, SOLUTION INTRAMUSCULAR; INTRAVENOUS at 09:56

## 2025-02-03 RX ADMIN — VALPROIC ACID 250 MG: 250 CAPSULE ORAL at 22:05

## 2025-02-03 RX ADMIN — INSULIN GLARGINE 8 UNITS: 100 INJECTION, SOLUTION SUBCUTANEOUS at 21:41

## 2025-02-03 RX ADMIN — ENOXAPARIN SODIUM 40 MG: 100 INJECTION SUBCUTANEOUS at 08:17

## 2025-02-03 RX ADMIN — BISACODYL 10 MG: 10 SUPPOSITORY RECTAL at 12:53

## 2025-02-03 RX ADMIN — GABAPENTIN 400 MG: 400 CAPSULE ORAL at 14:11

## 2025-02-03 RX ADMIN — PHENOBARBITAL 16.2 MG: 32.4 TABLET ORAL at 08:19

## 2025-02-03 RX ADMIN — INSULIN LISPRO 1 UNITS: 100 INJECTION, SOLUTION INTRAVENOUS; SUBCUTANEOUS at 21:43

## 2025-02-03 RX ADMIN — SODIUM CHLORIDE 500 MG: 9 INJECTION, SOLUTION INTRAVENOUS at 05:15

## 2025-02-03 RX ADMIN — GABAPENTIN 400 MG: 400 CAPSULE ORAL at 08:20

## 2025-02-03 ASSESSMENT — PAIN - FUNCTIONAL ASSESSMENT: PAIN_FUNCTIONAL_ASSESSMENT: ACTIVITIES ARE NOT PREVENTED

## 2025-02-03 ASSESSMENT — PAIN DESCRIPTION - ORIENTATION: ORIENTATION: RIGHT

## 2025-02-03 ASSESSMENT — PAIN SCALES - GENERAL: PAINLEVEL_OUTOF10: 1

## 2025-02-03 ASSESSMENT — PAIN DESCRIPTION - DESCRIPTORS: DESCRIPTORS: ACHING

## 2025-02-03 ASSESSMENT — PAIN DESCRIPTION - LOCATION: LOCATION: ABDOMEN

## 2025-02-03 ASSESSMENT — PAIN DESCRIPTION - PAIN TYPE: TYPE: ACUTE PAIN

## 2025-02-03 NOTE — CARE COORDINATION
Advance Care Planning   Healthcare Decision Maker:    Primary Decision Maker: Racquel Bernard - Brother/Sister - [phone]    Primary Decision Maker: Beatriz Bridges - Brother/Sister - [phone]    Today we documented Decision Maker(s) consistent with Legal Next of Kin hierarchy.

## 2025-02-03 NOTE — CARE COORDINATION
Case Management Assessment  Initial Evaluation    Date/Time of Evaluation: 2/3/2025 3:03 PM  Assessment Completed by: Liv Cruz RN    If patient is discharged prior to next notation, then this note serves as note for discharge by case management.    Patient Name: Jose Wesley                   YOB: 1970  Diagnosis: Hepatitis [K75.9]  Alcoholic ketosis (HCC) [E88.89]  Diabetic ketoacidosis without coma associated with type 2 diabetes mellitus (HCC) [E11.10]                   Date / Time: 1/31/2025  8:19 AM    Patient Admission Status: Inpatient   Readmission Risk (Low < 19, Mod (19-27), High > 27): Readmission Risk Score: 12.8    Current PCP: Alena Guerrero MD  PCP verified by CM? Yes    Chart Reviewed: Yes      History Provided by: Patient  Patient Orientation: Alert and Oriented    Patient Cognition: Alert    Hospitalization in the last 30 days (Readmission):  No    If yes, Readmission Assessment in CM Navigator will be completed.    Advance Directives:      Code Status: Full Code   Patient's Primary Decision Maker is: Legal Next of Kin    Primary Decision Maker: Racquel Bernard - Brother/Sister - [phone]    Primary Decision Maker: Beatriz Bridges - Brother/Sister - [phone]    Discharge Planning:    Patient lives with: Alone Type of Home: Other (Comment) ([facility])  Primary Care Giver: Self  Patient Support Systems include: Family Members     ADLS  Prior functional level: Independent in ADLs/IADLs  Current functional level: Independent in ADLs/IADLs      Family can provide assistance at DC: Yes  Would you like Case Management to discuss the discharge plan with any other family members/significant others, and if so, who? No  Plans to Return to Present Housing: Yes  Other Identified Issues/Barriers to RETURNING to current housing: none     Financial    Payor: [employer] [place] / Plan: [employer] [place] / Product Type: *No Product type* /         [facility] #[id number] - JOAN

## 2025-02-04 LAB
ANION GAP SERPL CALCULATED.3IONS-SCNC: 11 MMOL/L (ref 7–16)
BASOPHILS # BLD: 0.05 K/UL (ref 0–0.2)
BASOPHILS NFR BLD: 1 % (ref 0–2)
BUN SERPL-MCNC: 5 MG/DL (ref 6–20)
CALCIUM SERPL-MCNC: 7.6 MG/DL (ref 8.6–10.2)
CHLORIDE SERPL-SCNC: 100 MMOL/L (ref 98–107)
CO2 SERPL-SCNC: 23 MMOL/L (ref 22–29)
CREAT SERPL-MCNC: 0.7 MG/DL (ref 0.7–1.2)
EOSINOPHIL # BLD: 0.08 K/UL (ref 0.05–0.5)
EOSINOPHILS RELATIVE PERCENT: 2 % (ref 0–6)
ERYTHROCYTE [DISTWIDTH] IN BLOOD BY AUTOMATED COUNT: 12.7 % (ref 11.5–15)
GFR, ESTIMATED: >90 ML/MIN/1.73M2
GLUCOSE BLD-MCNC: 151 MG/DL (ref 74–99)
GLUCOSE BLD-MCNC: 156 MG/DL (ref 74–99)
GLUCOSE BLD-MCNC: 168 MG/DL (ref 74–99)
GLUCOSE BLD-MCNC: 207 MG/DL (ref 74–99)
GLUCOSE SERPL-MCNC: 154 MG/DL (ref 74–99)
HCT VFR BLD AUTO: 28.3 % (ref 37–54)
HGB BLD-MCNC: 9.5 G/DL (ref 12.5–16.5)
IMM GRANULOCYTES # BLD AUTO: 0.1 K/UL (ref 0–0.58)
IMM GRANULOCYTES NFR BLD: 2 % (ref 0–5)
LYMPHOCYTES NFR BLD: 0.92 K/UL (ref 1.5–4)
LYMPHOCYTES RELATIVE PERCENT: 20 % (ref 20–42)
MAGNESIUM SERPL-MCNC: 1.9 MG/DL (ref 1.6–2.6)
MCH RBC QN AUTO: 34.5 PG (ref 26–35)
MCHC RBC AUTO-ENTMCNC: 33.6 G/DL (ref 32–34.5)
MCV RBC AUTO: 102.9 FL (ref 80–99.9)
MONOCYTES NFR BLD: 0.73 K/UL (ref 0.1–0.95)
MONOCYTES NFR BLD: 16 % (ref 2–12)
NEUTROPHILS NFR BLD: 59 % (ref 43–80)
NEUTS SEG NFR BLD: 2.68 K/UL (ref 1.8–7.3)
PHOSPHATE SERPL-MCNC: 1.8 MG/DL (ref 2.5–4.5)
PLATELET # BLD AUTO: 142 K/UL (ref 130–450)
PMV BLD AUTO: 10.9 FL (ref 7–12)
POTASSIUM SERPL-SCNC: 4.1 MMOL/L (ref 3.5–5)
RBC # BLD AUTO: 2.75 M/UL (ref 3.8–5.8)
SODIUM SERPL-SCNC: 134 MMOL/L (ref 132–146)
WBC OTHER # BLD: 4.6 K/UL (ref 4.5–11.5)

## 2025-02-04 PROCEDURE — 97530 THERAPEUTIC ACTIVITIES: CPT

## 2025-02-04 PROCEDURE — 83735 ASSAY OF MAGNESIUM: CPT

## 2025-02-04 PROCEDURE — 2060000000 HC ICU INTERMEDIATE R&B

## 2025-02-04 PROCEDURE — 2500000003 HC RX 250 WO HCPCS: Performed by: INTERNAL MEDICINE

## 2025-02-04 PROCEDURE — 97535 SELF CARE MNGMENT TRAINING: CPT

## 2025-02-04 PROCEDURE — 85025 COMPLETE CBC W/AUTO DIFF WBC: CPT

## 2025-02-04 PROCEDURE — 6370000000 HC RX 637 (ALT 250 FOR IP): Performed by: FAMILY MEDICINE

## 2025-02-04 PROCEDURE — 6360000002 HC RX W HCPCS: Performed by: FAMILY MEDICINE

## 2025-02-04 PROCEDURE — 99232 SBSQ HOSP IP/OBS MODERATE 35: CPT | Performed by: INTERNAL MEDICINE

## 2025-02-04 PROCEDURE — 82962 GLUCOSE BLOOD TEST: CPT

## 2025-02-04 PROCEDURE — 6370000000 HC RX 637 (ALT 250 FOR IP): Performed by: INTERNAL MEDICINE

## 2025-02-04 PROCEDURE — 36415 COLL VENOUS BLD VENIPUNCTURE: CPT

## 2025-02-04 PROCEDURE — 84100 ASSAY OF PHOSPHORUS: CPT

## 2025-02-04 PROCEDURE — 99232 SBSQ HOSP IP/OBS MODERATE 35: CPT | Performed by: FAMILY MEDICINE

## 2025-02-04 PROCEDURE — 80048 BASIC METABOLIC PNL TOTAL CA: CPT

## 2025-02-04 PROCEDURE — 6360000002 HC RX W HCPCS: Performed by: INTERNAL MEDICINE

## 2025-02-04 RX ORDER — GABAPENTIN 300 MG/1
300 CAPSULE ORAL 3 TIMES DAILY
Status: DISCONTINUED | OUTPATIENT
Start: 2025-02-04 | End: 2025-02-06

## 2025-02-04 RX ORDER — VALPROIC ACID 250 MG/1
250 CAPSULE ORAL 2 TIMES DAILY
Status: DISCONTINUED | OUTPATIENT
Start: 2025-02-04 | End: 2025-02-06

## 2025-02-04 RX ORDER — MAGNESIUM SULFATE 1 G/100ML
1000 INJECTION INTRAVENOUS ONCE
Status: COMPLETED | OUTPATIENT
Start: 2025-02-04 | End: 2025-02-04

## 2025-02-04 RX ADMIN — INSULIN GLARGINE 8 UNITS: 100 INJECTION, SOLUTION SUBCUTANEOUS at 07:50

## 2025-02-04 RX ADMIN — INSULIN GLARGINE 8 UNITS: 100 INJECTION, SOLUTION SUBCUTANEOUS at 21:00

## 2025-02-04 RX ADMIN — VALPROIC ACID 250 MG: 250 CAPSULE ORAL at 07:52

## 2025-02-04 RX ADMIN — GABAPENTIN 400 MG: 400 CAPSULE ORAL at 07:43

## 2025-02-04 RX ADMIN — Medication 10 ML: at 20:33

## 2025-02-04 RX ADMIN — ENOXAPARIN SODIUM 40 MG: 100 INJECTION SUBCUTANEOUS at 07:43

## 2025-02-04 RX ADMIN — INSULIN LISPRO 1 UNITS: 100 INJECTION, SOLUTION INTRAVENOUS; SUBCUTANEOUS at 17:07

## 2025-02-04 RX ADMIN — DOCUSATE SODIUM 100 MG: 100 CAPSULE, LIQUID FILLED ORAL at 20:31

## 2025-02-04 RX ADMIN — GABAPENTIN 300 MG: 300 CAPSULE ORAL at 20:32

## 2025-02-04 RX ADMIN — VALPROIC ACID 250 MG: 250 CAPSULE ORAL at 20:31

## 2025-02-04 RX ADMIN — DOCUSATE SODIUM 100 MG: 100 CAPSULE, LIQUID FILLED ORAL at 07:43

## 2025-02-04 RX ADMIN — POLYETHYLENE GLYCOL 3350 17 G: 17 POWDER, FOR SOLUTION ORAL at 07:44

## 2025-02-04 RX ADMIN — MAGNESIUM SULFATE 1000 MG: 1 INJECTION INTRAVENOUS at 12:17

## 2025-02-04 RX ADMIN — Medication 100 MG: at 07:43

## 2025-02-04 RX ADMIN — GABAPENTIN 400 MG: 400 CAPSULE ORAL at 15:11

## 2025-02-04 RX ADMIN — FOLIC ACID 1 MG: 1 TABLET ORAL at 07:43

## 2025-02-04 RX ADMIN — VALPROIC ACID 250 MG: 250 CAPSULE ORAL at 15:11

## 2025-02-04 RX ADMIN — DIBASIC SODIUM PHOSPHATE, MONOBASIC POTASSIUM PHOSPHATE AND MONOBASIC SODIUM PHOSPHATE 1 TABLET: 852; 155; 130 TABLET ORAL at 12:14

## 2025-02-04 RX ADMIN — Medication 10 ML: at 07:50

## 2025-02-04 RX ADMIN — PANTOPRAZOLE SODIUM 40 MG: 40 TABLET, DELAYED RELEASE ORAL at 05:29

## 2025-02-04 RX ADMIN — DIBASIC SODIUM PHOSPHATE, MONOBASIC POTASSIUM PHOSPHATE AND MONOBASIC SODIUM PHOSPHATE 1 TABLET: 852; 155; 130 TABLET ORAL at 20:31

## 2025-02-04 NOTE — CARE COORDINATION
2/4/25 1032 CM note: respir panel (-) 1/31/25. Room air. Tx from ICU 2/3/25. Hx ETOH abuse. Per notes, pt admits he needs help, he is agreeable to meet with PRS, referral called. Cuong, Ant [place], here to see pt today; however d/t pts current mobility he would not be appropriate for outpt ETOH rehab at this time but requests call if his mobility improves. Met with patient at the bedside to discuss therapies recommendation for SNF placement. Pt is declining SNF at this time. Discharge plan is home. Pt states his sister lives @ 10 minutes from him and can assist as needed. He is agreeable to [facility]- no preference to agency. Refer made to [facility]- awaiting their response. Pt lives alone in 3 story home, [address], @ 12 steps to the 2nd floor where bed/bath is located. NO DME. Pt states he drinks 4 glasses of vodka per day, hx outpt ETOH rehab in [place]-he can not recall name of facility, he has done AA in the past-pt states it did not work for him, hx counseling but last seen @ 3 yrs ago. Pt agreeable to outpt rehab/counseling lists-provided, he declines speaking with Peer Recovery at this time. Pt states he  will need transport home through his insurance. CM will follow. Electronically signed by Alma Espinosa RN on 2/4/2025 at 1:05 PM     Update: 2/4/25 1546 Per Liza Ball [facility], they can accept for SOC on 2/7/25. WILL NEED [facility] ORDERS for nursing, PT/OT, aide, SW. Requested therapy see pt in am to see if his mobility has improved. Electronically signed by Alma Espinosa RN on 2/4/2025 at 3:48 PM     Update: 2/4/25 1630 Pts sister at the bedside and requesting to speak with CM. Met with pt and his sister at the bedside and pt is now agreeable to SNF and provided the following choices: 1) [facility]  2) [facility]- referral given to Sulema for review, notified her pt is ready to start precert if she can accept. Electronically signed by Alma Espinosa RN on 2/4/2025 at 4:32 PM

## 2025-02-04 NOTE — PLAN OF CARE
Problem: Chronic Conditions and Co-morbidities  Goal: Patient's chronic conditions and co-morbidity symptoms are monitored and maintained or improved  2/4/2025 0651 by Miladys Bahena RN  Outcome: Progressing  2/4/2025 0642 by Alex Arzola RN  Outcome: Progressing  Flowsheets (Taken 2/3/2025 2007)  Care Plan - Patient's Chronic Conditions and Co-Morbidity Symptoms are Monitored and Maintained or Improved: Monitor and assess patient's chronic conditions and comorbid symptoms for stability, deterioration, or improvement     Problem: Discharge Planning  Goal: Discharge to home or other facility with appropriate resources  2/4/2025 0651 by Miladys Bahena RN  Outcome: Progressing  2/4/2025 0642 by Alex Arzola RN  Outcome: Progressing  Flowsheets (Taken 2/3/2025 2007)  Discharge to home or other facility with appropriate resources: Identify barriers to discharge with patient and caregiver     Problem: Safety - Adult  Goal: Free from fall injury  2/4/2025 0651 by Miladys Bahena RN  Outcome: Progressing  2/4/2025 0642 by Alex Arzola RN  Outcome: Progressing  Flowsheets (Taken 2/3/2025 2248)  Free From Fall Injury: Instruct family/caregiver on patient safety     Problem: Skin/Tissue Integrity  Goal: Skin integrity remains intact  Description: 1.  Monitor for areas of redness and/or skin breakdown  2.  Assess vascular access sites hourly  3.  Every 4-6 hours minimum:  Change oxygen saturation probe site  4.  Every 4-6 hours:  If on nasal continuous positive airway pressure, respiratory therapy assess nares and determine need for appliance change or resting period  2/4/2025 0651 by Miladys Bahena RN  Outcome: Progressing  2/4/2025 0642 by Alex Arzola RN  Outcome: Progressing  Flowsheets  Taken 2/3/2025 2248  Skin Integrity Remains Intact: Monitor for areas of redness and/or skin breakdown  Taken 2/3/2025 2007  Skin Integrity Remains Intact: Monitor for areas of redness and/or skin

## 2025-02-04 NOTE — PLAN OF CARE
Problem: Chronic Conditions and Co-morbidities  Goal: Patient's chronic conditions and co-morbidity symptoms are monitored and maintained or improved  2/4/2025 1801 by Alexis Castellano RN  Outcome: Progressing  Flowsheets (Taken 2/4/2025 0742)  Care Plan - Patient's Chronic Conditions and Co-Morbidity Symptoms are Monitored and Maintained or Improved: Monitor and assess patient's chronic conditions and comorbid symptoms for stability, deterioration, or improvement  2/4/2025 0651 by Miladys Bahena RN  Outcome: Progressing  2/4/2025 0642 by Alex Arzola RN  Outcome: Progressing  Flowsheets (Taken 2/3/2025 2007)  Care Plan - Patient's Chronic Conditions and Co-Morbidity Symptoms are Monitored and Maintained or Improved: Monitor and assess patient's chronic conditions and comorbid symptoms for stability, deterioration, or improvement     Problem: Discharge Planning  Goal: Discharge to home or other facility with appropriate resources  2/4/2025 1801 by Alexis Castellano RN  Outcome: Progressing  Flowsheets (Taken 2/4/2025 0742)  Discharge to home or other facility with appropriate resources: Identify barriers to discharge with patient and caregiver  2/4/2025 0651 by Miladys Bahena RN  Outcome: Progressing  2/4/2025 0642 by Alex Arzola RN  Outcome: Progressing  Flowsheets (Taken 2/3/2025 2007)  Discharge to home or other facility with appropriate resources: Identify barriers to discharge with patient and caregiver     Problem: Safety - Adult  Goal: Free from fall injury  2/4/2025 1801 by Alexis Castellano RN  Outcome: Progressing  2/4/2025 0651 by Miladys Bahena RN  Outcome: Progressing  2/4/2025 0642 by Alex Arzola RN  Outcome: Progressing  Flowsheets (Taken 2/3/2025 2248)  Free From Fall Injury: Instruct family/caregiver on patient safety     Problem: Skin/Tissue Integrity  Goal: Skin integrity remains intact  Description: 1.  Monitor for areas of redness and/or skin breakdown  2.  Assess vascular

## 2025-02-04 NOTE — CARE COORDINATION
Peer Recovery Support Note       Name:  Jose Wesley   Date:    2/4/2025        Chief Complaint   Patient presents with    Abdominal Pain     Daily drinking last 3 month approximately 4 cups of vodka and started to notice some abd pain and spasms coughing spasms           Peer Support met with patient.  [] Support and education provided  [] Resources provided   [] Treatment referral:   [x] Other: Consult with CM about patient  [] Patient declined peer recovery services      Referred By: Alma (GIULIANA)     Notes:  I was contacted to see the patient yesterday 02/03/2025 by Liv (GIULIANA) but, the Lead Peer expressed to Liv that she would have a Peer come to see the patient today 02/04/2025.    I reported to Liv to check in with her and she informed me that the patient has been moved from ICU up to the 5th floor.    I then went to the 5th floor to see who was assigned to the patient. MsShabbir Marquez (GIULIANA) has this patient, so we dicussed the direction this patient could go in concerning aftercare, and she informed me that the patient may need a higher level of care.    I let her know that if his statis changes, to please let me know because, as it stands right now; there is no traditional [facility] rehab facility that would be willing to take this patient on because he would be a risk, and traditional [facility] facilities would express that this patient needs a higher level of care and she agreed with me.    Electronically signed by Cuong Salazar on 2/4/2025 at 1:17 PM

## 2025-02-04 NOTE — PLAN OF CARE
Problem: Discharge Planning  Goal: Discharge to home or other facility with appropriate resources  Outcome: Progressing  Flowsheets (Taken 2/3/2025 2007)  Discharge to home or other facility with appropriate resources: Identify barriers to discharge with patient and caregiver     Problem: Chronic Conditions and Co-morbidities  Goal: Patient's chronic conditions and co-morbidity symptoms are monitored and maintained or improved  Outcome: Progressing  Flowsheets (Taken 2/3/2025 2007)  Care Plan - Patient's Chronic Conditions and Co-Morbidity Symptoms are Monitored and Maintained or Improved: Monitor and assess patient's chronic conditions and comorbid symptoms for stability, deterioration, or improvement     Problem: Safety - Adult  Goal: Free from fall injury  Outcome: Progressing  Flowsheets (Taken 2/3/2025 1867)  Free From Fall Injury: Instruct family/caregiver on patient safety

## 2025-02-05 LAB
ANION GAP SERPL CALCULATED.3IONS-SCNC: 9 MMOL/L (ref 7–16)
BASOPHILS # BLD: 0.04 K/UL (ref 0–0.2)
BASOPHILS NFR BLD: 1 % (ref 0–2)
BUN SERPL-MCNC: 7 MG/DL (ref 6–20)
CALCIUM SERPL-MCNC: 8.3 MG/DL (ref 8.6–10.2)
CHLORIDE SERPL-SCNC: 97 MMOL/L (ref 98–107)
CO2 SERPL-SCNC: 27 MMOL/L (ref 22–29)
CREAT SERPL-MCNC: 0.8 MG/DL (ref 0.7–1.2)
EOSINOPHIL # BLD: 0.12 K/UL (ref 0.05–0.5)
EOSINOPHILS RELATIVE PERCENT: 2 % (ref 0–6)
ERYTHROCYTE [DISTWIDTH] IN BLOOD BY AUTOMATED COUNT: 13.5 % (ref 11.5–15)
GFR, ESTIMATED: >90 ML/MIN/1.73M2
GLUCOSE BLD-MCNC: 171 MG/DL (ref 74–99)
GLUCOSE BLD-MCNC: 181 MG/DL (ref 74–99)
GLUCOSE BLD-MCNC: 183 MG/DL (ref 74–99)
GLUCOSE BLD-MCNC: 218 MG/DL (ref 74–99)
GLUCOSE SERPL-MCNC: 168 MG/DL (ref 74–99)
HCT VFR BLD AUTO: 32.9 % (ref 37–54)
HGB BLD-MCNC: 11 G/DL (ref 12.5–16.5)
IMM GRANULOCYTES # BLD AUTO: 0.21 K/UL (ref 0–0.58)
IMM GRANULOCYTES NFR BLD: 4 % (ref 0–5)
LYMPHOCYTES NFR BLD: 0.97 K/UL (ref 1.5–4)
LYMPHOCYTES RELATIVE PERCENT: 18 % (ref 20–42)
MAGNESIUM SERPL-MCNC: 1.9 MG/DL (ref 1.6–2.6)
MCH RBC QN AUTO: 35 PG (ref 26–35)
MCHC RBC AUTO-ENTMCNC: 33.4 G/DL (ref 32–34.5)
MCV RBC AUTO: 104.8 FL (ref 80–99.9)
MONOCYTES NFR BLD: 1.17 K/UL (ref 0.1–0.95)
MONOCYTES NFR BLD: 21 % (ref 2–12)
NEUTROPHILS NFR BLD: 54 % (ref 43–80)
NEUTS SEG NFR BLD: 2.96 K/UL (ref 1.8–7.3)
PHOSPHATE SERPL-MCNC: 2.3 MG/DL (ref 2.5–4.5)
PLATELET # BLD AUTO: 223 K/UL (ref 130–450)
PMV BLD AUTO: 10.8 FL (ref 7–12)
POTASSIUM SERPL-SCNC: 4.3 MMOL/L (ref 3.5–5)
RBC # BLD AUTO: 3.14 M/UL (ref 3.8–5.8)
SODIUM SERPL-SCNC: 133 MMOL/L (ref 132–146)
WBC OTHER # BLD: 5.5 K/UL (ref 4.5–11.5)

## 2025-02-05 PROCEDURE — 83735 ASSAY OF MAGNESIUM: CPT

## 2025-02-05 PROCEDURE — 2500000003 HC RX 250 WO HCPCS: Performed by: INTERNAL MEDICINE

## 2025-02-05 PROCEDURE — 99232 SBSQ HOSP IP/OBS MODERATE 35: CPT | Performed by: FAMILY MEDICINE

## 2025-02-05 PROCEDURE — 6360000002 HC RX W HCPCS: Performed by: INTERNAL MEDICINE

## 2025-02-05 PROCEDURE — 97110 THERAPEUTIC EXERCISES: CPT | Performed by: PHYSICAL THERAPIST

## 2025-02-05 PROCEDURE — 36415 COLL VENOUS BLD VENIPUNCTURE: CPT

## 2025-02-05 PROCEDURE — 84100 ASSAY OF PHOSPHORUS: CPT

## 2025-02-05 PROCEDURE — 80048 BASIC METABOLIC PNL TOTAL CA: CPT

## 2025-02-05 PROCEDURE — 6370000000 HC RX 637 (ALT 250 FOR IP): Performed by: INTERNAL MEDICINE

## 2025-02-05 PROCEDURE — 6370000000 HC RX 637 (ALT 250 FOR IP): Performed by: FAMILY MEDICINE

## 2025-02-05 PROCEDURE — 2060000000 HC ICU INTERMEDIATE R&B

## 2025-02-05 PROCEDURE — 97530 THERAPEUTIC ACTIVITIES: CPT | Performed by: PHYSICAL THERAPIST

## 2025-02-05 PROCEDURE — 82962 GLUCOSE BLOOD TEST: CPT

## 2025-02-05 PROCEDURE — 99231 SBSQ HOSP IP/OBS SF/LOW 25: CPT | Performed by: INTERNAL MEDICINE

## 2025-02-05 PROCEDURE — 85025 COMPLETE CBC W/AUTO DIFF WBC: CPT

## 2025-02-05 RX ORDER — BISACODYL 5 MG/1
10 TABLET, DELAYED RELEASE ORAL ONCE
Status: COMPLETED | OUTPATIENT
Start: 2025-02-05 | End: 2025-02-05

## 2025-02-05 RX ORDER — BISACODYL 10 MG
10 SUPPOSITORY, RECTAL RECTAL DAILY
Status: DISCONTINUED | OUTPATIENT
Start: 2025-02-05 | End: 2025-02-05

## 2025-02-05 RX ORDER — FOLIC ACID 1 MG/1
1 TABLET ORAL DAILY
Qty: 30 TABLET | Refills: 0 | Status: SHIPPED | OUTPATIENT
Start: 2025-02-05

## 2025-02-05 RX ORDER — PANTOPRAZOLE SODIUM 40 MG/1
40 TABLET, DELAYED RELEASE ORAL
Qty: 30 TABLET | Refills: 3 | Status: SHIPPED | OUTPATIENT
Start: 2025-02-06

## 2025-02-05 RX ORDER — GABAPENTIN 300 MG/1
300 CAPSULE ORAL 3 TIMES DAILY
Qty: 90 CAPSULE | Refills: 0 | Status: SHIPPED | OUTPATIENT
Start: 2025-02-05 | End: 2025-03-07

## 2025-02-05 RX ORDER — INSULIN GLARGINE 100 [IU]/ML
8 INJECTION, SOLUTION SUBCUTANEOUS 2 TIMES DAILY
Qty: 2 ADJUSTABLE DOSE PRE-FILLED PEN SYRINGE | Refills: 3 | Status: SHIPPED | OUTPATIENT
Start: 2025-02-05

## 2025-02-05 RX ORDER — INSULIN LISPRO 100 [IU]/ML
INJECTION, SOLUTION INTRAVENOUS; SUBCUTANEOUS
Qty: 2 ADJUSTABLE DOSE PRE-FILLED PEN SYRINGE | Refills: 5 | Status: SHIPPED | OUTPATIENT
Start: 2025-02-05

## 2025-02-05 RX ORDER — THIAMINE MONONITRATE (VIT B1) 100 MG
100 TABLET ORAL DAILY
Qty: 30 TABLET | Refills: 0 | Status: SHIPPED | OUTPATIENT
Start: 2025-02-05

## 2025-02-05 RX ORDER — VALPROIC ACID 250 MG/1
250 CAPSULE ORAL 2 TIMES DAILY
Qty: 60 CAPSULE | Refills: 0 | Status: SHIPPED | OUTPATIENT
Start: 2025-02-05

## 2025-02-05 RX ADMIN — ENOXAPARIN SODIUM 40 MG: 100 INJECTION SUBCUTANEOUS at 07:48

## 2025-02-05 RX ADMIN — DOCUSATE SODIUM 100 MG: 100 CAPSULE, LIQUID FILLED ORAL at 21:15

## 2025-02-05 RX ADMIN — INSULIN LISPRO 1 UNITS: 100 INJECTION, SOLUTION INTRAVENOUS; SUBCUTANEOUS at 08:41

## 2025-02-05 RX ADMIN — POLYETHYLENE GLYCOL 3350 17 G: 17 POWDER, FOR SOLUTION ORAL at 07:50

## 2025-02-05 RX ADMIN — GABAPENTIN 300 MG: 300 CAPSULE ORAL at 13:46

## 2025-02-05 RX ADMIN — Medication 10 ML: at 21:16

## 2025-02-05 RX ADMIN — GABAPENTIN 300 MG: 300 CAPSULE ORAL at 07:46

## 2025-02-05 RX ADMIN — INSULIN GLARGINE 8 UNITS: 100 INJECTION, SOLUTION SUBCUTANEOUS at 21:35

## 2025-02-05 RX ADMIN — Medication 10 ML: at 07:48

## 2025-02-05 RX ADMIN — INSULIN LISPRO 1 UNITS: 100 INJECTION, SOLUTION INTRAVENOUS; SUBCUTANEOUS at 21:36

## 2025-02-05 RX ADMIN — GABAPENTIN 300 MG: 300 CAPSULE ORAL at 21:15

## 2025-02-05 RX ADMIN — VALPROIC ACID 250 MG: 250 CAPSULE ORAL at 21:15

## 2025-02-05 RX ADMIN — FOLIC ACID 1 MG: 1 TABLET ORAL at 07:46

## 2025-02-05 RX ADMIN — BISACODYL 10 MG: 5 TABLET, COATED ORAL at 09:43

## 2025-02-05 RX ADMIN — VALPROIC ACID 250 MG: 250 CAPSULE ORAL at 07:46

## 2025-02-05 RX ADMIN — INSULIN GLARGINE 8 UNITS: 100 INJECTION, SOLUTION SUBCUTANEOUS at 07:51

## 2025-02-05 RX ADMIN — Medication 100 MG: at 07:46

## 2025-02-05 RX ADMIN — PANTOPRAZOLE SODIUM 40 MG: 40 TABLET, DELAYED RELEASE ORAL at 05:24

## 2025-02-05 RX ADMIN — OXYCODONE 10 MG: 5 TABLET ORAL at 21:25

## 2025-02-05 RX ADMIN — INSULIN LISPRO 1 UNITS: 100 INJECTION, SOLUTION INTRAVENOUS; SUBCUTANEOUS at 12:36

## 2025-02-05 RX ADMIN — DOCUSATE SODIUM 100 MG: 100 CAPSULE, LIQUID FILLED ORAL at 07:46

## 2025-02-05 ASSESSMENT — PAIN SCALES - GENERAL
PAINLEVEL_OUTOF10: 7
PAINLEVEL_OUTOF10: 0

## 2025-02-05 ASSESSMENT — PAIN DESCRIPTION - LOCATION: LOCATION: HEAD

## 2025-02-05 ASSESSMENT — PAIN DESCRIPTION - DESCRIPTORS: DESCRIPTORS: ACHING

## 2025-02-05 ASSESSMENT — PAIN SCALES - WONG BAKER: WONGBAKER_NUMERICALRESPONSE: NO HURT

## 2025-02-05 NOTE — CARE COORDINATION
2/5/25 1042 CM note: respir panel (-) 1/31/25. Room air. Discharge plan is [facility]. Per Sulema [facility] liaison, they can accept pt and WILL START PRECERT. WILL NEED AUTH, SIGNED TERE, AND DC ORDER. HENS completed so precert could be submitted. Updated pt on above. Pt states he drinks 4 glasses of vodka per day, he declines speaking with Peer Recovery at this time, he is agreeable to outpt rehab/counseling lists-provided. CM will follow. Electronically signed by Alma Espinosa RN on 2/5/2025 at 10:45 AM

## 2025-02-05 NOTE — DISCHARGE INSTR - COC
Alcoholic ketosis (HCC) E88.89    Pancytopenia (HCC) D61.818    Elevated bilirubin R17    Hepatitis K75.9       Isolation/Infection:   Isolation            No Isolation          Patient Infection Status       None to display                     Nurse Assessment:  Last Vital Signs: BP (!) 142/86   Pulse 79   Temp 98.4 °F (36.9 °C) (Oral)   Resp 18   Ht 1.702 m (5' 7\")   Wt 85.3 kg (188 lb)   SpO2 96%   BMI 29.44 kg/m²     Last documented pain score (0-10 scale): Pain Level: 1  Last Weight:   Wt Readings from Last 1 Encounters:   02/03/25 85.3 kg (188 lb)     Mental Status:  oriented, alert, thought processes intact, and able to concentrate and follow conversation    IV Access:  - None    Nursing Mobility/ADLs:  Walking   Assisted  Transfer  Assisted  Bathing  Assisted  Dressing  Assisted  Toileting  Assisted  Feeding  Independent  Med Admin  Independent  Med Delivery   whole    Wound Care Documentation and Therapy:  Wound 08/15/24 Back Medial (Active)   Number of days: 173        Elimination:  Continence:   Bowel: No  Bladder: Yes  Urinary Catheter: None   Colostomy/Ileostomy/Ileal Conduit: No       Date of Last BM: 2-6-2025    Intake/Output Summary (Last 24 hours) at 2/5/2025 1047  Last data filed at 2/5/2025 0927  Gross per 24 hour   Intake --   Output 4400 ml   Net -4400 ml     I/O last 3 completed shifts:  In: -   Out: 4700 [Urine:4700]    Safety Concerns:     At Risk for Falls and History of Seizures    Impairments/Disabilities:      Speech and intermittent delayed responses    Nutrition Therapy:  Current Nutrition Therapy:   - Oral Diet:  Carb Control 5 carbs/meal (2000kcals/day)    Routes of Feeding: Oral  Liquids: No Restrictions  Daily Fluid Restriction: no  Last Modified Barium Swallow with Video (Video Swallowing Test): not done    Treatments at the Time of Hospital Discharge:   Respiratory Treatments: N/A  Oxygen Therapy:  is not on home oxygen therapy.  Ventilator:    - No ventilator

## 2025-02-05 NOTE — PLAN OF CARE
Problem: Chronic Conditions and Co-morbidities  Goal: Patient's chronic conditions and co-morbidity symptoms are monitored and maintained or improved  2/5/2025 0714 by Alex Arzola RN  Outcome: Progressing  2/4/2025 1801 by Alexis Castellano RN  Outcome: Progressing  Flowsheets (Taken 2/4/2025 0742)  Care Plan - Patient's Chronic Conditions and Co-Morbidity Symptoms are Monitored and Maintained or Improved: Monitor and assess patient's chronic conditions and comorbid symptoms for stability, deterioration, or improvement     Problem: Discharge Planning  Goal: Discharge to home or other facility with appropriate resources  2/5/2025 0714 by Alex Arzola RN  Outcome: Progressing  2/4/2025 1801 by Alexis Castellano RN  Outcome: Progressing  Flowsheets (Taken 2/4/2025 0742)  Discharge to home or other facility with appropriate resources: Identify barriers to discharge with patient and caregiver     Problem: Safety - Adult  Goal: Free from fall injury  2/5/2025 0714 by Aelx Arzola RN  Outcome: Progressing  Flowsheets (Taken 2/4/2025 2010)  Free From Fall Injury: Instruct family/caregiver on patient safety  2/4/2025 1801 by Alexis Castellano RN  Outcome: Progressing

## 2025-02-06 VITALS
BODY MASS INDEX: 32.03 KG/M2 | RESPIRATION RATE: 21 BRPM | TEMPERATURE: 98.6 F | HEART RATE: 101 BPM | OXYGEN SATURATION: 94 % | DIASTOLIC BLOOD PRESSURE: 99 MMHG | SYSTOLIC BLOOD PRESSURE: 110 MMHG | WEIGHT: 204.1 LBS | HEIGHT: 67 IN

## 2025-02-06 LAB
ANION GAP SERPL CALCULATED.3IONS-SCNC: 12 MMOL/L (ref 7–16)
BUN SERPL-MCNC: 10 MG/DL (ref 6–20)
CALCIUM SERPL-MCNC: 8.5 MG/DL (ref 8.6–10.2)
CHLORIDE SERPL-SCNC: 98 MMOL/L (ref 98–107)
CO2 SERPL-SCNC: 23 MMOL/L (ref 22–29)
CREAT SERPL-MCNC: 0.9 MG/DL (ref 0.7–1.2)
GFR, ESTIMATED: >90 ML/MIN/1.73M2
GLUCOSE BLD-MCNC: 111 MG/DL (ref 74–99)
GLUCOSE BLD-MCNC: 141 MG/DL (ref 74–99)
GLUCOSE BLD-MCNC: 190 MG/DL (ref 74–99)
GLUCOSE SERPL-MCNC: 192 MG/DL (ref 74–99)
MAGNESIUM SERPL-MCNC: 1.7 MG/DL (ref 1.6–2.6)
POTASSIUM SERPL-SCNC: 4.7 MMOL/L (ref 3.5–5)
SODIUM SERPL-SCNC: 133 MMOL/L (ref 132–146)

## 2025-02-06 PROCEDURE — 6370000000 HC RX 637 (ALT 250 FOR IP): Performed by: INTERNAL MEDICINE

## 2025-02-06 PROCEDURE — 80048 BASIC METABOLIC PNL TOTAL CA: CPT

## 2025-02-06 PROCEDURE — 97110 THERAPEUTIC EXERCISES: CPT

## 2025-02-06 PROCEDURE — 6360000002 HC RX W HCPCS: Performed by: INTERNAL MEDICINE

## 2025-02-06 PROCEDURE — 97530 THERAPEUTIC ACTIVITIES: CPT

## 2025-02-06 PROCEDURE — 83735 ASSAY OF MAGNESIUM: CPT

## 2025-02-06 PROCEDURE — 97535 SELF CARE MNGMENT TRAINING: CPT

## 2025-02-06 PROCEDURE — 99232 SBSQ HOSP IP/OBS MODERATE 35: CPT | Performed by: INTERNAL MEDICINE

## 2025-02-06 PROCEDURE — 36415 COLL VENOUS BLD VENIPUNCTURE: CPT

## 2025-02-06 PROCEDURE — 82962 GLUCOSE BLOOD TEST: CPT

## 2025-02-06 PROCEDURE — 2500000003 HC RX 250 WO HCPCS: Performed by: INTERNAL MEDICINE

## 2025-02-06 RX ORDER — GABAPENTIN 100 MG/1
100 CAPSULE ORAL 3 TIMES DAILY
Status: DISCONTINUED | OUTPATIENT
Start: 2025-02-06 | End: 2025-02-06 | Stop reason: HOSPADM

## 2025-02-06 RX ADMIN — FOLIC ACID 1 MG: 1 TABLET ORAL at 10:48

## 2025-02-06 RX ADMIN — PANTOPRAZOLE SODIUM 40 MG: 40 TABLET, DELAYED RELEASE ORAL at 05:05

## 2025-02-06 RX ADMIN — GABAPENTIN 300 MG: 300 CAPSULE ORAL at 10:48

## 2025-02-06 RX ADMIN — VALPROIC ACID 250 MG: 250 CAPSULE ORAL at 10:49

## 2025-02-06 RX ADMIN — ENOXAPARIN SODIUM 40 MG: 100 INJECTION SUBCUTANEOUS at 10:49

## 2025-02-06 RX ADMIN — DOCUSATE SODIUM 100 MG: 100 CAPSULE, LIQUID FILLED ORAL at 10:48

## 2025-02-06 RX ADMIN — INSULIN GLARGINE 8 UNITS: 100 INJECTION, SOLUTION SUBCUTANEOUS at 10:56

## 2025-02-06 RX ADMIN — OXYCODONE 10 MG: 5 TABLET ORAL at 10:56

## 2025-02-06 RX ADMIN — Medication 100 MG: at 10:48

## 2025-02-06 RX ADMIN — INSULIN LISPRO 1 UNITS: 100 INJECTION, SOLUTION INTRAVENOUS; SUBCUTANEOUS at 12:54

## 2025-02-06 RX ADMIN — Medication 10 ML: at 10:49

## 2025-02-06 RX ADMIN — POLYETHYLENE GLYCOL 3350 17 G: 17 POWDER, FOR SOLUTION ORAL at 10:49

## 2025-02-06 ASSESSMENT — PAIN DESCRIPTION - DESCRIPTORS: DESCRIPTORS: ACHING

## 2025-02-06 ASSESSMENT — PAIN SCALES - GENERAL
PAINLEVEL_OUTOF10: 2
PAINLEVEL_OUTOF10: 7

## 2025-02-06 ASSESSMENT — PAIN DESCRIPTION - ORIENTATION: ORIENTATION: RIGHT

## 2025-02-06 ASSESSMENT — PAIN DESCRIPTION - LOCATION: LOCATION: ABDOMEN

## 2025-02-06 NOTE — DISCHARGE SUMMARY
Discharge Summary  Patient ID:  Jose Wesley  [id number]  54 y.o. 1970 male  Alena Guerrero MD        Admit date: 1/31/2025    Discharge date and time:  2/6/2025  12:25 PM      Activity level: Baseline up with assist and assistive device to advance as able/tolerated  Diet: Diabetic diet  Labs: None  Disposition: [facility]  Condition on Discharge: Stable  DME: None    Admit Diagnoses:   Patient Active Problem List   Diagnosis    Acute renal failure (HCC)    Alcohol-induced acute pancreatitis without infection or necrosis    Severe alcohol use disorder (HCC)    Alcohol withdrawal syndrome without complication (HCC)    Alcoholic ketoacidosis    Diabetes mellitus (HCC)    Interstitial pancreatitis (HCC)    Hypertension    Hyperlipidemia    Diabetic retinopathy (HCC)    Alcoholic hepatitis    Diabetic ketoacidosis without coma associated with type 2 diabetes mellitus (HCC)    Severe episode of recurrent major depressive disorder, without psychotic features (HCC)    Pseudohyponatremia    Hyperkalemia    Alcoholic ketosis (HCC)    Pancytopenia (HCC)    Elevated bilirubin    Hepatitis       Discharge Diagnoses: Principal Problem:    Diabetic ketoacidosis without coma associated with type 2 diabetes mellitus (HCC)  Active Problems:    Severe alcohol use disorder (HCC)    Alcohol withdrawal syndrome without complication (HCC)    Alcoholic ketoacidosis    Hypertension    Diabetic retinopathy (HCC)    Alcoholic hepatitis    Severe episode of recurrent major depressive disorder, without psychotic features (HCC)    Pseudohyponatremia    Hyperkalemia    Alcoholic ketosis (HCC)    Pancytopenia (HCC)    Elevated bilirubin    Hepatitis  Resolved Problems:    * No resolved hospital problems. *      Consults:  IP CONSULT TO CRITICAL CARE  IP CONSULT TO DIABETES EDUCATOR    Procedures: None    Hospital Course: 54 y.o. male with a history of severe alcohol use disorder with recent relapse, type 2 diabetes mellitus, and primary

## 2025-02-06 NOTE — PROGRESS NOTES
[facility] Hospitalist   Progress Note    Admitting Date and Time: 1/31/2025  8:19 AM  Admit Dx: Hepatitis [K75.9]  Alcoholic ketosis (HCC) [E88.89]  Diabetic ketoacidosis without coma associated with type 2 diabetes mellitus (HCC) [E11.10]    Subjective/interval history:    2/1: Pt feels better overall though still has epigastric pain.  Remains on insulin drip for DKA and severe alcoholic ketoacidosis.    2/2: Patient feels better, but appears awake today.  Notes that he will awaken with has appeared more somnolent throughout the day.    Notes that his epigastric pain has nearly resolved.  Tolerating diet.  Remains on insulin drip for DKA and alcoholic ketoacidosis.    ROS: denies fever, chills, cp, sob, n/v, HA unless stated above.     gabapentin  600 mg Oral TID    valproate (DEPACON) 500 mg in sodium chloride 0.9 % 100 mL IVPB  500 mg IntraVENous Q8H    thiamine (B-1) 500 mg in sodium chloride 0.9 % 100 mL IVPB  500 mg IntraVENous Daily    enoxaparin  40 mg SubCUTAneous Daily    PHENobarbital  64.8 mg Oral Q6H    Followed by    PHENobarbital  32.4 mg Oral TID    Followed by    [START ON 2/3/2025] PHENobarbital  16.2 mg Oral BID    sodium chloride flush  5-40 mL IntraVENous 2 times per day    folic acid  1 mg Oral Daily    pantoprazole (PROTONIX) 40 mg in sodium chloride (PF) 0.9 % 10 mL injection  40 mg IntraVENous Q12H     glucose, 4 tablet, PRN  dextrose bolus, 125 mL, PRN   Or  dextrose bolus, 250 mL, PRN  glucagon (rDNA), 1 mg, PRN  dextrose, , Continuous PRN  LORazepam, 1 mg, Q4H PRN  potassium chloride, 10 mEq, PRN  magnesium sulfate, 2,000 mg, PRN  sodium phosphate 15 mmol in sodium chloride 0.9 % 250 mL IVPB, 15 mmol, PRN  polyethylene glycol, 17 g, Daily PRN  sodium chloride flush, 5-40 mL, PRN  sodium chloride, , PRN  oxyCODONE, 5 mg, Q6H PRN   Or  oxyCODONE, 10 mg, Q6H PRN         Objective:    BP (!) 132/98   Pulse (!) 101   Temp 98.7 °F (37.1 °C) (Oral)   Resp 20   Ht 1.702 m 
       [facility] Hospitalist Progress Note    Admitting Date and Time: 1/31/2025  8:19 AM  Admit Dx: Hepatitis [K75.9]  Alcoholic ketosis (HCC) [E88.89]  Diabetic ketoacidosis without coma associated with type 2 diabetes mellitus (HCC) [E11.10]      Assessment:    Principal Problem:    Diabetic ketoacidosis without coma associated with type 2 diabetes mellitus (HCC)  Active Problems:    Severe alcohol use disorder (HCC)    Alcohol withdrawal syndrome without complication (HCC)    Alcoholic ketoacidosis    Hypertension    Diabetic retinopathy (HCC)    Alcoholic hepatitis    Severe episode of recurrent major depressive disorder, without psychotic features (HCC)    Pseudohyponatremia    Hyperkalemia    Alcoholic ketosis (HCC)    Pancytopenia (HCC)  Resolved Problems:    * No resolved hospital problems. *      Plan:  1/31/2025  Diabetic ketoacidosis associated with uncontrolled type 2 diabetes mellitus with diabetic retinopathy   Alcoholic ketoacidosis  -IV fluid bolus followed by continuous IV fluids per DKA protocol  -Start insulin drip without bolus  -Serial chemistries with electrolyte replacement per DKA protocol  -Consult diabetes educator     Acute alcoholic hepatitis   -MELD score 17  -Discriminant function score is 5; steroids not warranted at this time  -Counseled on abstinence from alcohol.  Will discuss further when he has medically improved     Severe alcohol use disorder with uncomplicated alcohol withdrawal syndrome  -Start phenobarbital taper.  Will need to closely monitor liver function and discontinue if worsening.  In addition will use as needed Ativan per CIWA protocol  -IV thiamine and p.o. folate supplementation  -Social work and peer recovery consult     Primary hypertension  -Was previously on amlodipine.  On prior admission, patient had significant orthostatic hypotension and amlodipine was discontinued.  Will monitor blood pressure closely and initiate antihypertensives if needed     Major 
       [facility] Hospitalist Progress Note    Admitting Date and Time: 1/31/2025  8:19 AM  Admit Dx: Hepatitis [K75.9]  Alcoholic ketosis (HCC) [E88.89]  Diabetic ketoacidosis without coma associated with type 2 diabetes mellitus (HCC) [E11.10]      Assessment:    Principal Problem:    Diabetic ketoacidosis without coma associated with type 2 diabetes mellitus (HCC)  Active Problems:    Severe alcohol use disorder (HCC)    Alcohol withdrawal syndrome without complication (HCC)    Alcoholic ketoacidosis    Hypertension    Diabetic retinopathy (HCC)    Alcoholic hepatitis    Severe episode of recurrent major depressive disorder, without psychotic features (HCC)    Pseudohyponatremia    Hyperkalemia    Alcoholic ketosis (HCC)    Pancytopenia (HCC)    Elevated bilirubin    Hepatitis  Resolved Problems:    * No resolved hospital problems. *      Plan:  1/31/2025  Diabetic ketoacidosis associated with uncontrolled type 2 diabetes mellitus with diabetic retinopathy   Alcoholic ketoacidosis  -IV fluid bolus followed by continuous IV fluids per DKA protocol  -Start insulin drip without bolus  -Serial chemistries with electrolyte replacement per DKA protocol  -Consult diabetes educator     Acute alcoholic hepatitis   -MELD score 17  -Discriminant function score is 5; steroids not warranted at this time  -Counseled on abstinence from alcohol.  Will discuss further when he has medically improved     Severe alcohol use disorder with uncomplicated alcohol withdrawal syndrome  -Start phenobarbital taper.  Will need to closely monitor liver function and discontinue if worsening.  In addition will use as needed Ativan per CIWA protocol  -IV thiamine and p.o. folate supplementation  -Social work and peer recovery consult     Primary hypertension  -Was previously on amlodipine.  On prior admission, patient had significant orthostatic hypotension and amlodipine was discontinued.  Will monitor blood pressure closely and initiate 
       [facility] Hospitalist   Progress Note    Admitting Date and Time: 1/31/2025  8:19 AM  Admit Dx: Hepatitis [K75.9]  Alcoholic ketosis (HCC) [E88.89]  Diabetic ketoacidosis without coma associated with type 2 diabetes mellitus (HCC) [E11.10]    Subjective/interval history:    Pt feels better overall though still has epigastric pain.  Remains on insulin drip for DKA and severe alcoholic ketoacidosis.    ROS: denies fever, chills, cp, sob, n/v, HA unless stated above.     enoxaparin  40 mg SubCUTAneous Daily    PHENobarbital  97.2 mg Oral Q4H    Followed by    PHENobarbital  64.8 mg Oral Q6H    Followed by    [START ON 2/2/2025] PHENobarbital  32.4 mg Oral TID    Followed by    [START ON 2/3/2025] PHENobarbital  16.2 mg Oral BID    sodium chloride flush  5-40 mL IntraVENous 2 times per day    thiamine  100 mg IntraVENous Daily    folic acid  1 mg Oral Daily    pantoprazole (PROTONIX) 40 mg in sodium chloride (PF) 0.9 % 10 mL injection  40 mg IntraVENous Q12H     dextrose bolus, 125 mL, PRN   Or  dextrose bolus, 250 mL, PRN  potassium chloride, 10 mEq, PRN  magnesium sulfate, 2,000 mg, PRN  sodium phosphate 15 mmol in sodium chloride 0.9 % 250 mL IVPB, 15 mmol, PRN  polyethylene glycol, 17 g, Daily PRN  sodium chloride flush, 5-40 mL, PRN  sodium chloride, , PRN  LORazepam, 1 mg, Q1H PRN   Or  LORazepam, 1 mg, Q1H PRN   Or  LORazepam, 2 mg, Q1H PRN   Or  LORazepam, 2 mg, Q1H PRN   Or  LORazepam, 3 mg, Q1H PRN   Or  LORazepam, 3 mg, Q1H PRN   Or  LORazepam, 4 mg, Q1H PRN   Or  LORazepam, 4 mg, Q1H PRN  oxyCODONE, 5 mg, Q6H PRN   Or  oxyCODONE, 10 mg, Q6H PRN         Objective:    /79   Pulse 73   Temp 97.5 °F (36.4 °C) (Oral)   Resp 15   Ht 1.702 m (5' 7\")   Wt 85.5 kg (188 lb 8 oz)   SpO2 97%   BMI 29.52 kg/m²   General Appearance: Alert and oriented x 3, not in any distress my  Skin: Warm and dry  Head: normocephalic and atraumatic  Eyes: Very mild scleral icterus.  Pupils equal, round, 
4 Eyes Skin Assessment     NAME:  Jose Wesley  YOB: 1970  MEDICAL RECORD NUMBER:  [id number]    The patient is being assessed for  Transfer to New Unit    I agree that at least one RN has performed a thorough Head to Toe Skin Assessment on the patient. ALL assessment sites listed below have been assessed.      Areas assessed by both nurses:    Head, Face, Ears, Shoulders, Back, Chest, Arms, Elbows, Hands, Sacrum. Buttock, Coccyx, Ischium, Legs. Feet and Heels, and Under Medical Devices         Does the Patient have a Wound? No noted wound(s)       Leobardo Prevention initiated by RN: Yes  Wound Care Orders initiated by RN: No    Pressure Injury (Stage 3,4, Unstageable, DTI, NWPT, and Complex wounds) if present, place Wound referral order by RN under : No    New Ostomies, if present place, Ostomy referral order under : No     Nurse 1 eSignature: Electronically signed by Alex Arzola RN on 2/4/25 at 12:36 AM EST    **SHARE this note so that the co-signing nurse can place an eSignature**    Nurse 2 eSignature: Electronically signed by Paola De RN on 2/4/25 at 6:44 AM EST   
4 Eyes Skin Assessment     NAME:  Jose Wesley  YOB: 1970  MEDICAL RECORD NUMBER:  [id number]    The patient is being assessed for  Admission    I agree that at least one RN has performed a thorough Head to Toe Skin Assessment on the patient. ALL assessment sites listed below have been assessed.      Areas assessed by both nurses:    Head, Face, Ears, Shoulders, Back, Chest, Arms, Elbows, Hands, Sacrum. Buttock, Coccyx, Ischium, Legs. Feet and Heels, and Under Medical Devices         Does the Patient have a Wound? No noted wound(s)       Leobardo Prevention initiated by RN: No  Wound Care Orders initiated by RN: No    Pressure Injury (Stage 3,4, Unstageable, DTI, NWPT, and Complex wounds) if present, place Wound referral order by RN under : No    New Ostomies, if present place, Ostomy referral order under : No     Nurse 1 eSignature: Electronically signed by Miladys Ruth RN on 1/31/25 at 6:43 PM EST    **SHARE this note so that the co-signing nurse can place an eSignature**    Nurse 2 eSignature: {Esignature:[id number]}   
Assessment and Plan  Patient is a 54 y.o. male with the following medical Problems:   Alcoholic ketoacidosis (beta-hydroxybutyrate greater than 4.5)  Diabetic ketoacidosis  Alcoholic hepatitis  Alcohol addiction  Pancytopenia due to alcohol abuse.  History of alcohol withdrawal/delirium tremens  Pancreatitis with elevated lipase  Hepatic steatosis, severe  Prostatic enlargement by CT       PLAN:  Lantus 8 units twice daily with insulin sliding scale.  IV Protonix  As needed Ativan for agitation and withdrawal.  Agree with phenobarbital taper  Scheduled gabapentin, valproic acid, thiamine, folic acid, tapering dose of phenobarbital for alcohol withdrawal  Thiamine, folate daily  Diabetic diet.        History of Present Illness:    The patient is a 54-year-old male admitted here in August 2024 with alcohol withdrawal syndrome, hyponatremia and pancreatitis.     Patient has a long history of extensive alcohol use reaching back \"for years\" usually Powerade mixed with vodka.  At times, he drinks much larger amounts than he does usually.     Over the last several weeks, he has alcohol intake is increased and he came to the emergency room last evening complaining of not being able to stand.  He was noted to be mildly tachypneic with a venous pH of 7.28 and a bicarbonate of 10.  Alcohol level was less than 10.  However, blood glucose was not terribly high and his hemoglobin A1c was 5.9 all of which suggest that there is a component of both alcoholic and possibly diabetic ketoacidosis.  The patient takes metformin for his diabetes and is not known to take SGLT2 inhibitors.     The patient has a long history of depression and uses alcohol to assuage his social issues.     The patient used to be employed at [employer] in [facility] and actually had a successful career.  However, he was offered a buyout and moved back to [place].  He does not recall being employed since.     In the emergency room, liver function 
Assessment and Plan  Patient is a 54 y.o. male with the following medical Problems:   Alcoholic ketoacidosis (beta-hydroxybutyrate greater than 4.5)  Diabetic ketoacidosis  Alcoholic hepatitis  Alcohol addiction  Pancytopenia due to alcohol abuse.  History of alcohol withdrawal/delirium tremens  Pancreatitis with elevated lipase  Hepatic steatosis, severe  Prostatic enlargement by CT       PLAN:  Lantus 8 units twice daily with insulin sliding scale.  IV Protonix  As needed Ativan for agitation and withdrawal.  Agree with phenobarbital taper  Scheduled gabapentin, valproic acid, thiamine, folic acid, tapering dose of phenobarbital for alcohol withdrawal  Thiamine, folate daily  Diabetic diet.        History of Present Illness:    The patient is a 54-year-old male admitted here in August 2024 with alcohol withdrawal syndrome, hyponatremia and pancreatitis.     Patient has a long history of extensive alcohol use reaching back \"for years\" usually Powerade mixed with vodka.  At times, he drinks much larger amounts than he does usually.     Over the last several weeks, he has alcohol intake is increased and he came to the emergency room last evening complaining of not being able to stand.  He was noted to be mildly tachypneic with a venous pH of 7.28 and a bicarbonate of 10.  Alcohol level was less than 10.  However, blood glucose was not terribly high and his hemoglobin A1c was 5.9 all of which suggest that there is a component of both alcoholic and possibly diabetic ketoacidosis.  The patient takes metformin for his diabetes and is not known to take SGLT2 inhibitors.     The patient has a long history of depression and uses alcohol to assuage his social issues.     The patient used to be employed at [employer] in [facility] and actually had a successful career.  However, he was offered a buyout and moved back to [place].  He does not recall being employed since.     In the emergency room, liver function 
Assessment and Plan  Patient is a 54 y.o. male with the following medical Problems:   Alcoholic ketoacidosis (beta-hydroxybutyrate greater than 4.5)  Diabetic ketoacidosis  Alcoholic hepatitis  Alcohol addiction  Pancytopenia due to alcohol abuse.  History of alcohol withdrawal/delirium tremens  Pancreatitis with elevated lipase  Hepatic steatosis, severe  Prostatic enlargement by CT       PLAN:  Lantus 8 units twice daily with insulin sliding scale.  IV Protonix  As needed Ativan for agitation and withdrawal.  Agree with phenobarbital taper  Scheduled gabapentin, valproic acid, thiamine, folic acid, tapering dose of phenobarbital for alcohol withdrawal  Thiamine, folate daily  Diabetic diet.        History of Present Illness:    The patient is a 54-year-old male admitted here in August 2024 with alcohol withdrawal syndrome, hyponatremia and pancreatitis.     Patient has a long history of extensive alcohol use reaching back \"for years\" usually Powerade mixed with vodka.  At times, he drinks much larger amounts than he does usually.     Over the last several weeks, he has alcohol intake is increased and he came to the emergency room last evening complaining of not being able to stand.  He was noted to be mildly tachypneic with a venous pH of 7.28 and a bicarbonate of 10.  Alcohol level was less than 10.  However, blood glucose was not terribly high and his hemoglobin A1c was 5.9 all of which suggest that there is a component of both alcoholic and possibly diabetic ketoacidosis.  The patient takes metformin for his diabetes and is not known to take SGLT2 inhibitors.     The patient has a long history of depression and uses alcohol to assuage his social issues.     The patient used to be employed at [employer] in [facility] and actually had a successful career.  However, he was offered a buyout and moved back to [place].  He does not recall being employed since.     In the emergency room, liver function 
Nurse to nurse given to SETH rangel @ [facility]  
OCCUPATIONAL THERAPY TREATMENT  [facility]  [address] [place]. [place]        Date:2025                                                  Patient Name: Jose Wesley    MRN: [id number]    : 1970    Room: [address] [place] [address]      Evaluating OT: Bam Stewart OTR/L; #[id number]     Referring Provider and Specific Provider Orders/Date:      25 124  OT eval and treat  Start:  25 1245,   End:  25 1245,   ONE TIME,   Standing Count:  1 Occurrences,   R         Karen Horan MD        Placement Recommendation: Subacute Rehab       Diagnosis:   1. Alcoholic ketosis (HCC)    2. Hepatitis    3. Alcohol withdrawal syndrome without complication (HCC)    4. Elevated bilirubin         Surgery: None      Pertinent Medical History:       Past Medical History:   Diagnosis Date    Alcohol use disorder     Alcohol-induced acute pancreatitis 2024    Diabetes mellitus (HCC)     Diabetic retinopathy (HCC)     Hyperlipidemia     Hypertension     Hyponatremia 2024    Orthostatic hypotension 2024         Past Surgical History:   Procedure Laterality Date    TONSILECTOMY, ADENOIDECTOMY, BILATERAL MYRINGOTOMY AND TUBES Bilateral     without adenoidectomy, no tubes in ear        Precautions:  Fall Risk, Up as Tolerated     Assessment of current deficits:     [x] Functional mobility  [x]ADLs  [x] Strength               []Cognition    [x] Functional transfers   [x] IADLs         [] Safety Awareness   [x]Endurance    [] Fine Coordination              [x] Balance      [] Vision/perception   []Sensation     []Gross Motor Coordination  [] ROM  [] Delirium                   [] Motor Control     OT PLAN OF CARE   OT POC based on physician orders, patient diagnosis and results of clinical assessment    Frequency/Duration 1-3 days/wk for 2 weeks PRN     Specific OT Treatment Interventions to include:   * Instruction/training on adapted ADL 
OCCUPATIONAL THERAPY TREATMENT  [facility]  [address] [place]. [place]        Date:2025                                                  Patient Name: Jose Wesley    MRN: [id number]    : 1970    Room: [address] [place] [address]      Evaluating OT: Bam Stewart OTR/L; #[id number]     Referring Provider and Specific Provider Orders/Date:      25  OT eval and treat  Start:  25 124,   End:  25 1245,   ONE TIME,   Standing Count:  1 Occurrences,   R         Karen Horan MD        Placement Recommendation: Subacute Rehab       Diagnosis:   1. Alcoholic ketosis (HCC)    2. Hepatitis    3. Alcohol withdrawal syndrome without complication (HCC)    4. Elevated bilirubin         Surgery: None      Pertinent Medical History:       Past Medical History:   Diagnosis Date    Alcohol use disorder     Alcohol-induced acute pancreatitis 2024    Diabetes mellitus (HCC)     Diabetic retinopathy (HCC)     Hyperlipidemia     Hypertension     Hyponatremia 2024    Orthostatic hypotension 2024         Past Surgical History:   Procedure Laterality Date    TONSILECTOMY, ADENOIDECTOMY, BILATERAL MYRINGOTOMY AND TUBES Bilateral     without adenoidectomy, no tubes in ear        Precautions:  Fall Risk, Up as Tolerated     Assessment of current deficits:     [x] Functional mobility  [x]ADLs  [x] Strength               []Cognition    [x] Functional transfers   [x] IADLs         [] Safety Awareness   [x]Endurance    [] Fine Coordination              [x] Balance      [] Vision/perception   []Sensation     []Gross Motor Coordination  [] ROM  [] Delirium                   [] Motor Control     OT PLAN OF CARE   OT POC based on physician orders, patient diagnosis and results of clinical assessment    Frequency/Duration 1-3 days/wk for 2 weeks PRN     Specific OT Treatment Interventions to include:   * Instruction/training on adapted ADL 
Physical Therapy Initial Evaluation/Plan of Care    Room #:  [id number]/HHQCUQ47-73  Patient Name: Jose Wesley  YOB: 1970  MRN: [id number]    Date of Service: 2/3/2025     Tentative placement recommendation: Subacute unless patient meets goals then Home Health Physical Therapy  Equipment recommendation: To be determined      Evaluating Physical Therapist: Mike Shearer, PT  #[id number]      Specific Provider Orders/Date/Referring Provider :     PT eval and treat  Start:  02/01/25 1245,   End:  02/01/25 1245,   ONE TIME,   Standing Count:  1 Occurrences,   R       Karen Horan MD    Admitting Diagnosis:   Hepatitis [K75.9]  Alcoholic ketosis (HCC) [E88.89]  Diabetic ketoacidosis without coma associated with type 2 diabetes mellitus (HCC) [E11.10]    Admitted with    abdominal pain, pancreatitis  Surgery: none  Visit Diagnoses         Codes    Hepatitis     K75.9    Elevated bilirubin     R17            Patient Active Problem List   Diagnosis    Acute renal failure (HCC)    Alcohol-induced acute pancreatitis without infection or necrosis    Severe alcohol use disorder (HCC)    Alcohol withdrawal syndrome without complication (HCC)    Alcoholic ketoacidosis    Diabetes mellitus (HCC)    Interstitial pancreatitis (HCC)    Hypertension    Hyperlipidemia    Diabetic retinopathy (HCC)    Alcoholic hepatitis    Diabetic ketoacidosis without coma associated with type 2 diabetes mellitus (HCC)    Severe episode of recurrent major depressive disorder, without psychotic features (HCC)    Pseudohyponatremia    Hyperkalemia    Alcoholic ketosis (HCC)    Pancytopenia (HCC)        ASSESSMENT of Current Deficits Patient exhibits decreased strength, balance, endurance, and coordination impairing functional mobility, transfers, gait , gait distance, and tolerance to activity are barriers to d/c and require skilled intervention to address concerns listed above to increase safety and independence at 
Physical Therapy Treatment Note/Plan of Care    Room #:  0518/0518-01  Patient Name: Jose Wesley  YOB: 1970  MRN: [id number]    Date of Service: 2/6/2025     Tentative placement recommendation: Subacute Rehab  Equipment recommendation: To be determined      Evaluating Physical Therapist: Mike Shearer, PT  #[id number]      Specific Provider Orders/Date/Referring Provider :     PT eval and treat  Start:  02/01/25 1245,   End:  02/01/25 1245,   ONE TIME,   Standing Count:  1 Occurrences,   R       Marline, Karen Presley MD    Admitting Diagnosis:   Hepatitis [K75.9]  Alcoholic ketosis (HCC) [E88.89]  Diabetic ketoacidosis without coma associated with type 2 diabetes mellitus (HCC) [E11.10]    Admitted with    abdominal pain, pancreatitis  Surgery: none        Patient Active Problem List   Diagnosis    Acute renal failure (HCC)    Alcohol-induced acute pancreatitis without infection or necrosis    Severe alcohol use disorder (HCC)    Alcohol withdrawal syndrome without complication (HCC)    Alcoholic ketoacidosis    Diabetes mellitus (HCC)    Interstitial pancreatitis (HCC)    Hypertension    Hyperlipidemia    Diabetic retinopathy (HCC)    Alcoholic hepatitis    Diabetic ketoacidosis without coma associated with type 2 diabetes mellitus (HCC)    Severe episode of recurrent major depressive disorder, without psychotic features (HCC)    Pseudohyponatremia    Hyperkalemia    Alcoholic ketosis (HCC)    Pancytopenia (HCC)    Elevated bilirubin    Hepatitis        ASSESSMENT of Current Deficits Patient exhibits decreased strength, balance, endurance, and coordination impairing functional mobility, transfers, gait , gait distance, and tolerance to activity.   Decreased strength, balance and endurance  increases patient's risk for fall. Patient able to increase gait distance this session with moderate assist for safety and stability; intermittently with posterior to right lateral lean, with verbal cues 
Physical Therapy Treatment Note/Plan of Care    Room #:  0518/0518-01  Patient Name: Jose Wesley  YOB: 1970  MRN: [id number]    Date of Service: 2/5/2025     Tentative placement recommendation: Subacute Rehab  Equipment recommendation: To be determined      Evaluating Physical Therapist: Mike Shearer, PT  #[id number]      Specific Provider Orders/Date/Referring Provider :     PT eval and treat  Start:  02/01/25 1245,   End:  02/01/25 1245,   ONE TIME,   Standing Count:  1 Occurrences,   R       Marline, Karen Presley MD    Admitting Diagnosis:   Hepatitis [K75.9]  Alcoholic ketosis (HCC) [E88.89]  Diabetic ketoacidosis without coma associated with type 2 diabetes mellitus (HCC) [E11.10]    Admitted with    abdominal pain, pancreatitis  Surgery: none        Patient Active Problem List   Diagnosis    Acute renal failure (HCC)    Alcohol-induced acute pancreatitis without infection or necrosis    Severe alcohol use disorder (HCC)    Alcohol withdrawal syndrome without complication (HCC)    Alcoholic ketoacidosis    Diabetes mellitus (HCC)    Interstitial pancreatitis (HCC)    Hypertension    Hyperlipidemia    Diabetic retinopathy (HCC)    Alcoholic hepatitis    Diabetic ketoacidosis without coma associated with type 2 diabetes mellitus (HCC)    Severe episode of recurrent major depressive disorder, without psychotic features (HCC)    Pseudohyponatremia    Hyperkalemia    Alcoholic ketosis (HCC)    Pancytopenia (HCC)    Elevated bilirubin    Hepatitis        ASSESSMENT of Current Deficits Patient exhibits decreased strength, balance, endurance, and coordination impairing functional mobility, transfers, gait , gait distance, and tolerance to activity are barriers to d/c and require skilled intervention to address concerns listed above to increase safety and independence at discharge.   Decreased strength, balance and endurance  increases patient's risk for fall. Pt very weak and unsteady for 
Reason for consult:  DKA    A1C:   5.9% (Low RBC's, Low HgB)          Patient states the following concerns/barriers to diabetes self-management:     [] None       [] Medication cost   [] Food cost/availability        [] Reading  [] Hearing   [] Vision                [] Work    [x] Transportation  [] No insurance  [] Physical limitations    [x] Other: Alcohol abuse        Patient states the following about their diabetes/health:   [x]   Has had Type 2 DM for a few years.  Family hx.  Needs transportation home- he says he can get a free ride somehow- wants to speak with social work/ care coordination.   [x]   Eats 1-2 meals per day.  He did not eat 5-6 days prior to hospitalization.  Was on a drinking binge.   [x]   Does not have a glucometer, but has used one in the past.   [x]   Prescribed Metformin.  Does not take it consistently- depending on how often he is drinking.   [x]   Not physically active.     Diabetes survival packet provided to:   [x] Patient     [] Other:    Information given:   Definition of diabetes   Target glucose ranges/A1C   Self-monitoring of blood glucose   Prevention/symptoms/treatment of hypo-/hyperglycemia   Medication adherence             The plate method/meal planning guidelines             The benefits of exercise and recommendations             Reducing the risk of chronic complications     Diabetes medications reviewed (use, purpose, action): Metformin            Post-education Assessment  [x]  Attentive to teaching  [x]  Answered questions appropriately when asked   []  Seems able to apply concepts to daily lifestyle  [x]  Seems motivated to do well  [x]  Verbalized an understanding of plate method  [x]  Verbalized an understanding of prescribed antidiabetes medications   [x]  Verbalized an understanding of target glucose ranges/A1C level  [x]  Expresses an intent to comply with treatment plan   []  Showed very little interest in complying with treatment plan   [x]  Seems to have 
Spiritual Health History and Assessment/Progress Note  [facility]    Spiritual/Emotional Needs,  ,  ,      Name: Jose Wesley MRN: [id number]    Age: 54 y.o.     Sex: male   Language: English   Druze: None   Diabetic ketoacidosis without coma associated with type 2 diabetes mellitus (HCC)     Date: 2/3/2025                           Spiritual Assessment began in [facility] ICU        Referral/Consult From: Rounding   Encounter Overview/Reason: Spiritual/Emotional Needs  Service Provided For: Patient    Paige, Belief, Meaning:   Patient has beliefs or practices that help with coping during difficult times  Family/Friends No family/friends present      Importance and Influence:  Patient has spiritual/personal beliefs that influence decisions regarding their health  Family/Friends No family/friends present    Community:  Patient feels well-supported. Support system includes: Extended family  Family/Friends No family/friends present    Assessment and Plan of Care:     Patient Interventions include: Facilitated expression of thoughts and feelings and Affirmed coping skills/support systems  Family/Friends Interventions include: No family/friends present    Patient Plan of Care: Spiritual Care available upon further referral  Family/Friends Plan of Care: No family/friends present    Electronically signed by Chaplain Rebeka on 2/3/2025 at 1:32 PM    
antihypertensives if needed     Major depressive disorder  -Untreated, which is contributing to his relapse  -Once he has been treated for DKA and electrolyte dyscrasias have resolved, will discuss outpatient referral to psychiatry and possibly initiating antidepressant prior to discharge     Hyperkalemia  -Due to acidosis  -Treatment as noted above     Pseudohyponatremia  -Treatment of DKA as noted above    2/1/2025  Diabetic ketoacidosis associated with uncontrolled type 2 diabetes mellitus with diabetic retinopathy   Alcoholic ketoacidosis  -Continue IV fluid hydration, insulin drip, serial chemistries with replacement per DKA protocol     Acute alcoholic hepatitis   -MELD score 17 on presentation  -Discriminant function score is 5; steroids not warranted at this time  -LFTs improving  -Counseled on abstinence from alcohol.  Will discuss further when he has medically improved     Severe alcohol use disorder with uncomplicated alcohol withdrawal syndrome  -Continue phenobarbital taper  -As needed lorazepam and Precedex  -IV thiamine and p.o. folate supplementation  -Social work and peer recovery consult     Primary hypertension  -Was previously on amlodipine.  On prior admission, patient had significant orthostatic hypotension and amlodipine was discontinued.  Will monitor blood pressure closely and initiate antihypertensives if needed     Major depressive disorder  -Untreated, which is contributing to his relapse  -Once he has been treated for DKA and electrolyte dyscrasias have resolved, will discuss outpatient referral to psychiatry and possibly initiating antidepressant prior to discharge     Hyperkalemia  -Due to acidosis  -Treatment as noted above     Pseudohyponatremia  -Treatment of DKA as noted above    2/2/2025  Diabetic ketoacidosis associated with uncontrolled type 2 diabetes mellitus with diabetic retinopathy   Alcoholic ketoacidosis  -Continue IV fluid hydration, insulin drip, serial chemistries with 
tests showed elevations of AST greater than ALT, modest hyperbilirubinemia and a modestly elevated lipase level.    Daily progress:  February 1, 2025: Patient continues to be on insulin drip as per DKA protocol.  He has been intermittently awake however he is mostly sleepy.  Precedex was held due to excessive sleepiness.  Patient has been requesting to eat.  He continues to exhibit withdrawal symptoms intermittently.    February 2, 2025: Patient's ketoacidosis has resolved and he was switched off insulin drip to Lantus 8 units twice daily with insulin sliding scale.  Precedex has been off.  Gabapentin dose will be reduced since patient is more sleepy today.  Phenobarbital dose was reduced already.  He remains on thiamine and folic acid.    February 3, 2025: Patient has been off Precedex and got 1 dose of Ativan over the last 24 hours.  Blood sugar has been much better controlled.  Depakote dose was reduced to 250 every 8 hours.  IV medications will be switched to oral .  Will continue to follow-up the patient for withdrawal symptoms.    February 4, 2025: Patient's withdrawal symptoms continue to improve.  Gabapentin and valproic acid were weaned down.  Patient received 2 doses of Ativan over the last 24 hours.  He has no fever, chills, rigors.  He continues to be on room air.  Blood sugar continues to improve.    Past Medical History:  Past Medical History:   Diagnosis Date    Alcohol use disorder     Alcohol-induced acute pancreatitis 08/13/2024    Diabetes mellitus (HCC)     Diabetic retinopathy (HCC)     Hyperlipidemia     Hypertension     Hyponatremia 08/14/2024    Orthostatic hypotension 08/14/2024      Past Surgical History:   Procedure Laterality Date    TONSILECTOMY, ADENOIDECTOMY, BILATERAL MYRINGOTOMY AND TUBES Bilateral     without adenoidectomy, no tubes in ear       Family History:   Family History   Problem Relation Age of Onset    COPD Mother     Liver Cancer Father     Diabetes Sister     
seated in bedside chair with call light and phone within reach, all lines and tubes intact.  Overall patient demonstrated decreased independence and safety during completion of ADL/functional transfer/mobility tasks.  Pt would benefit from continued skilled OT to increase safety and independence with completion of ADL/IADL tasks for functional independence and quality of life.    Treatment: OT treatment provided this date includes:   Instruction/training on safety and adapted techniques for completion of ADLs   Instruction/training on safe functional mobility/transfer techniques   Instruction/training on energy conservation/work simplification for completion of ADLs   Instruction/training on proper positioning/alignment to prevent contractures    Neuromuscular Reeducation to facilitate balance/righting reactions for increased function with ADLs tasks    Rehab Potential: Good for established goals.      Patient / Family Goal: Patient would like to return home and return to [facility] re: ADLs and IADLs        Patient and/or family were instructed on functional diagnosis, prognosis/goals and OT plan of care. Demonstrated good understanding.     Eval Complexity: Low  History: Brief review of medical records and additional review of physical, cognitive, or psychosocial history related to current functional performance  Exam: 3+ performance deficits  Assistance/Modification: Mod assistance or modifications required to perform tasks. May have comorbidities that affect occupational performance.    Time In: 9:34 AM   Time Out: 9:59 AM    Total Treatment Time: 10      Min Units   OT Eval Low 97165  X  1    OT Eval Medium 12061      OT Eval High 50487      OT Re-Eval 88514            ADL/Self Care 67414 10 1   Therapeutic Activities 65937       Therapeutic Ex 39249       Orthotic Management 82998       Manual 91907     Neuro Re-Ed 66371       Non-Billable Time        Evaluation Time additionally includes thorough review of current 
92.6 kg (204 lb 1.6 oz)   SpO2 94%   BMI 31.97 kg/m²     Input/Output:  In: -   Out: 5925     Oxygen requirements: RA    Ventilator Information:       General appearance: not in pain or distress, in no respiratory distress    HEENT: Atraumatic/normocephalic, EOMI, MARGARETH, pharynx clear, moist mucosa, redness of the uvula appreciated,   Neck: Supple, no jugular venous distension, lymphadenopathy, thyromegaly or carotid bruits  Chest: Decreased breath sounds, no wheezing, no crackles and no tenderness over ribs   Cardiovascular: Normal S1 , S2, regular rate and rhythm, no murmur, rub or gallop  Abdomen: Normal sounds present, soft, lax with no tenderness, no hepatosplenomegaly, and no masses  Extremities: No edema. Pulses are equally present.   Skin: intact, no rashes   Neurologic: Alert and oriented x 3, No focal deficit     Investigations:  Labs, radiological imaging and cardiac work up were personally reviewed and independently interpreted.        ICU STAFF PHYSICIAN NOTE OF PERSONAL INVOLVEMENT IN CARE  As the attending physician, I certify that I personally reviewed the patient's history and personally examined the patient to confirm the physical findings described above, and that I reviewed the relevant imaging studies and available reports.  I also discussed the differential diagnosis and all of the proposed management plans with the patient and individuals accompanying the patient to this visit.  They had the opportunity to ask questions about the proposed management plans and to have those questions answered.        Electronically signed by Karen Horan MD on 2/6/2025 at 2:24 PM

## 2025-02-06 NOTE — CARE COORDINATION
2/6/25 1345 CM note: respir panel (-) 1/31/25. Room air. Discharge plan is [facility]. Per [facility] liaison, they have obtained auth for pt to come to their facility. Notified Dr Saldana. Discharge order noted. NEED TERE SIGNED- notified Dr Saldana. HENS completed. [facility] liaison, arranging facility transport, states  will be after 2 or 3- she is waiting for confirmation of time. Updated pt, pts TRISTON Martínez, and charge nurse Mariya. Electronically signed by Alma Espinosa RN on 2/6/2025 at 1:58 PM

## 2025-02-06 NOTE — PLAN OF CARE
Problem: Chronic Conditions and Co-morbidities  Goal: Patient's chronic conditions and co-morbidity symptoms are monitored and maintained or improved  Outcome: Progressing  Flowsheets (Taken 2/6/2025 0742)  Care Plan - Patient's Chronic Conditions and Co-Morbidity Symptoms are Monitored and Maintained or Improved: Monitor and assess patient's chronic conditions and comorbid symptoms for stability, deterioration, or improvement     Problem: Discharge Planning  Goal: Discharge to home or other facility with appropriate resources  Outcome: Progressing  Flowsheets (Taken 2/6/2025 0742)  Discharge to home or other facility with appropriate resources: Identify barriers to discharge with patient and caregiver     Problem: Safety - Adult  Goal: Free from fall injury  Outcome: Progressing     Problem: Skin/Tissue Integrity  Goal: Skin integrity remains intact  Description: 1.  Monitor for areas of redness and/or skin breakdown  2.  Assess vascular access sites hourly  3.  Every 4-6 hours minimum:  Change oxygen saturation probe site  4.  Every 4-6 hours:  If on nasal continuous positive airway pressure, respiratory therapy assess nares and determine need for appliance change or resting period  Outcome: Progressing  Flowsheets (Taken 2/6/2025 0742)  Skin Integrity Remains Intact: Monitor for areas of redness and/or skin breakdown     Problem: Pain  Goal: Verbalizes/displays adequate comfort level or baseline comfort level  Outcome: Progressing  Flowsheets (Taken 2/6/2025 1042)  Verbalizes/displays adequate comfort level or baseline comfort level: Encourage patient to monitor pain and request assistance     Problem: ABCDS Injury Assessment  Goal: Absence of physical injury  Outcome: Progressing

## 2025-02-08 NOTE — CARE COORDINATION
2/8/25 Received a call from pt, stated dis-satisfaction with her snf placement desiring to transfer home with his sister & receive hhc. Referred pt to speak with facility SW to arrange discharge & needs. No further needs stated. Electronically signed by SABAS Nguyễn on 2/8/2025 at 3:50 PM

## 2025-02-18 ENCOUNTER — OFFICE VISIT (OUTPATIENT)
Dept: INTERNAL MEDICINE | Age: 55
End: 2025-02-18
Payer: COMMERCIAL

## 2025-02-18 VITALS
RESPIRATION RATE: 18 BRPM | DIASTOLIC BLOOD PRESSURE: 70 MMHG | BODY MASS INDEX: 31.08 KG/M2 | OXYGEN SATURATION: 95 % | HEIGHT: 67 IN | TEMPERATURE: 97.6 F | WEIGHT: 198 LBS | HEART RATE: 75 BPM | SYSTOLIC BLOOD PRESSURE: 118 MMHG

## 2025-02-18 DIAGNOSIS — Z09 HOSPITAL DISCHARGE FOLLOW-UP: Primary | ICD-10-CM

## 2025-02-18 DIAGNOSIS — F10.20 SEVERE ALCOHOL USE DISORDER (HCC): ICD-10-CM

## 2025-02-18 DIAGNOSIS — E11.10 TYPE 2 DIABETES MELLITUS WITH KETOACIDOSIS WITHOUT COMA, WITHOUT LONG-TERM CURRENT USE OF INSULIN (HCC): ICD-10-CM

## 2025-02-18 DIAGNOSIS — F33.2 SEVERE EPISODE OF RECURRENT MAJOR DEPRESSIVE DISORDER, WITHOUT PSYCHOTIC FEATURES (HCC): ICD-10-CM

## 2025-02-18 DIAGNOSIS — I10 PRIMARY HYPERTENSION: ICD-10-CM

## 2025-02-18 PROCEDURE — 99213 OFFICE O/P EST LOW 20 MIN: CPT

## 2025-02-18 RX ORDER — VALPROIC ACID 250 MG/1
250 CAPSULE ORAL 2 TIMES DAILY
Qty: 60 CAPSULE | Refills: 0 | Status: SHIPPED | OUTPATIENT
Start: 2025-02-18

## 2025-02-18 RX ORDER — FOLIC ACID 1 MG/1
1 TABLET ORAL DAILY
Qty: 30 TABLET | Refills: 0 | Status: SHIPPED | OUTPATIENT
Start: 2025-02-18

## 2025-02-18 RX ORDER — GABAPENTIN 300 MG/1
300 CAPSULE ORAL 3 TIMES DAILY
Qty: 90 CAPSULE | Refills: 0 | Status: SHIPPED | OUTPATIENT
Start: 2025-02-18 | End: 2025-03-20

## 2025-02-18 RX ORDER — PANTOPRAZOLE SODIUM 40 MG/1
40 TABLET, DELAYED RELEASE ORAL
Qty: 30 TABLET | Refills: 3 | Status: SHIPPED | OUTPATIENT
Start: 2025-02-18

## 2025-02-18 RX ORDER — OMEPRAZOLE 20 MG/1
20 CAPSULE, DELAYED RELEASE ORAL DAILY
COMMUNITY

## 2025-02-18 NOTE — PROGRESS NOTES
Post-Discharge Transitional Care  Follow Up      Jose Wesley   YOB: 1970    Date of Office Visit:  2/18/2025  Date of Hospital Admission: 1/31/25  Date of Hospital Discharge: 2/6/25  Risk of hospital readmission (high >=14%. Medium >=10%) :Readmission Risk Score: 12.6      Care management risk score Rising risk (score 2-5) and Complex Care (Scores >=6): No Risk Score On File     Non face to face  following discharge, date last encounter closed (first attempt may have been earlier): *No documented post hospital discharge outreach found in the last 14 days    Call initiated 2 business days of discharge: *No response recorded in the last 14 days    ASSESSMENT/PLAN:   Hospital discharge follow-up  Type 2 diabetes mellitus with ketoacidosis without coma, without long-term current use of insulin (HCC)  -     gabapentin (NEURONTIN) 300 MG capsule; Take 1 capsule by mouth 3 times daily for 30 days., Disp-90 capsule, R-0Normal  -     metFORMIN (GLUCOPHAGE) 1000 MG tablet; Take 1 tablet by mouth 2 times daily (with meals), Disp-180 tablet, R-1Normal  -     Lipid, Fasting; Future  -     Albumin/Creatinine Ratio, Urine; Future  -     blood glucose monitor kit and supplies; Dispense sufficient amount for indicated testing frequency plus additional to accommodate PRN testing needs. Dispense all needed supplies to include: monitor, strips, lancing device, lancets, control solutions, alcohol swabs., Disp-1 kit, R-0, Normal  Severe alcohol use disorder (HCC)  -     folic acid (FOLVITE) 1 MG tablet; Take 1 tablet by mouth daily, Disp-30 tablet, R-0Normal  -     pantoprazole (PROTONIX) 40 MG tablet; Take 1 tablet by mouth every morning (before breakfast), Disp-30 tablet, R-3Normal  Severe episode of recurrent major depressive disorder, without psychotic features (HCC)  -     valproic acid (DEPAKENE) 250 MG capsule; Take 1 capsule by mouth 2 times daily, Disp-60 capsule, R-0Normal      Medical Decision Making:

## 2025-02-18 NOTE — PATIENT INSTRUCTIONS
Dear Jose Wesley,        Thank you for coming to your appointment today. I hope we have addressed all of your needs.       Please make sure to do the following:  - Continue your medications as listed.  - Get labs drawn before our next follow up. We will call you with the results     - We will see each other again in 8 weeks    Call for a sooner appointment if you develop any worsening symptoms    Have a great day!        Sincerely,  Alena Guerrero MD  2/18/2025  2:48 PM

## 2025-02-18 NOTE — PROGRESS NOTES
[facility]  Internal Medicine Residency Clinic    Attending Physician Statement  I have discussed the case, including pertinent history and exam findings with the resident physician.I have seen and examined the patient and the key elements of the encounter have been performed by me. I agree with the assessment, plan and orders as documented by the resident. I have reviewed the relevant PMHx, PSHx, FamHx, SocialHx, medications, and allergies and updated history as appropriate.    Patient presents for hospital follow up appointment  In hospital from acute Ketoacidosis associated with alcohol usage but at time of admission ETOH was low.. Had a similar issue in 2017 and currently aligned for Alcohol outpatient counseling.  All labs reviewed and plan on hold on Lantus at this point due to low A1C values. FU 2 mons.     Remainder of medical problems as per resident note.    Marcus Cardenas, DO  2/18/2025 2:51 PM

## 2025-03-01 DIAGNOSIS — E11.10 TYPE 2 DIABETES MELLITUS WITH KETOACIDOSIS WITHOUT COMA, WITHOUT LONG-TERM CURRENT USE OF INSULIN (HCC): ICD-10-CM

## 2025-03-03 DIAGNOSIS — F33.2 SEVERE EPISODE OF RECURRENT MAJOR DEPRESSIVE DISORDER, WITHOUT PSYCHOTIC FEATURES (HCC): ICD-10-CM

## 2025-03-03 RX ORDER — VALPROIC ACID 250 MG/1
250 CAPSULE ORAL 2 TIMES DAILY
Qty: 60 CAPSULE | Refills: 0 | OUTPATIENT
Start: 2025-03-03

## 2025-03-03 RX ORDER — GABAPENTIN 300 MG/1
300 CAPSULE ORAL 3 TIMES DAILY
Qty: 90 CAPSULE | Refills: 0 | OUTPATIENT
Start: 2025-03-03 | End: 2025-04-02

## 2025-03-07 DIAGNOSIS — F33.2 SEVERE EPISODE OF RECURRENT MAJOR DEPRESSIVE DISORDER, WITHOUT PSYCHOTIC FEATURES (HCC): ICD-10-CM

## 2025-03-07 DIAGNOSIS — E11.10 TYPE 2 DIABETES MELLITUS WITH KETOACIDOSIS WITHOUT COMA, WITHOUT LONG-TERM CURRENT USE OF INSULIN (HCC): ICD-10-CM

## 2025-03-07 RX ORDER — GABAPENTIN 300 MG/1
300 CAPSULE ORAL 3 TIMES DAILY
Qty: 90 CAPSULE | Refills: 0 | Status: SHIPPED | OUTPATIENT
Start: 2025-03-07 | End: 2025-04-06

## 2025-03-07 RX ORDER — VALPROIC ACID 250 MG/1
250 CAPSULE ORAL 2 TIMES DAILY
Qty: 60 CAPSULE | Refills: 1 | Status: SHIPPED | OUTPATIENT
Start: 2025-03-07

## 2025-03-07 NOTE — TELEPHONE ENCOUNTER
Name of Medication(s) Requested:  Requested Prescriptions     Pending Prescriptions Disp Refills    gabapentin (NEURONTIN) 300 MG capsule 90 capsule 0     Sig: Take 1 capsule by mouth 3 times daily for 30 days.    valproic acid (DEPAKENE) 250 MG capsule 60 capsule 0     Sig: Take 1 capsule by mouth 2 times daily       Medication is on current medication list Yes    Dosage and directions were verified? Yes    Quantity verified: 90 day supply     Pharmacy Verified?  Yes    Last Appointment:  2/18/2025    Future appts:  Future Appointments   Date Time Provider Department Center   4/15/2025  3:00 PM Alena Guerrero MD [facility] DEP        (If no appt send self scheduling link. .REFILLAPPT)  Scheduling request sent?     [] Yes  [x] No    Does patient need updated?  [] Yes  [x] No

## 2025-06-21 ENCOUNTER — HOSPITAL ENCOUNTER (INPATIENT)
Age: 55
LOS: 4 days | Discharge: HOME | DRG: 420 | End: 2025-06-25
Attending: EMERGENCY MEDICINE | Admitting: INTERNAL MEDICINE
Payer: COMMERCIAL

## 2025-06-21 ENCOUNTER — APPOINTMENT (OUTPATIENT)
Dept: GENERAL RADIOLOGY | Age: 55
DRG: 420 | End: 2025-06-21
Payer: COMMERCIAL

## 2025-06-21 DIAGNOSIS — F10.939 ALCOHOL WITHDRAWAL SYNDROME WITH COMPLICATION (HCC): ICD-10-CM

## 2025-06-21 DIAGNOSIS — I48.91 ATRIAL FIBRILLATION WITH RVR (HCC): ICD-10-CM

## 2025-06-21 DIAGNOSIS — E87.29 ALCOHOLIC KETOACIDOSIS: Primary | ICD-10-CM

## 2025-06-21 DIAGNOSIS — F10.20 SEVERE ALCOHOL USE DISORDER (HCC): ICD-10-CM

## 2025-06-21 DIAGNOSIS — I48.91 ATRIAL FIBRILLATION, UNSPECIFIED TYPE (HCC): ICD-10-CM

## 2025-06-21 LAB
ALBUMIN SERPL-MCNC: 4.1 G/DL (ref 3.5–5.2)
ALP SERPL-CCNC: 97 U/L (ref 40–129)
ALT SERPL-CCNC: 30 U/L (ref 0–40)
AMPHET UR QL SCN: NEGATIVE
ANION GAP SERPL CALCULATED.3IONS-SCNC: 33 MMOL/L (ref 7–16)
ANION GAP SERPL CALCULATED.3IONS-SCNC: 37 MMOL/L (ref 7–16)
APAP SERPL-MCNC: <5 UG/ML (ref 10–30)
AST SERPL-CCNC: 78 U/L (ref 0–39)
B-OH-BUTYR SERPL-MCNC: >4.5 MMOL/L (ref 0.02–0.27)
BARBITURATES UR QL SCN: NEGATIVE
BASOPHILS # BLD: 0 K/UL (ref 0–0.2)
BASOPHILS NFR BLD: 0 % (ref 0–2)
BENZODIAZ UR QL: NEGATIVE
BILIRUB DIRECT SERPL-MCNC: 1 MG/DL (ref 0–0.3)
BILIRUB INDIRECT SERPL-MCNC: 0.5 MG/DL (ref 0–1)
BILIRUB SERPL-MCNC: 1.5 MG/DL (ref 0–1.2)
BNP SERPL-MCNC: 1247 PG/ML (ref 0–125)
BUN SERPL-MCNC: 34 MG/DL (ref 6–20)
BUN SERPL-MCNC: 40 MG/DL (ref 6–20)
BUPRENORPHINE UR QL: NEGATIVE
CALCIUM SERPL-MCNC: 7.6 MG/DL (ref 8.6–10)
CALCIUM SERPL-MCNC: 7.9 MG/DL (ref 8.6–10.2)
CANNABINOIDS UR QL SCN: NEGATIVE
CHLORIDE SERPL-SCNC: 87 MMOL/L (ref 98–107)
CHLORIDE SERPL-SCNC: 91 MMOL/L (ref 98–107)
CK SERPL-CCNC: 645 U/L (ref 20–200)
CO2 SERPL-SCNC: 11 MMOL/L (ref 22–29)
CO2 SERPL-SCNC: 8 MMOL/L (ref 22–29)
COCAINE UR QL SCN: NEGATIVE
CREAT SERPL-MCNC: 1.1 MG/DL (ref 0.7–1.2)
CREAT SERPL-MCNC: 1.3 MG/DL (ref 0.7–1.2)
EOSINOPHIL # BLD: 0 K/UL (ref 0.05–0.5)
EOSINOPHILS RELATIVE PERCENT: 0 % (ref 0–6)
ERYTHROCYTE [DISTWIDTH] IN BLOOD BY AUTOMATED COUNT: 12.8 % (ref 11.5–15)
ETHANOLAMINE SERPL-MCNC: <10 MG/DL (ref 0–0.08)
FENTANYL UR QL: NEGATIVE
GFR, ESTIMATED: 66 ML/MIN/1.73M2
GFR, ESTIMATED: 82 ML/MIN/1.73M2
GLUCOSE BLD-MCNC: 198 MG/DL (ref 74–99)
GLUCOSE BLD-MCNC: 223 MG/DL (ref 74–99)
GLUCOSE BLD-MCNC: 233 MG/DL (ref 74–99)
GLUCOSE BLD-MCNC: 256 MG/DL (ref 74–99)
GLUCOSE SERPL-MCNC: 201 MG/DL (ref 74–99)
GLUCOSE SERPL-MCNC: 214 MG/DL (ref 74–99)
HCT VFR BLD AUTO: 32.5 % (ref 37–54)
HGB BLD-MCNC: 11.2 G/DL (ref 12.5–16.5)
INR PPP: 1.1
LACTATE BLDV-SCNC: 2.6 MMOL/L (ref 0.5–2.2)
LIPASE SERPL-CCNC: 533 U/L (ref 13–60)
LYMPHOCYTES NFR BLD: 0.34 K/UL (ref 1.5–4)
LYMPHOCYTES RELATIVE PERCENT: 8 % (ref 20–42)
MAGNESIUM SERPL-MCNC: 1.5 MG/DL (ref 1.6–2.6)
MAGNESIUM SERPL-MCNC: 2.1 MG/DL (ref 1.6–2.6)
MCH RBC QN AUTO: 35.2 PG (ref 26–35)
MCHC RBC AUTO-ENTMCNC: 34.5 G/DL (ref 32–34.5)
MCV RBC AUTO: 102.2 FL (ref 80–99.9)
METAMYELOCYTES ABSOLUTE COUNT: 0.08 K/UL (ref 0–0.12)
METAMYELOCYTES: 2 % (ref 0–1)
METHADONE UR QL: NEGATIVE
MONOCYTES NFR BLD: 0.08 K/UL (ref 0.1–0.95)
MONOCYTES NFR BLD: 2 % (ref 2–12)
NEUTROPHILS NFR BLD: 89 % (ref 43–80)
NEUTS SEG NFR BLD: 3.81 K/UL (ref 1.8–7.3)
NUCLEATED RED BLOOD CELLS: 1 PER 100 WBC
OPIATES UR QL SCN: NEGATIVE
OXYCODONE UR QL SCN: NEGATIVE
PCP UR QL SCN: NEGATIVE
PH VENOUS: 7.3 (ref 7.35–7.45)
PHOSPHATE SERPL-MCNC: 0.6 MG/DL (ref 2.5–4.5)
PLATELET # BLD AUTO: 90 K/UL (ref 130–450)
PLATELET CONFIRMATION: NORMAL
PMV BLD AUTO: 10 FL (ref 7–12)
POTASSIUM SERPL-SCNC: 4.3 MMOL/L (ref 3.5–5.1)
POTASSIUM SERPL-SCNC: 4.8 MMOL/L (ref 3.5–5)
PROT SERPL-MCNC: 7.3 G/DL (ref 6.4–8.3)
PROTHROMBIN TIME: 12.2 SEC (ref 9.3–12.4)
RBC # BLD AUTO: 3.18 M/UL (ref 3.8–5.8)
RBC # BLD: ABNORMAL 10*6/UL
SALICYLATES SERPL-MCNC: <0.5 MG/DL (ref 0–30)
SODIUM SERPL-SCNC: 132 MMOL/L (ref 132–146)
SODIUM SERPL-SCNC: 135 MMOL/L (ref 136–145)
TEST INFORMATION: NORMAL
TOXIC TRICYCLIC SC,BLOOD: NEGATIVE
TROPONIN I SERPL HS-MCNC: 21 NG/L (ref 0–22)
TROPONIN I SERPL HS-MCNC: 33 NG/L (ref 0–22)
WBC OTHER # BLD: 4.3 K/UL (ref 4.5–11.5)

## 2025-06-21 PROCEDURE — 80179 DRUG ASSAY SALICYLATE: CPT

## 2025-06-21 PROCEDURE — 82962 GLUCOSE BLOOD TEST: CPT

## 2025-06-21 PROCEDURE — 85610 PROTHROMBIN TIME: CPT

## 2025-06-21 PROCEDURE — 93005 ELECTROCARDIOGRAM TRACING: CPT | Performed by: EMERGENCY MEDICINE

## 2025-06-21 PROCEDURE — 82248 BILIRUBIN DIRECT: CPT

## 2025-06-21 PROCEDURE — 85025 COMPLETE CBC W/AUTO DIFF WBC: CPT

## 2025-06-21 PROCEDURE — 84100 ASSAY OF PHOSPHORUS: CPT

## 2025-06-21 PROCEDURE — 6370000000 HC RX 637 (ALT 250 FOR IP): Performed by: EMERGENCY MEDICINE

## 2025-06-21 PROCEDURE — 2500000003 HC RX 250 WO HCPCS: Performed by: EMERGENCY MEDICINE

## 2025-06-21 PROCEDURE — 96374 THER/PROPH/DIAG INJ IV PUSH: CPT

## 2025-06-21 PROCEDURE — 71045 X-RAY EXAM CHEST 1 VIEW: CPT

## 2025-06-21 PROCEDURE — 80143 DRUG ASSAY ACETAMINOPHEN: CPT

## 2025-06-21 PROCEDURE — 6360000002 HC RX W HCPCS: Performed by: FAMILY MEDICINE

## 2025-06-21 PROCEDURE — 2580000003 HC RX 258

## 2025-06-21 PROCEDURE — 99291 CRITICAL CARE FIRST HOUR: CPT | Performed by: INTERNAL MEDICINE

## 2025-06-21 PROCEDURE — 2580000003 HC RX 258: Performed by: EMERGENCY MEDICINE

## 2025-06-21 PROCEDURE — 82607 VITAMIN B-12: CPT

## 2025-06-21 PROCEDURE — 82010 KETONE BODYS QUAN: CPT

## 2025-06-21 PROCEDURE — 99285 EMERGENCY DEPT VISIT HI MDM: CPT

## 2025-06-21 PROCEDURE — 80307 DRUG TEST PRSMV CHEM ANLYZR: CPT

## 2025-06-21 PROCEDURE — 83735 ASSAY OF MAGNESIUM: CPT

## 2025-06-21 PROCEDURE — 96361 HYDRATE IV INFUSION ADD-ON: CPT

## 2025-06-21 PROCEDURE — 2500000003 HC RX 250 WO HCPCS

## 2025-06-21 PROCEDURE — 83880 ASSAY OF NATRIURETIC PEPTIDE: CPT

## 2025-06-21 PROCEDURE — 80048 BASIC METABOLIC PNL TOTAL CA: CPT

## 2025-06-21 PROCEDURE — 82800 BLOOD PH: CPT

## 2025-06-21 PROCEDURE — 83036 HEMOGLOBIN GLYCOSYLATED A1C: CPT

## 2025-06-21 PROCEDURE — 6360000002 HC RX W HCPCS

## 2025-06-21 PROCEDURE — 6360000002 HC RX W HCPCS: Performed by: EMERGENCY MEDICINE

## 2025-06-21 PROCEDURE — 82550 ASSAY OF CK (CPK): CPT

## 2025-06-21 PROCEDURE — 82746 ASSAY OF FOLIC ACID SERUM: CPT

## 2025-06-21 PROCEDURE — 82306 VITAMIN D 25 HYDROXY: CPT

## 2025-06-21 PROCEDURE — 80053 COMPREHEN METABOLIC PANEL: CPT

## 2025-06-21 PROCEDURE — G0480 DRUG TEST DEF 1-7 CLASSES: HCPCS

## 2025-06-21 PROCEDURE — 99223 1ST HOSP IP/OBS HIGH 75: CPT | Performed by: FAMILY MEDICINE

## 2025-06-21 PROCEDURE — 83690 ASSAY OF LIPASE: CPT

## 2025-06-21 PROCEDURE — 84145 PROCALCITONIN (PCT): CPT

## 2025-06-21 PROCEDURE — 84484 ASSAY OF TROPONIN QUANT: CPT

## 2025-06-21 PROCEDURE — 83605 ASSAY OF LACTIC ACID: CPT

## 2025-06-21 PROCEDURE — 2000000000 HC ICU R&B

## 2025-06-21 PROCEDURE — 2500000003 HC RX 250 WO HCPCS: Performed by: FAMILY MEDICINE

## 2025-06-21 RX ORDER — SODIUM CHLORIDE 0.9 % (FLUSH) 0.9 %
5-40 SYRINGE (ML) INJECTION EVERY 12 HOURS SCHEDULED
Status: DISCONTINUED | OUTPATIENT
Start: 2025-06-21 | End: 2025-06-23

## 2025-06-21 RX ORDER — LORAZEPAM 1 MG/1
4 TABLET ORAL
Status: DISCONTINUED | OUTPATIENT
Start: 2025-06-21 | End: 2025-06-21

## 2025-06-21 RX ORDER — 0.9 % SODIUM CHLORIDE 0.9 %
1000 INTRAVENOUS SOLUTION INTRAVENOUS ONCE
Status: COMPLETED | OUTPATIENT
Start: 2025-06-21 | End: 2025-06-21

## 2025-06-21 RX ORDER — PHENOBARBITAL 32.4 MG/1
64.8 TABLET ORAL EVERY 6 HOURS PRN
Status: DISCONTINUED | OUTPATIENT
Start: 2025-06-21 | End: 2025-06-21

## 2025-06-21 RX ORDER — LORAZEPAM 1 MG/1
3 TABLET ORAL
Status: DISCONTINUED | OUTPATIENT
Start: 2025-06-21 | End: 2025-06-21

## 2025-06-21 RX ORDER — PHENOBARBITAL SODIUM 65 MG/ML
130 INJECTION, SOLUTION INTRAMUSCULAR; INTRAVENOUS ONCE
Status: COMPLETED | OUTPATIENT
Start: 2025-06-21 | End: 2025-06-21

## 2025-06-21 RX ORDER — PHENOBARBITAL SODIUM 65 MG/ML
16.2 INJECTION, SOLUTION INTRAMUSCULAR; INTRAVENOUS EVERY 12 HOURS
Status: COMPLETED | OUTPATIENT
Start: 2025-06-24 | End: 2025-06-25

## 2025-06-21 RX ORDER — PHENOBARBITAL 32.4 MG/1
16.2 TABLET ORAL 2 TIMES DAILY
Status: DISCONTINUED | OUTPATIENT
Start: 2025-06-24 | End: 2025-06-21

## 2025-06-21 RX ORDER — DEXTROSE MONOHYDRATE AND SODIUM CHLORIDE 5; .45 G/100ML; G/100ML
INJECTION, SOLUTION INTRAVENOUS CONTINUOUS PRN
Status: DISCONTINUED | OUTPATIENT
Start: 2025-06-21 | End: 2025-06-23

## 2025-06-21 RX ORDER — DIGOXIN 0.25 MG/ML
250 INJECTION INTRAMUSCULAR; INTRAVENOUS ONCE
Status: COMPLETED | OUTPATIENT
Start: 2025-06-21 | End: 2025-06-21

## 2025-06-21 RX ORDER — PHENOBARBITAL SODIUM 65 MG/ML
16.2 INJECTION, SOLUTION INTRAMUSCULAR; INTRAVENOUS EVERY 6 HOURS PRN
Status: DISCONTINUED | OUTPATIENT
Start: 2025-06-24 | End: 2025-06-25 | Stop reason: HOSPADM

## 2025-06-21 RX ORDER — DROPERIDOL 2.5 MG/ML
1.25 INJECTION, SOLUTION INTRAMUSCULAR; INTRAVENOUS ONCE
Status: COMPLETED | OUTPATIENT
Start: 2025-06-21 | End: 2025-06-21

## 2025-06-21 RX ORDER — METOPROLOL TARTRATE 1 MG/ML
5 INJECTION, SOLUTION INTRAVENOUS EVERY 6 HOURS PRN
Status: DISCONTINUED | OUTPATIENT
Start: 2025-06-21 | End: 2025-06-23

## 2025-06-21 RX ORDER — SODIUM CHLORIDE 9 MG/ML
INJECTION, SOLUTION INTRAVENOUS PRN
Status: DISCONTINUED | OUTPATIENT
Start: 2025-06-21 | End: 2025-06-21 | Stop reason: SDUPTHER

## 2025-06-21 RX ORDER — SODIUM CHLORIDE 0.9 % (FLUSH) 0.9 %
5-40 SYRINGE (ML) INJECTION PRN
Status: DISCONTINUED | OUTPATIENT
Start: 2025-06-21 | End: 2025-06-25 | Stop reason: HOSPADM

## 2025-06-21 RX ORDER — PHENOBARBITAL SODIUM 65 MG/ML
32.5 INJECTION, SOLUTION INTRAMUSCULAR; INTRAVENOUS EVERY 6 HOURS
Status: COMPLETED | OUTPATIENT
Start: 2025-06-22 | End: 2025-06-23

## 2025-06-21 RX ORDER — PHENOBARBITAL 32.4 MG/1
16.2 TABLET ORAL EVERY 6 HOURS PRN
Status: DISCONTINUED | OUTPATIENT
Start: 2025-06-24 | End: 2025-06-21

## 2025-06-21 RX ORDER — PHENOBARBITAL 32.4 MG/1
32.4 TABLET ORAL EVERY 6 HOURS PRN
Status: DISCONTINUED | OUTPATIENT
Start: 2025-06-22 | End: 2025-06-21

## 2025-06-21 RX ORDER — FOLIC ACID 1 MG/1
1 TABLET ORAL DAILY
Status: DISCONTINUED | OUTPATIENT
Start: 2025-06-22 | End: 2025-06-25 | Stop reason: HOSPADM

## 2025-06-21 RX ORDER — PHENOBARBITAL 32.4 MG/1
32.4 TABLET ORAL 2 TIMES DAILY
Status: DISCONTINUED | OUTPATIENT
Start: 2025-06-23 | End: 2025-06-21

## 2025-06-21 RX ORDER — LORAZEPAM 1 MG/1
2 TABLET ORAL
Status: DISCONTINUED | OUTPATIENT
Start: 2025-06-21 | End: 2025-06-21

## 2025-06-21 RX ORDER — DEXMEDETOMIDINE HYDROCHLORIDE 4 UG/ML
.1-1.5 INJECTION, SOLUTION INTRAVENOUS CONTINUOUS
Status: DISCONTINUED | OUTPATIENT
Start: 2025-06-21 | End: 2025-06-24

## 2025-06-21 RX ORDER — LORAZEPAM 2 MG/ML
1 INJECTION INTRAMUSCULAR
Status: DISCONTINUED | OUTPATIENT
Start: 2025-06-21 | End: 2025-06-21

## 2025-06-21 RX ORDER — MAGNESIUM SULFATE IN WATER 40 MG/ML
2000 INJECTION, SOLUTION INTRAVENOUS PRN
Status: DISCONTINUED | OUTPATIENT
Start: 2025-06-21 | End: 2025-06-23

## 2025-06-21 RX ORDER — SODIUM CHLORIDE 0.9 % (FLUSH) 0.9 %
5-40 SYRINGE (ML) INJECTION PRN
Status: DISCONTINUED | OUTPATIENT
Start: 2025-06-21 | End: 2025-06-22

## 2025-06-21 RX ORDER — ENOXAPARIN SODIUM 100 MG/ML
30 INJECTION SUBCUTANEOUS DAILY
Status: DISCONTINUED | OUTPATIENT
Start: 2025-06-21 | End: 2025-06-23

## 2025-06-21 RX ORDER — PHENOBARBITAL 32.4 MG/1
32.4 TABLET ORAL 4 TIMES DAILY
Status: DISCONTINUED | OUTPATIENT
Start: 2025-06-22 | End: 2025-06-21

## 2025-06-21 RX ORDER — PHENOBARBITAL SODIUM 65 MG/ML
65 INJECTION, SOLUTION INTRAMUSCULAR; INTRAVENOUS EVERY 6 HOURS PRN
Status: DISPENSED | OUTPATIENT
Start: 2025-06-21 | End: 2025-06-22

## 2025-06-21 RX ORDER — MAGNESIUM SULFATE IN WATER 40 MG/ML
2000 INJECTION, SOLUTION INTRAVENOUS ONCE
Status: COMPLETED | OUTPATIENT
Start: 2025-06-21 | End: 2025-06-21

## 2025-06-21 RX ORDER — PHENOBARBITAL SODIUM 65 MG/ML
65 INJECTION, SOLUTION INTRAMUSCULAR; INTRAVENOUS EVERY 6 HOURS
Status: COMPLETED | OUTPATIENT
Start: 2025-06-21 | End: 2025-06-22

## 2025-06-21 RX ORDER — PHENOBARBITAL SODIUM 65 MG/ML
32.5 INJECTION, SOLUTION INTRAMUSCULAR; INTRAVENOUS EVERY 12 HOURS
Status: COMPLETED | OUTPATIENT
Start: 2025-06-23 | End: 2025-06-24

## 2025-06-21 RX ORDER — M-VIT,TX,IRON,MINS/CALC/FOLIC 27MG-0.4MG
1 TABLET ORAL DAILY
Status: DISCONTINUED | OUTPATIENT
Start: 2025-06-22 | End: 2025-06-25 | Stop reason: HOSPADM

## 2025-06-21 RX ORDER — LORAZEPAM 2 MG/ML
4 INJECTION INTRAMUSCULAR
Status: DISCONTINUED | OUTPATIENT
Start: 2025-06-21 | End: 2025-06-21

## 2025-06-21 RX ORDER — LORAZEPAM 2 MG/ML
3 INJECTION INTRAMUSCULAR
Status: DISCONTINUED | OUTPATIENT
Start: 2025-06-21 | End: 2025-06-21

## 2025-06-21 RX ORDER — LORAZEPAM 2 MG/ML
2 INJECTION INTRAMUSCULAR
Status: DISCONTINUED | OUTPATIENT
Start: 2025-06-21 | End: 2025-06-21

## 2025-06-21 RX ORDER — PHENOBARBITAL 32.4 MG/1
64.8 TABLET ORAL 4 TIMES DAILY
Status: DISCONTINUED | OUTPATIENT
Start: 2025-06-21 | End: 2025-06-21

## 2025-06-21 RX ORDER — LORAZEPAM 1 MG/1
1 TABLET ORAL
Status: DISCONTINUED | OUTPATIENT
Start: 2025-06-21 | End: 2025-06-21

## 2025-06-21 RX ORDER — SODIUM CHLORIDE 0.9 % (FLUSH) 0.9 %
5-40 SYRINGE (ML) INJECTION EVERY 12 HOURS SCHEDULED
Status: DISCONTINUED | OUTPATIENT
Start: 2025-06-21 | End: 2025-06-25 | Stop reason: HOSPADM

## 2025-06-21 RX ORDER — M-VIT,TX,IRON,MINS/CALC/FOLIC 27MG-0.4MG
1 TABLET ORAL DAILY
Status: DISCONTINUED | OUTPATIENT
Start: 2025-06-21 | End: 2025-06-21 | Stop reason: CLARIF

## 2025-06-21 RX ORDER — PHENOBARBITAL SODIUM 65 MG/ML
32.5 INJECTION, SOLUTION INTRAMUSCULAR; INTRAVENOUS EVERY 6 HOURS PRN
Status: DISPENSED | OUTPATIENT
Start: 2025-06-22 | End: 2025-06-24

## 2025-06-21 RX ORDER — THIAMINE HYDROCHLORIDE 100 MG/ML
100 INJECTION, SOLUTION INTRAMUSCULAR; INTRAVENOUS DAILY
Status: DISCONTINUED | OUTPATIENT
Start: 2025-06-21 | End: 2025-06-23

## 2025-06-21 RX ORDER — SODIUM CHLORIDE 9 MG/ML
INJECTION, SOLUTION INTRAVENOUS CONTINUOUS
Status: DISCONTINUED | OUTPATIENT
Start: 2025-06-21 | End: 2025-06-23

## 2025-06-21 RX ORDER — POTASSIUM CHLORIDE 7.45 MG/ML
10 INJECTION INTRAVENOUS PRN
Status: DISCONTINUED | OUTPATIENT
Start: 2025-06-21 | End: 2025-06-23

## 2025-06-21 RX ORDER — FOLIC ACID 1 MG/1
1 TABLET ORAL DAILY
Status: DISCONTINUED | OUTPATIENT
Start: 2025-06-21 | End: 2025-06-21 | Stop reason: CLARIF

## 2025-06-21 RX ORDER — SODIUM CHLORIDE 9 MG/ML
INJECTION, SOLUTION INTRAVENOUS PRN
Status: DISCONTINUED | OUTPATIENT
Start: 2025-06-21 | End: 2025-06-25 | Stop reason: HOSPADM

## 2025-06-21 RX ADMIN — PANTOPRAZOLE SODIUM 40 MG: 40 INJECTION, POWDER, FOR SOLUTION INTRAVENOUS at 21:50

## 2025-06-21 RX ADMIN — SODIUM PHOSPHATE, MONOBASIC, MONOHYDRATE AND SODIUM PHOSPHATE, DIBASIC, ANHYDROUS 30 MMOL: 142; 276 INJECTION, SOLUTION INTRAVENOUS at 23:55

## 2025-06-21 RX ADMIN — ENOXAPARIN SODIUM 30 MG: 100 INJECTION SUBCUTANEOUS at 21:45

## 2025-06-21 RX ADMIN — PHENOBARBITAL SODIUM 130 MG: 65 INJECTION INTRAMUSCULAR; INTRAVENOUS at 17:06

## 2025-06-21 RX ADMIN — POTASSIUM CHLORIDE 10 MEQ: 7.46 INJECTION, SOLUTION INTRAVENOUS at 21:58

## 2025-06-21 RX ADMIN — Medication 10 ML: at 21:28

## 2025-06-21 RX ADMIN — DIGOXIN 250 MCG: 0.25 INJECTION INTRAMUSCULAR; INTRAVENOUS at 21:47

## 2025-06-21 RX ADMIN — DEXMEDETOMIDINE 0.2 MCG/KG/HR: 100 INJECTION, SOLUTION INTRAVENOUS at 22:16

## 2025-06-21 RX ADMIN — SODIUM CHLORIDE, PRESERVATIVE FREE 10 ML: 5 INJECTION INTRAVENOUS at 21:46

## 2025-06-21 RX ADMIN — DEXTROSE AND SODIUM CHLORIDE: 5; 450 INJECTION, SOLUTION INTRAVENOUS at 20:19

## 2025-06-21 RX ADMIN — MAGNESIUM SULFATE HEPTAHYDRATE 2000 MG: 40 INJECTION, SOLUTION INTRAVENOUS at 19:44

## 2025-06-21 RX ADMIN — DROPERIDOL 1.25 MG: 2.5 INJECTION, SOLUTION INTRAMUSCULAR; INTRAVENOUS at 21:42

## 2025-06-21 RX ADMIN — PHENOBARBITAL SODIUM 65 MG: 65 INJECTION INTRAMUSCULAR; INTRAVENOUS at 21:23

## 2025-06-21 RX ADMIN — POTASSIUM CHLORIDE 10 MEQ: 7.46 INJECTION, SOLUTION INTRAVENOUS at 23:46

## 2025-06-21 RX ADMIN — Medication 10 ML: at 20:24

## 2025-06-21 RX ADMIN — THIAMINE HYDROCHLORIDE 100 MG: 100 INJECTION, SOLUTION INTRAMUSCULAR; INTRAVENOUS at 19:56

## 2025-06-21 RX ADMIN — SODIUM CHLORIDE 1.4 UNITS/HR: 9 INJECTION, SOLUTION INTRAVENOUS at 20:12

## 2025-06-21 RX ADMIN — SODIUM CHLORIDE 1000 ML: 0.9 INJECTION, SOLUTION INTRAVENOUS at 16:59

## 2025-06-21 NOTE — ED PROVIDER NOTES
[facility] EMERGENCY DEPARTMENT  EMERGENCY DEPARTMENT ENCOUNTER        Pt Name: Jose Wesley  MRN: [id number]  Birthdate 1970  Date of evaluation: 6/21/2025  Provider: Brisa Trotter DO  PCP: Alena Guerrero MD  Note Started: 4:52 PM EDT 6/21/25    CHIEF COMPLAINT       Chief Complaint   Patient presents with    Leg Swelling     Bilateral LE edema from home, history of alcohol problem, patient admits to drinking today    Tachycardia     Patient's heart rate running in to 130's-160's at home, mentions        HISTORY OF PRESENT ILLNESS: 1 or more Elements   History From: patient    Limitations to history : None    Jose Wesley is a 54 y.o. male who presents with concern for alcohol abuse.  He notes that he drinks about a liters of vodka a day and he is on that every day since April.  He is a started to feel unwell over the past several days and came in for evaluation.  He says that he feels short of breath, his legs are swelling and he feels weak all over.  EMS had told nursing that the patient was just sitting in urine and fecal matter, had appeared to not have movement for a while, denies other recreational drug use, no other associations.      EXTERNAL NOTE REVIEW:      Last admitted to the ICU on 1/31/2025    REVIEW OF SYSTEMS :      Positives and Pertinent negatives as per HPI.     SURGICAL HISTORY     Past Surgical History:   Procedure Laterality Date    TONSILECTOMY, ADENOIDECTOMY, BILATERAL MYRINGOTOMY AND TUBES Bilateral     without adenoidectomy, no tubes in ear       CURRENTMEDICATIONS       Previous Medications    BLOOD GLUCOSE MONITOR KIT AND SUPPLIES    Dispense sufficient amount for indicated testing frequency plus additional to accommodate PRN testing needs. Dispense all needed supplies to include: monitor, strips, lancing device, lancets, control solutions, alcohol swabs.    BLOOD GLUCOSE MONITORING SUPPL (FREESTYLE LITE) RAFAEL    1 Device by Does not apply route

## 2025-06-21 NOTE — H&P
[facility]   History and Physical      CHIEF COMPLAINT:  abdominal pain    History of Present Illness:  54 y.o. male with a history of ETOH, type 2 DM, HTN, HLD presents with 2-3 weeks of lower extremity edema, SOB, now with abdominal pain.  EMS reported patient was found in urine and stool.  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  He was eating food up until a week ago then he quit.  He feels nauseated.  He used to be on insulin a couple of years ago, but now only takes metformin.  Workup in ED significant for CO2 8, BUN 40, creatinine 1.3, anion gap 37, Mg 1.5, lactic 2.6, glucose 214, , troponin 33, pro-BNP 1247, AST 78, ALT 30, tbili 1.5, lipase 533, beta hydroxy >4.50, ethanol <10, WBC 4.3, hgb 11.2, plt 90, venous pH 7.303.  CXR no acute finding.  EKG AFib RVR rate 150s.      Informant(s) for H&P: patient, chart    REVIEW OF SYSTEMS:  no fevers, chills, cp, ha, vision/hearing changes, wt changes, hot/cold flashes, other open skin lesions, diarrhea, constipation, dysuria/hematuria unless noted in HPI. Complete ROS performed with the patient and is otherwise negative.      PMH:  Past Medical History:   Diagnosis Date    Alcohol use disorder     Alcohol-induced acute pancreatitis 08/13/2024    Diabetes mellitus (HCC)     Diabetic retinopathy (HCC)     Hyperlipidemia     Hypertension     Hyponatremia 08/14/2024    Orthostatic hypotension 08/14/2024       Surgical History:  Past Surgical History:   Procedure Laterality Date    TONSILECTOMY, ADENOIDECTOMY, BILATERAL MYRINGOTOMY AND TUBES Bilateral     without adenoidectomy, no tubes in ear       Medications Prior to Admission:    Prior to Admission medications    Medication Sig Start Date End Date Taking? Authorizing Provider   gabapentin (NEURONTIN) 300 MG capsule Take 1 capsule by mouth 3 times daily for 30 days. 3/7/25 4/6/25  Lorna Tirado MD   valproic

## 2025-06-22 PROBLEM — K70.9 ALCOHOLIC LIVER DISEASE: Status: ACTIVE | Noted: 2025-06-22

## 2025-06-22 LAB
25(OH)D3 SERPL-MCNC: <6 NG/ML (ref 30–100)
ANION GAP SERPL CALCULATED.3IONS-SCNC: 12 MMOL/L (ref 7–16)
ANION GAP SERPL CALCULATED.3IONS-SCNC: 14 MMOL/L (ref 7–16)
ANION GAP SERPL CALCULATED.3IONS-SCNC: 17 MMOL/L (ref 7–16)
ANION GAP SERPL CALCULATED.3IONS-SCNC: 20 MMOL/L (ref 7–16)
ANION GAP SERPL CALCULATED.3IONS-SCNC: 24 MMOL/L (ref 7–16)
B-OH-BUTYR SERPL-MCNC: >4.5 MMOL/L (ref 0.02–0.27)
BASOPHILS # BLD: 0 K/UL (ref 0–0.2)
BASOPHILS NFR BLD: 0 % (ref 0–2)
BUN SERPL-MCNC: 15 MG/DL (ref 6–20)
BUN SERPL-MCNC: 18 MG/DL (ref 6–20)
BUN SERPL-MCNC: 21 MG/DL (ref 6–20)
BUN SERPL-MCNC: 24 MG/DL (ref 6–20)
BUN SERPL-MCNC: 29 MG/DL (ref 6–20)
CALCIUM SERPL-MCNC: 6.4 MG/DL (ref 8.6–10)
CALCIUM SERPL-MCNC: 6.5 MG/DL (ref 8.6–10)
CALCIUM SERPL-MCNC: 7 MG/DL (ref 8.6–10)
CHLORIDE SERPL-SCNC: 100 MMOL/L (ref 98–107)
CHLORIDE SERPL-SCNC: 95 MMOL/L (ref 98–107)
CHLORIDE SERPL-SCNC: 99 MMOL/L (ref 98–107)
CO2 SERPL-SCNC: 16 MMOL/L (ref 22–29)
CO2 SERPL-SCNC: 17 MMOL/L (ref 22–29)
CO2 SERPL-SCNC: 19 MMOL/L (ref 22–29)
CO2 SERPL-SCNC: 20 MMOL/L (ref 22–29)
CO2 SERPL-SCNC: 22 MMOL/L (ref 22–29)
CREAT SERPL-MCNC: 0.7 MG/DL (ref 0.7–1.2)
CREAT SERPL-MCNC: 0.7 MG/DL (ref 0.7–1.2)
CREAT SERPL-MCNC: 0.8 MG/DL (ref 0.7–1.2)
CREAT SERPL-MCNC: 0.8 MG/DL (ref 0.7–1.2)
CREAT SERPL-MCNC: 1 MG/DL (ref 0.7–1.2)
EKG ATRIAL RATE: 159 BPM
EKG Q-T INTERVAL: 274 MS
EKG QRS DURATION: 84 MS
EKG QTC CALCULATION (BAZETT): 442 MS
EKG R AXIS: 44 DEGREES
EKG T AXIS: -41 DEGREES
EKG VENTRICULAR RATE: 157 BPM
EOSINOPHIL # BLD: 0.03 K/UL (ref 0.05–0.5)
EOSINOPHILS RELATIVE PERCENT: 1 % (ref 0–6)
ERYTHROCYTE [DISTWIDTH] IN BLOOD BY AUTOMATED COUNT: 12.4 % (ref 11.5–15)
FOLATE SERPL-MCNC: 2.1 NG/ML (ref 4.6–34.8)
GFR, ESTIMATED: >90 ML/MIN/1.73M2
GLUCOSE BLD-MCNC: 122 MG/DL (ref 74–99)
GLUCOSE BLD-MCNC: 138 MG/DL (ref 74–99)
GLUCOSE BLD-MCNC: 151 MG/DL (ref 74–99)
GLUCOSE BLD-MCNC: 153 MG/DL (ref 74–99)
GLUCOSE BLD-MCNC: 158 MG/DL (ref 74–99)
GLUCOSE BLD-MCNC: 160 MG/DL (ref 74–99)
GLUCOSE BLD-MCNC: 161 MG/DL (ref 74–99)
GLUCOSE BLD-MCNC: 163 MG/DL (ref 74–99)
GLUCOSE BLD-MCNC: 177 MG/DL (ref 74–99)
GLUCOSE BLD-MCNC: 183 MG/DL (ref 74–99)
GLUCOSE BLD-MCNC: 184 MG/DL (ref 74–99)
GLUCOSE BLD-MCNC: 192 MG/DL (ref 74–99)
GLUCOSE BLD-MCNC: 197 MG/DL (ref 74–99)
GLUCOSE BLD-MCNC: 205 MG/DL (ref 74–99)
GLUCOSE BLD-MCNC: 212 MG/DL (ref 74–99)
GLUCOSE BLD-MCNC: 215 MG/DL (ref 74–99)
GLUCOSE BLD-MCNC: 220 MG/DL (ref 74–99)
GLUCOSE BLD-MCNC: 222 MG/DL (ref 74–99)
GLUCOSE SERPL-MCNC: 120 MG/DL (ref 74–99)
GLUCOSE SERPL-MCNC: 122 MG/DL (ref 74–99)
GLUCOSE SERPL-MCNC: 142 MG/DL (ref 74–99)
GLUCOSE SERPL-MCNC: 149 MG/DL (ref 74–99)
GLUCOSE SERPL-MCNC: 175 MG/DL (ref 74–99)
HBA1C MFR BLD: 6 % (ref 4–5.6)
HCT VFR BLD AUTO: 26.3 % (ref 37–54)
HGB BLD-MCNC: 9.5 G/DL (ref 12.5–16.5)
LACTATE BLDV-SCNC: 1.4 MMOL/L (ref 0.5–2.2)
LYMPHOCYTES NFR BLD: 0.66 K/UL (ref 1.5–4)
LYMPHOCYTES RELATIVE PERCENT: 22 % (ref 20–42)
MAGNESIUM SERPL-MCNC: 1.5 MG/DL (ref 1.6–2.6)
MAGNESIUM SERPL-MCNC: 1.8 MG/DL (ref 1.6–2.6)
MAGNESIUM SERPL-MCNC: 1.8 MG/DL (ref 1.6–2.6)
MAGNESIUM SERPL-MCNC: 2.1 MG/DL (ref 1.6–2.6)
MAGNESIUM SERPL-MCNC: 2.3 MG/DL (ref 1.6–2.6)
MCH RBC QN AUTO: 34.8 PG (ref 26–35)
MCHC RBC AUTO-ENTMCNC: 36.1 G/DL (ref 32–34.5)
MCV RBC AUTO: 96.3 FL (ref 80–99.9)
MONOCYTES NFR BLD: 0.42 K/UL (ref 0.1–0.95)
MONOCYTES NFR BLD: 14 % (ref 2–12)
NEUTROPHILS NFR BLD: 63 % (ref 43–80)
NEUTS SEG NFR BLD: 1.89 K/UL (ref 1.8–7.3)
PHOSPHATE SERPL-MCNC: 1.1 MG/DL (ref 2.5–4.5)
PHOSPHATE SERPL-MCNC: 1.2 MG/DL (ref 2.5–4.5)
PHOSPHATE SERPL-MCNC: 1.2 MG/DL (ref 2.5–4.5)
PHOSPHATE SERPL-MCNC: 1.4 MG/DL (ref 2.5–4.5)
PHOSPHATE SERPL-MCNC: 1.5 MG/DL (ref 2.5–4.5)
PLATELET # BLD AUTO: 77 K/UL (ref 130–450)
PLATELET CONFIRMATION: NORMAL
PMV BLD AUTO: 9.7 FL (ref 7–12)
POTASSIUM SERPL-SCNC: 3.6 MMOL/L (ref 3.5–5.1)
POTASSIUM SERPL-SCNC: 3.7 MMOL/L (ref 3.5–5.1)
POTASSIUM SERPL-SCNC: 4 MMOL/L (ref 3.5–5.1)
POTASSIUM SERPL-SCNC: 4.1 MMOL/L (ref 3.5–5.1)
POTASSIUM SERPL-SCNC: 4.1 MMOL/L (ref 3.5–5.1)
PROCALCITONIN SERPL-MCNC: 1.46 NG/ML (ref 0–0.08)
RBC # BLD AUTO: 2.73 M/UL (ref 3.8–5.8)
RBC # BLD: NORMAL 10*6/UL
SODIUM SERPL-SCNC: 133 MMOL/L (ref 136–145)
SODIUM SERPL-SCNC: 133 MMOL/L (ref 136–145)
SODIUM SERPL-SCNC: 134 MMOL/L (ref 136–145)
SODIUM SERPL-SCNC: 135 MMOL/L (ref 136–145)
SODIUM SERPL-SCNC: 136 MMOL/L (ref 136–145)
VIT B12 SERPL-MCNC: 682 PG/ML (ref 232–1245)
WBC OTHER # BLD: 3 K/UL (ref 4.5–11.5)

## 2025-06-22 PROCEDURE — 2580000003 HC RX 258: Performed by: EMERGENCY MEDICINE

## 2025-06-22 PROCEDURE — 84100 ASSAY OF PHOSPHORUS: CPT

## 2025-06-22 PROCEDURE — 6360000002 HC RX W HCPCS: Performed by: EMERGENCY MEDICINE

## 2025-06-22 PROCEDURE — 6360000002 HC RX W HCPCS

## 2025-06-22 PROCEDURE — 6360000002 HC RX W HCPCS: Performed by: FAMILY MEDICINE

## 2025-06-22 PROCEDURE — 2500000003 HC RX 250 WO HCPCS: Performed by: INTERNAL MEDICINE

## 2025-06-22 PROCEDURE — 2580000003 HC RX 258: Performed by: INTERNAL MEDICINE

## 2025-06-22 PROCEDURE — 83605 ASSAY OF LACTIC ACID: CPT

## 2025-06-22 PROCEDURE — 6360000002 HC RX W HCPCS: Performed by: INTERNAL MEDICINE

## 2025-06-22 PROCEDURE — 2500000003 HC RX 250 WO HCPCS: Performed by: FAMILY MEDICINE

## 2025-06-22 PROCEDURE — 80048 BASIC METABOLIC PNL TOTAL CA: CPT

## 2025-06-22 PROCEDURE — 82010 KETONE BODYS QUAN: CPT

## 2025-06-22 PROCEDURE — 82962 GLUCOSE BLOOD TEST: CPT

## 2025-06-22 PROCEDURE — 2000000000 HC ICU R&B

## 2025-06-22 PROCEDURE — 99291 CRITICAL CARE FIRST HOUR: CPT | Performed by: INTERNAL MEDICINE

## 2025-06-22 PROCEDURE — 2500000003 HC RX 250 WO HCPCS: Performed by: EMERGENCY MEDICINE

## 2025-06-22 PROCEDURE — 85025 COMPLETE CBC W/AUTO DIFF WBC: CPT

## 2025-06-22 PROCEDURE — 99232 SBSQ HOSP IP/OBS MODERATE 35: CPT | Performed by: INTERNAL MEDICINE

## 2025-06-22 PROCEDURE — 87081 CULTURE SCREEN ONLY: CPT

## 2025-06-22 PROCEDURE — 93010 ELECTROCARDIOGRAM REPORT: CPT | Performed by: INTERNAL MEDICINE

## 2025-06-22 PROCEDURE — 2500000003 HC RX 250 WO HCPCS

## 2025-06-22 PROCEDURE — 2580000003 HC RX 258

## 2025-06-22 PROCEDURE — 83735 ASSAY OF MAGNESIUM: CPT

## 2025-06-22 PROCEDURE — 6370000000 HC RX 637 (ALT 250 FOR IP)

## 2025-06-22 RX ORDER — CALCIUM GLUCONATE 20 MG/ML
2000 INJECTION, SOLUTION INTRAVENOUS ONCE
Status: COMPLETED | OUTPATIENT
Start: 2025-06-22 | End: 2025-06-23

## 2025-06-22 RX ORDER — CALCIUM GLUCONATE 20 MG/ML
1000 INJECTION, SOLUTION INTRAVENOUS ONCE
Status: COMPLETED | OUTPATIENT
Start: 2025-06-22 | End: 2025-06-22

## 2025-06-22 RX ORDER — DEXTROSE MONOHYDRATE 100 MG/ML
INJECTION, SOLUTION INTRAVENOUS CONTINUOUS PRN
Status: DISCONTINUED | OUTPATIENT
Start: 2025-06-22 | End: 2025-06-25 | Stop reason: HOSPADM

## 2025-06-22 RX ORDER — MAGNESIUM SULFATE IN WATER 40 MG/ML
2000 INJECTION, SOLUTION INTRAVENOUS ONCE
Status: COMPLETED | OUTPATIENT
Start: 2025-06-22 | End: 2025-06-22

## 2025-06-22 RX ORDER — CALCIUM GLUCONATE 20 MG/ML
1000 INJECTION, SOLUTION INTRAVENOUS ONCE
Status: COMPLETED | OUTPATIENT
Start: 2025-06-23 | End: 2025-06-23

## 2025-06-22 RX ORDER — GLUCAGON 1 MG/ML
1 KIT INJECTION PRN
Status: DISCONTINUED | OUTPATIENT
Start: 2025-06-22 | End: 2025-06-25 | Stop reason: HOSPADM

## 2025-06-22 RX ORDER — MIDAZOLAM HYDROCHLORIDE 1 MG/ML
1 INJECTION, SOLUTION INTRAMUSCULAR; INTRAVENOUS EVERY 4 HOURS PRN
Status: DISCONTINUED | OUTPATIENT
Start: 2025-06-22 | End: 2025-06-25 | Stop reason: HOSPADM

## 2025-06-22 RX ORDER — PANTOPRAZOLE SODIUM 40 MG/1
40 TABLET, DELAYED RELEASE ORAL
Status: DISCONTINUED | OUTPATIENT
Start: 2025-06-23 | End: 2025-06-25 | Stop reason: HOSPADM

## 2025-06-22 RX ORDER — MIDAZOLAM HYDROCHLORIDE 1 MG/ML
1 INJECTION, SOLUTION INTRAMUSCULAR; INTRAVENOUS EVERY 4 HOURS
Status: DISCONTINUED | OUTPATIENT
Start: 2025-06-22 | End: 2025-06-22

## 2025-06-22 RX ADMIN — POTASSIUM CHLORIDE 10 MEQ: 7.46 INJECTION, SOLUTION INTRAVENOUS at 22:11

## 2025-06-22 RX ADMIN — POTASSIUM CHLORIDE 10 MEQ: 7.46 INJECTION, SOLUTION INTRAVENOUS at 18:02

## 2025-06-22 RX ADMIN — Medication 10 ML: at 08:19

## 2025-06-22 RX ADMIN — MAGNESIUM SULFATE HEPTAHYDRATE 2000 MG: 40 INJECTION, SOLUTION INTRAVENOUS at 09:02

## 2025-06-22 RX ADMIN — DEXTROSE AND SODIUM CHLORIDE: 5; 450 INJECTION, SOLUTION INTRAVENOUS at 22:25

## 2025-06-22 RX ADMIN — PHENOBARBITAL SODIUM 32.5 MG: 65 INJECTION INTRAMUSCULAR; INTRAVENOUS at 20:59

## 2025-06-22 RX ADMIN — PHENOBARBITAL SODIUM 65 MG: 65 INJECTION INTRAMUSCULAR; INTRAVENOUS at 08:18

## 2025-06-22 RX ADMIN — Medication 1 TABLET: at 08:12

## 2025-06-22 RX ADMIN — THIAMINE HYDROCHLORIDE 100 MG: 100 INJECTION, SOLUTION INTRAMUSCULAR; INTRAVENOUS at 08:12

## 2025-06-22 RX ADMIN — CALCIUM GLUCONATE 1000 MG: 20 INJECTION, SOLUTION INTRAVENOUS at 13:26

## 2025-06-22 RX ADMIN — POTASSIUM CHLORIDE 10 MEQ: 7.46 INJECTION, SOLUTION INTRAVENOUS at 21:04

## 2025-06-22 RX ADMIN — PHENOBARBITAL SODIUM 65 MG: 65 INJECTION INTRAMUSCULAR; INTRAVENOUS at 04:49

## 2025-06-22 RX ADMIN — DEXTROSE AND SODIUM CHLORIDE: 5; 450 INJECTION, SOLUTION INTRAVENOUS at 10:35

## 2025-06-22 RX ADMIN — POTASSIUM CHLORIDE 10 MEQ: 7.46 INJECTION, SOLUTION INTRAVENOUS at 09:01

## 2025-06-22 RX ADMIN — SODIUM PHOSPHATE, MONOBASIC, MONOHYDRATE AND SODIUM PHOSPHATE, DIBASIC, ANHYDROUS 30 MMOL: 142; 276 INJECTION, SOLUTION INTRAVENOUS at 13:29

## 2025-06-22 RX ADMIN — SODIUM CHLORIDE, PRESERVATIVE FREE 10 ML: 5 INJECTION INTRAVENOUS at 08:19

## 2025-06-22 RX ADMIN — POTASSIUM CHLORIDE 10 MEQ: 7.46 INJECTION, SOLUTION INTRAVENOUS at 19:00

## 2025-06-22 RX ADMIN — SODIUM PHOSPHATE, MONOBASIC, MONOHYDRATE AND SODIUM PHOSPHATE, DIBASIC, ANHYDROUS 30 MMOL: 142; 276 INJECTION, SOLUTION INTRAVENOUS at 19:30

## 2025-06-22 RX ADMIN — PHENOBARBITAL SODIUM 32.5 MG: 65 INJECTION INTRAMUSCULAR; INTRAVENOUS at 22:17

## 2025-06-22 RX ADMIN — CALCIUM GLUCONATE 2000 MG: 20 INJECTION, SOLUTION INTRAVENOUS at 22:20

## 2025-06-22 RX ADMIN — POTASSIUM CHLORIDE 10 MEQ: 7.46 INJECTION, SOLUTION INTRAVENOUS at 14:04

## 2025-06-22 RX ADMIN — POTASSIUM CHLORIDE 10 MEQ: 7.46 INJECTION, SOLUTION INTRAVENOUS at 05:51

## 2025-06-22 RX ADMIN — DEXTROSE AND SODIUM CHLORIDE: 5; 450 INJECTION, SOLUTION INTRAVENOUS at 15:57

## 2025-06-22 RX ADMIN — POTASSIUM CHLORIDE 10 MEQ: 7.46 INJECTION, SOLUTION INTRAVENOUS at 23:12

## 2025-06-22 RX ADMIN — DEXMEDETOMIDINE 0.2 MCG/KG/HR: 100 INJECTION, SOLUTION INTRAVENOUS at 17:00

## 2025-06-22 RX ADMIN — MAGNESIUM SULFATE HEPTAHYDRATE 2000 MG: 40 INJECTION, SOLUTION INTRAVENOUS at 10:08

## 2025-06-22 RX ADMIN — SODIUM CHLORIDE, PRESERVATIVE FREE 10 ML: 5 INJECTION INTRAVENOUS at 20:59

## 2025-06-22 RX ADMIN — POTASSIUM CHLORIDE 10 MEQ: 7.46 INJECTION, SOLUTION INTRAVENOUS at 11:03

## 2025-06-22 RX ADMIN — SODIUM PHOSPHATE, MONOBASIC, MONOHYDRATE AND SODIUM PHOSPHATE, DIBASIC, ANHYDROUS 30 MMOL: 142; 276 INJECTION, SOLUTION INTRAVENOUS at 05:56

## 2025-06-22 RX ADMIN — ENOXAPARIN SODIUM 30 MG: 100 INJECTION SUBCUTANEOUS at 08:13

## 2025-06-22 RX ADMIN — PANTOPRAZOLE SODIUM 40 MG: 40 INJECTION, POWDER, FOR SOLUTION INTRAVENOUS at 08:12

## 2025-06-22 RX ADMIN — PHENOBARBITAL SODIUM 65 MG: 65 INJECTION INTRAMUSCULAR; INTRAVENOUS at 15:02

## 2025-06-22 RX ADMIN — FOLIC ACID 1 MG: 1 TABLET ORAL at 08:12

## 2025-06-22 RX ADMIN — POTASSIUM CHLORIDE 10 MEQ: 7.46 INJECTION, SOLUTION INTRAVENOUS at 06:54

## 2025-06-22 RX ADMIN — POTASSIUM CHLORIDE 10 MEQ: 7.46 INJECTION, SOLUTION INTRAVENOUS at 17:00

## 2025-06-22 RX ADMIN — POTASSIUM CHLORIDE 10 MEQ: 7.46 INJECTION, SOLUTION INTRAVENOUS at 04:48

## 2025-06-22 RX ADMIN — POTASSIUM CHLORIDE 10 MEQ: 7.46 INJECTION, SOLUTION INTRAVENOUS at 13:09

## 2025-06-22 RX ADMIN — POTASSIUM CHLORIDE 10 MEQ: 7.46 INJECTION, SOLUTION INTRAVENOUS at 14:58

## 2025-06-22 RX ADMIN — POTASSIUM CHLORIDE 10 MEQ: 7.46 INJECTION, SOLUTION INTRAVENOUS at 10:04

## 2025-06-22 NOTE — PLAN OF CARE
Problem: Chronic Conditions and Co-morbidities  Goal: Patient's chronic conditions and co-morbidity symptoms are monitored and maintained or improved  Outcome: Progressing     Problem: Discharge Planning  Goal: Discharge to home or other facility with appropriate resources  Outcome: Progressing     Problem: Seizure Precautions  Goal: Remains free of injury related to seizures activity  Outcome: Progressing     Problem: Skin/Tissue Integrity  Goal: Skin integrity remains intact  Description: 1.  Monitor for areas of redness and/or skin breakdown  2.  Assess vascular access sites hourly  3.  Every 4-6 hours minimum:  Change oxygen saturation probe site  4.  Every 4-6 hours:  If on nasal continuous positive airway pressure, respiratory therapy assess nares and determine need for appliance change or resting period  Outcome: Progressing     Problem: Safety - Adult  Goal: Free from fall injury  Outcome: Progressing     Problem: Neurosensory - Adult  Goal: Achieves stable or improved neurological status  Outcome: Progressing  Goal: Absence of seizures  Outcome: Progressing  Goal: Achieves maximal functionality and self care  Outcome: Progressing     Problem: Cardiovascular - Adult  Goal: Maintains optimal cardiac output and hemodynamic stability  Outcome: Progressing  Goal: Absence of cardiac dysrhythmias or at baseline  Outcome: Progressing     Problem: Skin/Tissue Integrity - Adult  Goal: Skin integrity remains intact  Description: 1.  Monitor for areas of redness and/or skin breakdown  2.  Assess vascular access sites hourly  3.  Every 4-6 hours minimum:  Change oxygen saturation probe site  4.  Every 4-6 hours:  If on nasal continuous positive airway pressure, respiratory therapy assess nares and determine need for appliance change or resting period  Outcome: Progressing  Goal: Oral mucous membranes remain intact  Outcome: Progressing     Problem: Musculoskeletal - Adult  Goal: Return mobility to safest level of

## 2025-06-23 ENCOUNTER — APPOINTMENT (OUTPATIENT)
Dept: ULTRASOUND IMAGING | Age: 55
DRG: 420 | End: 2025-06-23
Payer: COMMERCIAL

## 2025-06-23 LAB
ANION GAP SERPL CALCULATED.3IONS-SCNC: 10 MMOL/L (ref 7–16)
ANION GAP SERPL CALCULATED.3IONS-SCNC: 11 MMOL/L (ref 7–16)
B-OH-BUTYR SERPL-MCNC: 0.19 MMOL/L (ref 0.02–0.27)
BASOPHILS # BLD: 0.03 K/UL (ref 0–0.2)
BASOPHILS NFR BLD: 1 % (ref 0–2)
BUN SERPL-MCNC: 10 MG/DL (ref 6–20)
BUN SERPL-MCNC: 12 MG/DL (ref 6–20)
CALCIUM SERPL-MCNC: 6.7 MG/DL (ref 8.6–10)
CALCIUM SERPL-MCNC: 6.8 MG/DL (ref 8.6–10)
CHLORIDE SERPL-SCNC: 102 MMOL/L (ref 98–107)
CHLORIDE SERPL-SCNC: 104 MMOL/L (ref 98–107)
CO2 SERPL-SCNC: 21 MMOL/L (ref 22–29)
CO2 SERPL-SCNC: 21 MMOL/L (ref 22–29)
CREAT SERPL-MCNC: 0.6 MG/DL (ref 0.7–1.2)
CREAT SERPL-MCNC: 0.6 MG/DL (ref 0.7–1.2)
EOSINOPHIL # BLD: 0.1 K/UL (ref 0.05–0.5)
EOSINOPHILS RELATIVE PERCENT: 4 % (ref 0–6)
ERYTHROCYTE [DISTWIDTH] IN BLOOD BY AUTOMATED COUNT: 12 % (ref 11.5–15)
FERRITIN SERPL-MCNC: 1477 NG/ML
FIBRINOGEN PPP-MCNC: 365 MG/DL (ref 200–400)
GFR, ESTIMATED: >90 ML/MIN/1.73M2
GFR, ESTIMATED: >90 ML/MIN/1.73M2
GLUCOSE BLD-MCNC: 100 MG/DL (ref 74–99)
GLUCOSE BLD-MCNC: 140 MG/DL (ref 74–99)
GLUCOSE BLD-MCNC: 145 MG/DL (ref 74–99)
GLUCOSE BLD-MCNC: 148 MG/DL (ref 74–99)
GLUCOSE BLD-MCNC: 150 MG/DL (ref 74–99)
GLUCOSE BLD-MCNC: 154 MG/DL (ref 74–99)
GLUCOSE BLD-MCNC: 154 MG/DL (ref 74–99)
GLUCOSE BLD-MCNC: 155 MG/DL (ref 74–99)
GLUCOSE BLD-MCNC: 157 MG/DL (ref 74–99)
GLUCOSE BLD-MCNC: 158 MG/DL (ref 74–99)
GLUCOSE BLD-MCNC: 159 MG/DL (ref 74–99)
GLUCOSE BLD-MCNC: 159 MG/DL (ref 74–99)
GLUCOSE BLD-MCNC: 164 MG/DL (ref 74–99)
GLUCOSE BLD-MCNC: 165 MG/DL (ref 74–99)
GLUCOSE BLD-MCNC: 178 MG/DL (ref 74–99)
GLUCOSE BLD-MCNC: 184 MG/DL (ref 74–99)
GLUCOSE BLD-MCNC: 199 MG/DL (ref 74–99)
GLUCOSE BLD-MCNC: 207 MG/DL (ref 74–99)
GLUCOSE BLD-MCNC: 214 MG/DL (ref 74–99)
GLUCOSE BLD-MCNC: 241 MG/DL (ref 74–99)
GLUCOSE SERPL-MCNC: 144 MG/DL (ref 74–99)
GLUCOSE SERPL-MCNC: 152 MG/DL (ref 74–99)
HAPTOGLOB SERPL-MCNC: 227 MG/DL (ref 30–200)
HCT VFR BLD AUTO: 24.3 % (ref 37–54)
HGB BLD-MCNC: 8.8 G/DL (ref 12.5–16.5)
IRON SATN MFR SERPL: 41 % (ref 20–55)
IRON SERPL-MCNC: 68 UG/DL (ref 61–157)
LDH SERPL-CCNC: 164 U/L (ref 135–225)
LYMPHOCYTES NFR BLD: 0.36 K/UL (ref 1.5–4)
LYMPHOCYTES RELATIVE PERCENT: 14 % (ref 20–42)
MAGNESIUM SERPL-MCNC: 1.6 MG/DL (ref 1.6–2.6)
MAGNESIUM SERPL-MCNC: 2.4 MG/DL (ref 1.6–2.6)
MCH RBC QN AUTO: 35.1 PG (ref 26–35)
MCHC RBC AUTO-ENTMCNC: 36.2 G/DL (ref 32–34.5)
MCV RBC AUTO: 96.8 FL (ref 80–99.9)
MONOCYTES NFR BLD: 0.1 K/UL (ref 0.1–0.95)
MONOCYTES NFR BLD: 4 % (ref 2–12)
NEUTROPHILS NFR BLD: 77 % (ref 43–80)
NEUTS SEG NFR BLD: 2 K/UL (ref 1.8–7.3)
PHOSPHATE SERPL-MCNC: 2.2 MG/DL (ref 2.5–4.5)
PHOSPHATE SERPL-MCNC: 2.7 MG/DL (ref 2.5–4.5)
PLATELET CONFIRMATION: NORMAL
PLATELET, FLUORESCENCE: 82 K/UL (ref 130–450)
PMV BLD AUTO: 10.3 FL (ref 7–12)
POTASSIUM SERPL-SCNC: 3.7 MMOL/L (ref 3.5–5.1)
POTASSIUM SERPL-SCNC: 4.4 MMOL/L (ref 3.5–5.1)
RBC # BLD AUTO: 2.51 M/UL (ref 3.8–5.8)
RBC # BLD: ABNORMAL 10*6/UL
SODIUM SERPL-SCNC: 134 MMOL/L (ref 136–145)
SODIUM SERPL-SCNC: 135 MMOL/L (ref 136–145)
TIBC SERPL-MCNC: 167 UG/DL (ref 250–450)
TSH SERPL DL<=0.05 MIU/L-ACNC: 1.49 UIU/ML (ref 0.27–4.2)
WBC OTHER # BLD: 2.6 K/UL (ref 4.5–11.5)

## 2025-06-23 PROCEDURE — 85347 COAGULATION TIME ACTIVATED: CPT

## 2025-06-23 PROCEDURE — 2500000003 HC RX 250 WO HCPCS: Performed by: EMERGENCY MEDICINE

## 2025-06-23 PROCEDURE — 6370000000 HC RX 637 (ALT 250 FOR IP): Performed by: INTERNAL MEDICINE

## 2025-06-23 PROCEDURE — 97530 THERAPEUTIC ACTIVITIES: CPT

## 2025-06-23 PROCEDURE — 97161 PT EVAL LOW COMPLEX 20 MIN: CPT | Performed by: PHYSICAL THERAPIST

## 2025-06-23 PROCEDURE — 86022 PLATELET ANTIBODIES: CPT

## 2025-06-23 PROCEDURE — 6360000002 HC RX W HCPCS: Performed by: EMERGENCY MEDICINE

## 2025-06-23 PROCEDURE — 2500000003 HC RX 250 WO HCPCS: Performed by: FAMILY MEDICINE

## 2025-06-23 PROCEDURE — 99291 CRITICAL CARE FIRST HOUR: CPT | Performed by: INTERNAL MEDICINE

## 2025-06-23 PROCEDURE — 97116 GAIT TRAINING THERAPY: CPT | Performed by: PHYSICAL THERAPIST

## 2025-06-23 PROCEDURE — 99223 1ST HOSP IP/OBS HIGH 75: CPT | Performed by: INTERNAL MEDICINE

## 2025-06-23 PROCEDURE — 82728 ASSAY OF FERRITIN: CPT

## 2025-06-23 PROCEDURE — 84100 ASSAY OF PHOSPHORUS: CPT

## 2025-06-23 PROCEDURE — 83010 ASSAY OF HAPTOGLOBIN QUANT: CPT

## 2025-06-23 PROCEDURE — 99233 SBSQ HOSP IP/OBS HIGH 50: CPT | Performed by: INTERNAL MEDICINE

## 2025-06-23 PROCEDURE — 2580000003 HC RX 258: Performed by: EMERGENCY MEDICINE

## 2025-06-23 PROCEDURE — 93005 ELECTROCARDIOGRAM TRACING: CPT | Performed by: PHYSICIAN ASSISTANT

## 2025-06-23 PROCEDURE — 83735 ASSAY OF MAGNESIUM: CPT

## 2025-06-23 PROCEDURE — 6360000002 HC RX W HCPCS

## 2025-06-23 PROCEDURE — 85390 FIBRINOLYSINS SCREEN I&R: CPT

## 2025-06-23 PROCEDURE — 83540 ASSAY OF IRON: CPT

## 2025-06-23 PROCEDURE — 6370000000 HC RX 637 (ALT 250 FOR IP)

## 2025-06-23 PROCEDURE — 82962 GLUCOSE BLOOD TEST: CPT

## 2025-06-23 PROCEDURE — 84443 ASSAY THYROID STIM HORMONE: CPT

## 2025-06-23 PROCEDURE — 80048 BASIC METABOLIC PNL TOTAL CA: CPT

## 2025-06-23 PROCEDURE — 82010 KETONE BODYS QUAN: CPT

## 2025-06-23 PROCEDURE — 2500000003 HC RX 250 WO HCPCS

## 2025-06-23 PROCEDURE — 2580000003 HC RX 258

## 2025-06-23 PROCEDURE — 97165 OT EVAL LOW COMPLEX 30 MIN: CPT

## 2025-06-23 PROCEDURE — 6370000000 HC RX 637 (ALT 250 FOR IP): Performed by: PHYSICIAN ASSISTANT

## 2025-06-23 PROCEDURE — 6360000002 HC RX W HCPCS: Performed by: FAMILY MEDICINE

## 2025-06-23 PROCEDURE — 6370000000 HC RX 637 (ALT 250 FOR IP): Performed by: EMERGENCY MEDICINE

## 2025-06-23 PROCEDURE — 85025 COMPLETE CBC W/AUTO DIFF WBC: CPT

## 2025-06-23 PROCEDURE — 85576 BLOOD PLATELET AGGREGATION: CPT

## 2025-06-23 PROCEDURE — 83615 LACTATE (LD) (LDH) ENZYME: CPT

## 2025-06-23 PROCEDURE — 2000000000 HC ICU R&B

## 2025-06-23 PROCEDURE — 85384 FIBRINOGEN ACTIVITY: CPT

## 2025-06-23 PROCEDURE — 83550 IRON BINDING TEST: CPT

## 2025-06-23 RX ORDER — ERGOCALCIFEROL 1.25 MG/1
50000 CAPSULE, LIQUID FILLED ORAL WEEKLY
Status: DISCONTINUED | OUTPATIENT
Start: 2025-06-23 | End: 2025-06-25 | Stop reason: HOSPADM

## 2025-06-23 RX ORDER — INSULIN GLARGINE 100 [IU]/ML
8 INJECTION, SOLUTION SUBCUTANEOUS 2 TIMES DAILY
Status: DISCONTINUED | OUTPATIENT
Start: 2025-06-23 | End: 2025-06-25 | Stop reason: HOSPADM

## 2025-06-23 RX ORDER — BISACODYL 5 MG/1
5 TABLET, DELAYED RELEASE ORAL DAILY PRN
Status: DISCONTINUED | OUTPATIENT
Start: 2025-06-23 | End: 2025-06-25 | Stop reason: HOSPADM

## 2025-06-23 RX ORDER — INSULIN LISPRO 100 [IU]/ML
0-8 INJECTION, SOLUTION INTRAVENOUS; SUBCUTANEOUS
Status: DISCONTINUED | OUTPATIENT
Start: 2025-06-23 | End: 2025-06-25 | Stop reason: HOSPADM

## 2025-06-23 RX ORDER — METOPROLOL SUCCINATE 25 MG/1
25 TABLET, EXTENDED RELEASE ORAL 2 TIMES DAILY
Status: DISCONTINUED | OUTPATIENT
Start: 2025-06-23 | End: 2025-06-25 | Stop reason: HOSPADM

## 2025-06-23 RX ORDER — GAUZE BANDAGE 2" X 2"
100 BANDAGE TOPICAL DAILY
Status: DISCONTINUED | OUTPATIENT
Start: 2025-06-24 | End: 2025-06-25 | Stop reason: HOSPADM

## 2025-06-23 RX ORDER — CALCIUM GLUCONATE 20 MG/ML
2000 INJECTION, SOLUTION INTRAVENOUS ONCE
Status: COMPLETED | OUTPATIENT
Start: 2025-06-23 | End: 2025-06-23

## 2025-06-23 RX ORDER — POLYETHYLENE GLYCOL 3350 17 G/17G
17 POWDER, FOR SOLUTION ORAL DAILY PRN
Status: DISCONTINUED | OUTPATIENT
Start: 2025-06-23 | End: 2025-06-25 | Stop reason: HOSPADM

## 2025-06-23 RX ORDER — ENOXAPARIN SODIUM 100 MG/ML
40 INJECTION SUBCUTANEOUS DAILY
Status: DISCONTINUED | OUTPATIENT
Start: 2025-06-24 | End: 2025-06-24

## 2025-06-23 RX ADMIN — INSULIN LISPRO 2 UNITS: 100 INJECTION, SOLUTION INTRAVENOUS; SUBCUTANEOUS at 21:14

## 2025-06-23 RX ADMIN — SODIUM CHLORIDE 2.2 UNITS/HR: 9 INJECTION, SOLUTION INTRAVENOUS at 01:15

## 2025-06-23 RX ADMIN — PHENOBARBITAL SODIUM 32.5 MG: 65 INJECTION INTRAMUSCULAR; INTRAVENOUS at 04:13

## 2025-06-23 RX ADMIN — POLYETHYLENE GLYCOL 3350 17 G: 17 POWDER, FOR SOLUTION ORAL at 15:41

## 2025-06-23 RX ADMIN — INSULIN GLARGINE 8 UNITS: 100 INJECTION, SOLUTION SUBCUTANEOUS at 10:32

## 2025-06-23 RX ADMIN — SODIUM PHOSPHATE, MONOBASIC, MONOHYDRATE AND SODIUM PHOSPHATE, DIBASIC, ANHYDROUS 30 MMOL: 142; 276 INJECTION, SOLUTION INTRAVENOUS at 02:24

## 2025-06-23 RX ADMIN — MAGNESIUM SULFATE HEPTAHYDRATE 2000 MG: 40 INJECTION, SOLUTION INTRAVENOUS at 01:16

## 2025-06-23 RX ADMIN — INSULIN LISPRO 2 UNITS: 100 INJECTION, SOLUTION INTRAVENOUS; SUBCUTANEOUS at 17:01

## 2025-06-23 RX ADMIN — POTASSIUM CHLORIDE 10 MEQ: 7.46 INJECTION, SOLUTION INTRAVENOUS at 05:50

## 2025-06-23 RX ADMIN — POTASSIUM CHLORIDE 10 MEQ: 7.46 INJECTION, SOLUTION INTRAVENOUS at 01:11

## 2025-06-23 RX ADMIN — ENOXAPARIN SODIUM 30 MG: 100 INJECTION SUBCUTANEOUS at 08:57

## 2025-06-23 RX ADMIN — FOLIC ACID 1 MG: 1 TABLET ORAL at 08:57

## 2025-06-23 RX ADMIN — DEXTROSE AND SODIUM CHLORIDE: 5; 450 INJECTION, SOLUTION INTRAVENOUS at 05:14

## 2025-06-23 RX ADMIN — CALCIUM GLUCONATE 2000 MG: 20 INJECTION, SOLUTION INTRAVENOUS at 08:37

## 2025-06-23 RX ADMIN — POTASSIUM CHLORIDE 10 MEQ: 7.46 INJECTION, SOLUTION INTRAVENOUS at 03:13

## 2025-06-23 RX ADMIN — THIAMINE HYDROCHLORIDE 100 MG: 100 INJECTION, SOLUTION INTRAMUSCULAR; INTRAVENOUS at 08:57

## 2025-06-23 RX ADMIN — CALCIUM GLUCONATE 1000 MG: 20 INJECTION, SOLUTION INTRAVENOUS at 00:22

## 2025-06-23 RX ADMIN — SODIUM CHLORIDE, PRESERVATIVE FREE 10 ML: 5 INJECTION INTRAVENOUS at 08:57

## 2025-06-23 RX ADMIN — PHENOBARBITAL SODIUM 32.5 MG: 65 INJECTION INTRAMUSCULAR; INTRAVENOUS at 15:08

## 2025-06-23 RX ADMIN — BISACODYL 5 MG: 5 TABLET, COATED ORAL at 15:41

## 2025-06-23 RX ADMIN — INSULIN GLARGINE 8 UNITS: 100 INJECTION, SOLUTION SUBCUTANEOUS at 21:14

## 2025-06-23 RX ADMIN — Medication 10 ML: at 08:58

## 2025-06-23 RX ADMIN — ERGOCALCIFEROL 50000 UNITS: 1.25 CAPSULE ORAL at 09:49

## 2025-06-23 RX ADMIN — PHENOBARBITAL SODIUM 32.5 MG: 65 INJECTION INTRAMUSCULAR; INTRAVENOUS at 21:13

## 2025-06-23 RX ADMIN — DEXMEDETOMIDINE 0.2 MCG/KG/HR: 100 INJECTION, SOLUTION INTRAVENOUS at 08:33

## 2025-06-23 RX ADMIN — Medication 10 ML: at 08:57

## 2025-06-23 RX ADMIN — METOPROLOL SUCCINATE 25 MG: 25 TABLET, EXTENDED RELEASE ORAL at 21:14

## 2025-06-23 RX ADMIN — PANTOPRAZOLE SODIUM 40 MG: 40 TABLET, DELAYED RELEASE ORAL at 06:54

## 2025-06-23 RX ADMIN — Medication 1 TABLET: at 08:57

## 2025-06-23 RX ADMIN — PHENOBARBITAL SODIUM 32.5 MG: 65 INJECTION INTRAMUSCULAR; INTRAVENOUS at 08:58

## 2025-06-23 RX ADMIN — POTASSIUM CHLORIDE 10 MEQ: 7.46 INJECTION, SOLUTION INTRAVENOUS at 05:00

## 2025-06-23 RX ADMIN — POTASSIUM CHLORIDE 10 MEQ: 7.46 INJECTION, SOLUTION INTRAVENOUS at 02:12

## 2025-06-23 RX ADMIN — METOPROLOL SUCCINATE 25 MG: 25 TABLET, EXTENDED RELEASE ORAL at 13:54

## 2025-06-23 NOTE — CARE COORDINATION
Peer Recovery Support Note       Name:  Jose Wesley   Date:    6/23/2025        Chief Complaint   Patient presents with    Leg Swelling     Bilateral LE edema from home, history of alcohol problem, patient admits to drinking today    Tachycardia     Patient's heart rate running in to 130's-160's at home, mentions            Peer Support met with patient.  [x] Support and education provided  [] Resources provided   [] Treatment referral:   [] Other:   [] Patient declined peer recovery services      Referred By: Ludivina (TRISTON)     Notes: Peer met with patient. Patient was willing and eager to engage with peer recovery services. Patient shared reasoning for hospitalization. Patient shared that he is interested in receiving help to resolve his dependence on alcohol. Peer shared lived experience and offered suggestions. Peer actively listened to patient as patient shared feelings and thinking with regards to plans upon discharge. Patient stated that he was groggy and wasn't in a position to make a decision at this moment. Peer agreed and will follow up with patient later on in the day on Monday, June 23 or the morning of Tuesday, June 24. Peer shared contact information for future references.

## 2025-06-23 NOTE — CONSULTS
INPATIENT CARDIOLOGY CONSULT     Reason for Consult: New AF    Cardiologist: Dr. Cervantes    Requesting Physician: Dr. Stanford    Date of Consultation: 6/23/2025    HISTORY OF PRESENT ILLNESS:   Patient is a 54 year old WM new to [facility].    He has a past medical history as detailed below.    Admission [facility] January/February 2025 due to gait instability in setting of extensive alcohol abuse.  He was found to have alcoholic ketoacidosis and hepatitis, diabetic ketoacidosis, pancreatitis and alcohol withdrawal syndrome.  Required ICU admission with Precedex and insulin drips, as well as seizure prophylaxis.      He presented to [facility] on June 21, 2025 with complaints of abdominal pain, shortness of breath and peripheral edema bilaterally.  States the symptoms have been ongoing for the past couple of weeks.  EMS was called, he was found reportedly in urine and stool.  He notes of nausea and poor appetite.  Has been drinking large amounts of alcoholic beverages over the past several weeks, typically minimum of half a bottle of vodka daily.  States he has alcohol delivered to his home.  EKG and telemetry revealed AF RVR, with cardiology being consulted for further evaluation.  He denies complaints of chest pain, palpitations, dizziness, near-syncope or syncope.  Denies PND, orthopnea.      Please note: past medical records were reviewed per electronic medical record (EMR) - see detailed reports under Past Medical/ Surgical History.   PAST MEDICAL HISTORY:    HTN, with prior hypotension issues requiring Midodrine use  HLD, not on medical therapy  T2DM with peripheral neuropathy and retinopathy  Obesity, BMI 30  Alcohol abuse with history of multiple hospital admissions for alcohol withdrawal  History of alcoholic pancreatitis  Chronically elevated LFTs  Hypocalcemia  Chronic anemia  History of thrombocytopenia  GERD  BPH  Depression  Medical noncompliance    PAST SURGICAL HISTORY:    Past 
[facility]  Hematology/Oncology  Consult      Patient Name: Jose Wesley  YOB: 1970  PCP: Alena Guerrero MD   Referring Provider:      Reason for Consultation:   Chief Complaint   Patient presents with    Leg Swelling     Bilateral LE edema from home, history of alcohol problem, patient admits to drinking today    Tachycardia     Patient's heart rate running in to 130's-160's at home, mentions         History of Present Illness:  54-year-old man with prior history of alcoholic pancreatitis, alcoholic hepatitis, diabetic ketoacidosis, diabetes mellitus with retinopathy, hypertension.  He was hospitalized on 621 with abdominal pain, dyspnea and peripheral edema.  He has been drinking excessive amount of alcohol.  He was noted to be in atrial fibrillation with RVR.  He is seen by cardiology with recommendation for Eliquis if cleared by hematology  CMP with creatinine of 0.6.  Hepatic panel with elevation in AST and total bilirubin.  Serum lipase elevated at 533.  CBC with a hemoglobin of 8.8 with an MCV of 97 with WBC count of 2.6 and platelets of 82  Ultrasound of abdomen pending  Most recent CT abdomen had shown severe hepatic steatosis .  Folate level was low and B12 was normal.  Iron studies pending    Diagnostic Data:     Past Medical History:   Diagnosis Date    Alcohol use disorder     Alcohol-induced acute pancreatitis 08/13/2024    Diabetes mellitus (HCC)     Diabetic retinopathy (HCC)     Hyperlipidemia     Hypertension     Hyponatremia 08/14/2024    Orthostatic hypotension 08/14/2024       Patient Active Problem List    Diagnosis Date Noted    Alcoholic liver disease 06/22/2025    Atrial fibrillation with RVR (HCC) 06/21/2025    Alcohol withdrawal syndrome with complication (HCC) 06/21/2025    Elevated bilirubin 02/03/2025    Hepatitis 02/03/2025    Pancytopenia (HCC) 02/01/2025    Alcoholic hepatitis (HCC) 01/31/2025    Diabetic ketoacidosis without coma associated with type 
reports.  I also discussed the differential diagnosis and all of the proposed management plans with the patient and individuals accompanying the patient to this visit.  They had the opportunity to ask questions about the proposed management plans and to have those questions answered.    This patient has a high probability of sudden, clinically significant deterioration, which requires the highest level of physician preparedness to intervene urgently.  I managed/supervised life or organ supporting interventions that required frequent physician assessment.  I devoted my full attention to the direct care of this patient for the amount of time indicated below.  Time I spent with family or surrogate(s) is included only if the patient was incapable of providing the necessary information or participating in medical decision-making.  Time devoted to teaching and to any procedures I billed separately is not included.  Critical Care Time: 31 minutes      Electronically signed by Karen Horan MD on 6/21/2025 at 7:08 PM

## 2025-06-23 NOTE — ACP (ADVANCE CARE PLANNING)
Advance Care Planning   Healthcare Decision Maker:    Primary Decision Maker: Racquel Bernard - Brother/Sister - [phone]    Primary Decision Maker: Beatriz Bridges - Brother/Sister - [phone]    Today we documented Decision Maker(s) consistent with Legal Next of Kin hierarchy.       Electronically signed by Ludivina Gary RN-BC on 6/23/2025 at 7:26 AM

## 2025-06-23 NOTE — FLOWSHEET NOTE
Inpatient Wound Care    Admit Date: 6/21/2025  4:30 PM    Reason for consult:  skin care    Significant history:    Past Medical History:   Diagnosis Date    Alcohol use disorder     Alcohol-induced acute pancreatitis 08/13/2024    Diabetes mellitus (HCC)     Diabetic retinopathy (HCC)     Hyperlipidemia     Hypertension     Hyponatremia 08/14/2024    Orthostatic hypotension 08/14/2024           Findings:     06/23/25 1128   Wound 06/22/25 Buttocks Posterior open areas red non blanchabe with excoriation   Date First Assessed/Time First Assessed: 06/22/25 0000   Present on Original Admission: Yes  Location: Buttocks  Wound Location Orientation: Posterior  Wound Description (Comments): open areas red non blanchabe with excoriation   Wound Cleansed Cleansed with saline   Dressing/Treatment Moisture barrier   Wound Assessment Pink/red   Drainage Amount Scant (moist but unmeasurable)   Odor None         Impression:  redness to inner buttocks from stool    Interventions in place:  using protective ointmetn    Plan:  Cont with clear protective ointment       Hope Garza RN 6/23/2025 11:30 AM

## 2025-06-23 NOTE — PLAN OF CARE
Problem: Chronic Conditions and Co-morbidities  Goal: Patient's chronic conditions and co-morbidity symptoms are monitored and maintained or improved  6/23/2025 0956 by Reji Chávez RN  Outcome: Progressing  6/22/2025 2026 by Yelitza Patel RN  Outcome: Progressing     Problem: Seizure Precautions  Goal: Remains free of injury related to seizures activity  6/23/2025 0956 by Reji Chávez RN  Outcome: Progressing  6/22/2025 2026 by Yelitza Patel RN  Outcome: Progressing     Problem: Skin/Tissue Integrity  Goal: Skin integrity remains intact  Description: 1.  Monitor for areas of redness and/or skin breakdown  2.  Assess vascular access sites hourly  3.  Every 4-6 hours minimum:  Change oxygen saturation probe site  4.  Every 4-6 hours:  If on nasal continuous positive airway pressure, respiratory therapy assess nares and determine need for appliance change or resting period  6/23/2025 0956 by Reji Chávez RN  Outcome: Progressing  6/22/2025 2026 by Yelitza Patel RN  Outcome: Progressing  Flowsheets (Taken 6/22/2025 2000)  Skin Integrity Remains Intact: Monitor for areas of redness and/or skin breakdown     Problem: Safety - Adult  Goal: Free from fall injury  6/23/2025 0956 by Reji Chávez RN  Outcome: Progressing  6/22/2025 2026 by Yelitza Patel RN  Outcome: Progressing     Problem: Neurosensory - Adult  Goal: Achieves stable or improved neurological status  6/23/2025 0956 by Reji Chávez RN  Outcome: Progressing  6/22/2025 2026 by Yelitza Patel RN  Outcome: Progressing     Problem: Neurosensory - Adult  Goal: Absence of seizures  6/23/2025 0956 by Reji Chávez RN  Outcome: Progressing  6/22/2025 2026 by Yelitza Patel RN  Outcome: Progressing     Problem: Neurosensory - Adult  Goal: Achieves maximal functionality and self care  6/23/2025 0956 by Reji Chávez RN  Outcome: Progressing  6/22/2025 2026 by Yelitza Patel RN  Outcome: Progressing     Problem: Cardiovascular - Adult  Goal:

## 2025-06-23 NOTE — CARE COORDINATION
6/23/2025 ICU, Insulin gtt, Precedex gtt- pt alert and oriented. Referral to [facility] will be in to meet with pt as requested. Pt has been to [facility] in the past. He does use his [employer] for some transportation needs and he will call them for a ride home at discharge. Electronically signed by Ludivina Gary RN-BC on 6/23/2025 at 9:01 AM

## 2025-06-23 NOTE — PLAN OF CARE
Problem: Chronic Conditions and Co-morbidities  Goal: Patient's chronic conditions and co-morbidity symptoms are monitored and maintained or improved  6/22/2025 2026 by Yelitza Patel RN  Outcome: Progressing     Problem: Discharge Planning  Goal: Discharge to home or other facility with appropriate resources  6/22/2025 2026 by Yelitza Patel RN  Outcome: Progressing     Problem: Seizure Precautions  Goal: Remains free of injury related to seizures activity  6/22/2025 2026 by Yelitza Patel RN  Outcome: Progressing     Problem: Skin/Tissue Integrity  Goal: Skin integrity remains intact  Description: 1.  Monitor for areas of redness and/or skin breakdown  2.  Assess vascular access sites hourly  3.  Every 4-6 hours minimum:  Change oxygen saturation probe site  4.  Every 4-6 hours:  If on nasal continuous positive airway pressure, respiratory therapy assess nares and determine need for appliance change or resting period  6/22/2025 2026 by Yelitza Patel RN  Outcome: Progressing  Flowsheets (Taken 6/22/2025 2000)  Skin Integrity Remains Intact: Monitor for areas of redness and/or skin breakdown     Problem: Safety - Adult  Goal: Free from fall injury  6/22/2025 2026 by Yelitza Patel RN  Outcome: Progressing     Problem: Neurosensory - Adult  Goal: Achieves stable or improved neurological status  6/22/2025 2026 by Yelitza Patel RN  Outcome: Progressing    Goal: Absence of seizures  6/22/2025 2026 by Yelitza Patel RN  Outcome: Progressing  Goal: Achieves maximal functionality and self care  6/22/2025 2026 by Yelitza Patel RN  Outcome: Progressing     Problem: Cardiovascular - Adult  Goal: Maintains optimal cardiac output and hemodynamic stability  6/22/2025 2026 by Yelitza Patel RN  Outcome: Progressing  Goal: Absence of cardiac dysrhythmias or at baseline  6/22/2025 2026 by Yelitza Patel RN  Outcome: Progressing       Problem: Skin/Tissue Integrity - Adult  Goal: Skin integrity remains intact  Description: 1.

## 2025-06-24 ENCOUNTER — APPOINTMENT (OUTPATIENT)
Age: 55
DRG: 420 | End: 2025-06-24
Attending: INTERNAL MEDICINE
Payer: COMMERCIAL

## 2025-06-24 ENCOUNTER — APPOINTMENT (OUTPATIENT)
Dept: ULTRASOUND IMAGING | Age: 55
DRG: 420 | End: 2025-06-24
Payer: COMMERCIAL

## 2025-06-24 LAB
ALBUMIN SERPL-MCNC: 3 G/DL (ref 3.5–5.2)
ALP SERPL-CCNC: 76 U/L (ref 40–129)
ALT SERPL-CCNC: 22 U/L (ref 0–50)
ANION GAP SERPL CALCULATED.3IONS-SCNC: 9 MMOL/L (ref 7–16)
AST SERPL-CCNC: 56 U/L (ref 0–50)
B-OH-BUTYR SERPL-MCNC: 0.11 MMOL/L (ref 0.02–0.27)
BASOPHILS # BLD: 0.02 K/UL (ref 0–0.2)
BASOPHILS NFR BLD: 1 % (ref 0–2)
BILIRUB SERPL-MCNC: 0.7 MG/DL (ref 0–1.2)
BUN SERPL-MCNC: 6 MG/DL (ref 6–20)
CALCIUM SERPL-MCNC: 7.2 MG/DL (ref 8.6–10)
CHLORIDE SERPL-SCNC: 106 MMOL/L (ref 98–107)
CO2 SERPL-SCNC: 24 MMOL/L (ref 22–29)
CREAT SERPL-MCNC: 0.7 MG/DL (ref 0.7–1.2)
EKG ATRIAL RATE: 75 BPM
EKG P AXIS: 22 DEGREES
EKG P-R INTERVAL: 150 MS
EKG Q-T INTERVAL: 388 MS
EKG QRS DURATION: 76 MS
EKG QTC CALCULATION (BAZETT): 433 MS
EKG R AXIS: 19 DEGREES
EKG T AXIS: 35 DEGREES
EKG VENTRICULAR RATE: 75 BPM
EOSINOPHIL # BLD: 0.13 K/UL (ref 0.05–0.5)
EOSINOPHILS RELATIVE PERCENT: 4 % (ref 0–6)
ERYTHROCYTE [DISTWIDTH] IN BLOOD BY AUTOMATED COUNT: 12.2 % (ref 11.5–15)
GFR, ESTIMATED: >90 ML/MIN/1.73M2
GLUCOSE BLD-MCNC: 140 MG/DL (ref 74–99)
GLUCOSE BLD-MCNC: 146 MG/DL (ref 74–99)
GLUCOSE BLD-MCNC: 200 MG/DL (ref 74–99)
GLUCOSE SERPL-MCNC: 152 MG/DL (ref 74–99)
HCT VFR BLD AUTO: 24.8 % (ref 37–54)
HGB BLD-MCNC: 8.8 G/DL (ref 12.5–16.5)
IMM GRANULOCYTES # BLD AUTO: 0.04 K/UL (ref 0–0.58)
IMM GRANULOCYTES NFR BLD: 1 % (ref 0–5)
LYMPHOCYTES NFR BLD: 0.61 K/UL (ref 1.5–4)
LYMPHOCYTES RELATIVE PERCENT: 17 % (ref 20–42)
MAGNESIUM SERPL-MCNC: 1.5 MG/DL (ref 1.6–2.6)
MCH RBC QN AUTO: 34.5 PG (ref 26–35)
MCHC RBC AUTO-ENTMCNC: 35.5 G/DL (ref 32–34.5)
MCV RBC AUTO: 97.3 FL (ref 80–99.9)
MICROORGANISM SPEC CULT: NORMAL
MONOCYTES NFR BLD: 0.5 K/UL (ref 0.1–0.95)
MONOCYTES NFR BLD: 14 % (ref 2–12)
NEUTROPHILS NFR BLD: 63 % (ref 43–80)
NEUTS SEG NFR BLD: 2.23 K/UL (ref 1.8–7.3)
PATH REV BLD -IMP: NORMAL
PHOSPHATE SERPL-MCNC: 2.1 MG/DL (ref 2.5–4.5)
PLATELET, FLUORESCENCE: 101 K/UL (ref 130–450)
PMV BLD AUTO: 10.5 FL (ref 7–12)
POTASSIUM SERPL-SCNC: 3.8 MMOL/L (ref 3.5–5.1)
PROT SERPL-MCNC: 5.2 G/DL (ref 6.4–8.3)
RBC # BLD AUTO: 2.55 M/UL (ref 3.8–5.8)
SODIUM SERPL-SCNC: 139 MMOL/L (ref 136–145)
SPECIMEN DESCRIPTION: NORMAL
WBC OTHER # BLD: 3.5 K/UL (ref 4.5–11.5)

## 2025-06-24 PROCEDURE — 93010 ELECTROCARDIOGRAM REPORT: CPT | Performed by: INTERNAL MEDICINE

## 2025-06-24 PROCEDURE — 85025 COMPLETE CBC W/AUTO DIFF WBC: CPT

## 2025-06-24 PROCEDURE — 99232 SBSQ HOSP IP/OBS MODERATE 35: CPT | Performed by: INTERNAL MEDICINE

## 2025-06-24 PROCEDURE — 6360000004 HC RX CONTRAST MEDICATION: Performed by: INTERNAL MEDICINE

## 2025-06-24 PROCEDURE — 2500000003 HC RX 250 WO HCPCS: Performed by: FAMILY MEDICINE

## 2025-06-24 PROCEDURE — 2500000003 HC RX 250 WO HCPCS

## 2025-06-24 PROCEDURE — 1200000000 HC SEMI PRIVATE

## 2025-06-24 PROCEDURE — 83735 ASSAY OF MAGNESIUM: CPT

## 2025-06-24 PROCEDURE — 82010 KETONE BODYS QUAN: CPT

## 2025-06-24 PROCEDURE — 82962 GLUCOSE BLOOD TEST: CPT

## 2025-06-24 PROCEDURE — 80053 COMPREHEN METABOLIC PANEL: CPT

## 2025-06-24 PROCEDURE — 6360000002 HC RX W HCPCS: Performed by: INTERNAL MEDICINE

## 2025-06-24 PROCEDURE — 76705 ECHO EXAM OF ABDOMEN: CPT

## 2025-06-24 PROCEDURE — 6370000000 HC RX 637 (ALT 250 FOR IP): Performed by: PHYSICIAN ASSISTANT

## 2025-06-24 PROCEDURE — C8929 TTE W OR WO FOL WCON,DOPPLER: HCPCS

## 2025-06-24 PROCEDURE — 6360000002 HC RX W HCPCS: Performed by: FAMILY MEDICINE

## 2025-06-24 PROCEDURE — 2580000003 HC RX 258

## 2025-06-24 PROCEDURE — 6370000000 HC RX 637 (ALT 250 FOR IP): Performed by: INTERNAL MEDICINE

## 2025-06-24 PROCEDURE — 6360000002 HC RX W HCPCS

## 2025-06-24 PROCEDURE — 99233 SBSQ HOSP IP/OBS HIGH 50: CPT | Performed by: INTERNAL MEDICINE

## 2025-06-24 PROCEDURE — 84100 ASSAY OF PHOSPHORUS: CPT

## 2025-06-24 PROCEDURE — 6370000000 HC RX 637 (ALT 250 FOR IP)

## 2025-06-24 RX ORDER — MAGNESIUM SULFATE IN WATER 40 MG/ML
4000 INJECTION, SOLUTION INTRAVENOUS ONCE
Status: COMPLETED | OUTPATIENT
Start: 2025-06-24 | End: 2025-06-24

## 2025-06-24 RX ORDER — ONDANSETRON 2 MG/ML
4 INJECTION INTRAMUSCULAR; INTRAVENOUS ONCE
Status: COMPLETED | OUTPATIENT
Start: 2025-06-24 | End: 2025-06-24

## 2025-06-24 RX ADMIN — PHENOBARBITAL SODIUM 32.5 MG: 65 INJECTION INTRAMUSCULAR; INTRAVENOUS at 10:47

## 2025-06-24 RX ADMIN — FOLIC ACID 1 MG: 1 TABLET ORAL at 10:44

## 2025-06-24 RX ADMIN — PHENOBARBITAL SODIUM 16.2 MG: 65 INJECTION INTRAMUSCULAR; INTRAVENOUS at 21:02

## 2025-06-24 RX ADMIN — SULFUR HEXAFLUORIDE 2 ML: 60.7; .19; .19 INJECTION, POWDER, LYOPHILIZED, FOR SUSPENSION INTRAVENOUS; INTRAVESICAL at 16:26

## 2025-06-24 RX ADMIN — MAGNESIUM SULFATE HEPTAHYDRATE 4000 MG: 40 INJECTION, SOLUTION INTRAVENOUS at 07:24

## 2025-06-24 RX ADMIN — INSULIN LISPRO 2 UNITS: 100 INJECTION, SOLUTION INTRAVENOUS; SUBCUTANEOUS at 12:03

## 2025-06-24 RX ADMIN — Medication 1 TABLET: at 10:44

## 2025-06-24 RX ADMIN — SODIUM CHLORIDE, PRESERVATIVE FREE 10 ML: 5 INJECTION INTRAVENOUS at 21:02

## 2025-06-24 RX ADMIN — Medication 100 MG: at 10:45

## 2025-06-24 RX ADMIN — METOPROLOL SUCCINATE 25 MG: 25 TABLET, EXTENDED RELEASE ORAL at 10:45

## 2025-06-24 RX ADMIN — INSULIN GLARGINE 8 UNITS: 100 INJECTION, SOLUTION SUBCUTANEOUS at 10:45

## 2025-06-24 RX ADMIN — ENOXAPARIN SODIUM 40 MG: 100 INJECTION SUBCUTANEOUS at 10:45

## 2025-06-24 RX ADMIN — POTASSIUM PHOSPHATE, MONOBASIC AND POTASSIUM PHOSPHATE, DIBASIC 20 MMOL: 224; 236 INJECTION, SOLUTION, CONCENTRATE INTRAVENOUS at 07:32

## 2025-06-24 RX ADMIN — SODIUM CHLORIDE, PRESERVATIVE FREE 10 ML: 5 INJECTION INTRAVENOUS at 07:34

## 2025-06-24 RX ADMIN — ONDANSETRON 4 MG: 2 INJECTION, SOLUTION INTRAMUSCULAR; INTRAVENOUS at 12:03

## 2025-06-24 RX ADMIN — METOPROLOL SUCCINATE 25 MG: 25 TABLET, EXTENDED RELEASE ORAL at 21:02

## 2025-06-24 RX ADMIN — APIXABAN 5 MG: 5 TABLET, FILM COATED ORAL at 21:02

## 2025-06-24 RX ADMIN — INSULIN GLARGINE 8 UNITS: 100 INJECTION, SOLUTION SUBCUTANEOUS at 21:07

## 2025-06-24 NOTE — CARE COORDINATION
Peer Recovery Support Note       Name:  Jose Wesley   Date:    6/24/2025        Chief Complaint   Patient presents with    Leg Swelling     Bilateral LE edema from home, history of alcohol problem, patient admits to drinking today    Tachycardia     Patient's heart rate running in to 130's-160's at home, mentions            Peer Support met with patient.  [x] Support and education provided  [] Resources provided   [] Treatment referral:   [] Other:   [] Patient declined peer recovery services      Referred By: Ludivina (TRISTON)     Notes: Peer met with patient to follow up on previous days session. Patient was willing to engage with peer recovery services. Patient stated that he is still somewhat groggy and truly focused on getting physically stable so that he could potentially make the next decision with regards to his alcohol dependence. Peer offered hope and shared lived experience. Patient isn't completely sold on a specific decision upon discharge. Peer will follow up with patient on Wednesday, June 25, 2025.

## 2025-06-24 NOTE — CARE COORDINATION
6/24/2025 Transfer to [facility]. Phenobarbital taper- pt with etoh addictions. Cleared by hem/onc to start Eliquis- as cardiology requested. Dov with [facility] actively meeting with pt. He does use his [employer] for some transportation needs and he will call them for a ride home at discharge. Pt has history in [place] of IOP for addiction, he may agree to do this at discharge. Requested IMM be given to pt.  Electronically signed by Ludivina Gary RN-BC on 6/24/2025 at 10:00 AM

## 2025-06-25 VITALS
OXYGEN SATURATION: 95 % | HEIGHT: 67 IN | RESPIRATION RATE: 16 BRPM | DIASTOLIC BLOOD PRESSURE: 83 MMHG | WEIGHT: 206 LBS | HEART RATE: 80 BPM | BODY MASS INDEX: 32.33 KG/M2 | SYSTOLIC BLOOD PRESSURE: 128 MMHG | TEMPERATURE: 98.2 F

## 2025-06-25 LAB
ALBUMIN SERPL-MCNC: 3.2 G/DL (ref 3.5–5.2)
ALP SERPL-CCNC: 87 U/L (ref 40–129)
ALT SERPL-CCNC: 25 U/L (ref 0–50)
ANION GAP SERPL CALCULATED.3IONS-SCNC: 11 MMOL/L (ref 7–16)
AST SERPL-CCNC: 58 U/L (ref 0–50)
B-OH-BUTYR SERPL-MCNC: 0.5 MMOL/L (ref 0.02–0.27)
BASOPHILS # BLD: 0.07 K/UL (ref 0–0.2)
BASOPHILS NFR BLD: 1 % (ref 0–2)
BILIRUB SERPL-MCNC: 0.6 MG/DL (ref 0–1.2)
BUN SERPL-MCNC: 7 MG/DL (ref 6–20)
CALCIUM SERPL-MCNC: 7.6 MG/DL (ref 8.6–10)
CHLORIDE SERPL-SCNC: 100 MMOL/L (ref 98–107)
CO2 SERPL-SCNC: 25 MMOL/L (ref 22–29)
CREAT SERPL-MCNC: 0.7 MG/DL (ref 0.7–1.2)
ECHO AO ASC DIAM: 3.5 CM
ECHO AO ASCENDING AORTA INDEX: 1.74 CM/M2
ECHO AV AREA PEAK VELOCITY: 2.8 CM2
ECHO AV AREA VTI: 3 CM2
ECHO AV AREA/BSA PEAK VELOCITY: 1.4 CM2/M2
ECHO AV AREA/BSA VTI: 1.5 CM2/M2
ECHO AV CUSP MM: 2 CM
ECHO AV MEAN GRADIENT: 5 MMHG
ECHO AV MEAN VELOCITY: 1.1 M/S
ECHO AV PEAK GRADIENT: 9 MMHG
ECHO AV PEAK VELOCITY: 1.5 M/S
ECHO AV VELOCITY RATIO: 0.8
ECHO AV VTI: 31.4 CM
ECHO BSA: 2.04 M2
ECHO LA DIAMETER INDEX: 2.04 CM/M2
ECHO LA DIAMETER: 4.1 CM
ECHO LA VOL A-L A2C: 56 ML (ref 18–58)
ECHO LA VOL A-L A4C: 40 ML (ref 18–58)
ECHO LA VOL BP: 50 ML (ref 18–58)
ECHO LA VOL MOD A2C: 55 ML (ref 18–58)
ECHO LA VOL MOD A4C: 37 ML (ref 18–58)
ECHO LA VOL/BSA BIPLANE: 25 ML/M2 (ref 16–34)
ECHO LA VOLUME AREA LENGTH: 53 ML
ECHO LA VOLUME INDEX A-L A2C: 28 ML/M2 (ref 16–34)
ECHO LA VOLUME INDEX A-L A4C: 20 ML/M2 (ref 16–34)
ECHO LA VOLUME INDEX AREA LENGTH: 26 ML/M2 (ref 16–34)
ECHO LA VOLUME INDEX MOD A2C: 27 ML/M2 (ref 16–34)
ECHO LA VOLUME INDEX MOD A4C: 18 ML/M2 (ref 16–34)
ECHO LV EDV A2C: 106 ML
ECHO LV EDV A4C: 126 ML
ECHO LV EDV BP: 119 ML (ref 67–155)
ECHO LV EDV INDEX A4C: 63 ML/M2
ECHO LV EDV INDEX BP: 59 ML/M2
ECHO LV EDV NDEX A2C: 53 ML/M2
ECHO LV EJECTION FRACTION 3D: 60 %
ECHO LV EJECTION FRACTION A2C: 58 %
ECHO LV EJECTION FRACTION A4C: 65 %
ECHO LV EJECTION FRACTION BIPLANE: 62 % (ref 55–100)
ECHO LV ESV A2C: 45 ML
ECHO LV ESV A4C: 45 ML
ECHO LV ESV BP: 45 ML (ref 22–58)
ECHO LV ESV INDEX A2C: 22 ML/M2
ECHO LV ESV INDEX A4C: 22 ML/M2
ECHO LV ESV INDEX BP: 22 ML/M2
ECHO LV FRACTIONAL SHORTENING: 50 % (ref 28–44)
ECHO LV INTERNAL DIMENSION DIASTOLE INDEX: 1.89 CM/M2
ECHO LV INTERNAL DIMENSION DIASTOLIC: 3.8 CM (ref 4.2–5.9)
ECHO LV INTERNAL DIMENSION SYSTOLIC INDEX: 0.95 CM/M2
ECHO LV INTERNAL DIMENSION SYSTOLIC: 1.9 CM
ECHO LV ISOVOLUMETRIC RELAXATION TIME (IVRT): 91.3 MS
ECHO LV IVSD: 1 CM (ref 0.6–1)
ECHO LV MASS 2D: 125.8 G (ref 88–224)
ECHO LV MASS INDEX 2D: 62.6 G/M2 (ref 49–115)
ECHO LV POSTERIOR WALL DIASTOLIC: 1.1 CM (ref 0.6–1)
ECHO LV RELATIVE WALL THICKNESS RATIO: 0.58
ECHO LVOT AREA: 3.8 CM2
ECHO LVOT AV VTI INDEX: 0.84
ECHO LVOT DIAM: 2.2 CM
ECHO LVOT MEAN GRADIENT: 3 MMHG
ECHO LVOT PEAK GRADIENT: 5 MMHG
ECHO LVOT PEAK VELOCITY: 1.2 M/S
ECHO LVOT STROKE VOLUME INDEX: 49.7 ML/M2
ECHO LVOT SV: 99.9 ML
ECHO LVOT VTI: 26.3 CM
ECHO MV "A" WAVE DURATION: 121.8 MSEC
ECHO MV A VELOCITY: 0.74 M/S
ECHO MV AREA PHT: 4.2 CM2
ECHO MV AREA VTI: 3.6 CM2
ECHO MV E DECELERATION TIME (DT): 168 MS
ECHO MV E VELOCITY: 1.05 M/S
ECHO MV E/A RATIO: 1.42
ECHO MV LVOT VTI INDEX: 1.06
ECHO MV MAX VELOCITY: 1.1 M/S
ECHO MV MEAN GRADIENT: 2 MMHG
ECHO MV MEAN VELOCITY: 0.7 M/S
ECHO MV PEAK GRADIENT: 5 MMHG
ECHO MV PRESSURE HALF TIME (PHT): 52 MS
ECHO MV VTI: 27.9 CM
ECHO PV MAX VELOCITY: 1.1 M/S
ECHO PV MEAN GRADIENT: 2 MMHG
ECHO PV MEAN VELOCITY: 0.7 M/S
ECHO PV PEAK GRADIENT: 4 MMHG
ECHO PV VTI: 19.6 CM
ECHO PVEIN A DURATION: 95.2 MS
ECHO PVEIN A VELOCITY: 0.2 M/S
ECHO PVEIN PEAK D VELOCITY: 0.4 M/S
ECHO PVEIN PEAK S VELOCITY: 0.6 M/S
ECHO PVEIN S/D RATIO: 1.5
ECHO RV INTERNAL DIMENSION: 3.4 CM
ECHO RV LONGITUDINAL DIMENSION: 9.9 CM
ECHO RV MID DIMENSION: 3.5 CM
EOSINOPHIL # BLD: 0.15 K/UL (ref 0.05–0.5)
EOSINOPHILS RELATIVE PERCENT: 2 % (ref 0–6)
ERYTHROCYTE [DISTWIDTH] IN BLOOD BY AUTOMATED COUNT: 12.6 % (ref 11.5–15)
GFR, ESTIMATED: >90 ML/MIN/1.73M2
GLUCOSE BLD-MCNC: 120 MG/DL (ref 74–99)
GLUCOSE BLD-MCNC: 181 MG/DL (ref 74–99)
GLUCOSE SERPL-MCNC: 117 MG/DL (ref 74–99)
HCT VFR BLD AUTO: 27.4 % (ref 37–54)
HGB BLD-MCNC: 9.5 G/DL (ref 12.5–16.5)
IMM GRANULOCYTES # BLD AUTO: 0.19 K/UL (ref 0–0.58)
IMM GRANULOCYTES NFR BLD: 3 % (ref 0–5)
LYMPHOCYTES NFR BLD: 1.21 K/UL (ref 1.5–4)
LYMPHOCYTES RELATIVE PERCENT: 19 % (ref 20–42)
MAGNESIUM SERPL-MCNC: 1.6 MG/DL (ref 1.6–2.6)
MCH RBC QN AUTO: 34.9 PG (ref 26–35)
MCHC RBC AUTO-ENTMCNC: 34.7 G/DL (ref 32–34.5)
MCV RBC AUTO: 100.7 FL (ref 80–99.9)
MONOCYTES NFR BLD: 1.04 K/UL (ref 0.1–0.95)
MONOCYTES NFR BLD: 16 % (ref 2–12)
NEUTROPHILS NFR BLD: 58 % (ref 43–80)
NEUTS SEG NFR BLD: 3.73 K/UL (ref 1.8–7.3)
PF4 HEPARIN CMPLX IGG SERPL IA: 0.18 O.D. (ref 0–0.4)
PHOSPHATE SERPL-MCNC: 2.4 MG/DL (ref 2.5–4.5)
PLATELET # BLD AUTO: 142 K/UL (ref 130–450)
PMV BLD AUTO: 10.3 FL (ref 7–12)
POTASSIUM SERPL-SCNC: 4.3 MMOL/L (ref 3.5–5.1)
PROT SERPL-MCNC: 5.7 G/DL (ref 6.4–8.3)
RBC # BLD AUTO: 2.72 M/UL (ref 3.8–5.8)
SODIUM SERPL-SCNC: 136 MMOL/L (ref 136–145)
WBC OTHER # BLD: 6.4 K/UL (ref 4.5–11.5)

## 2025-06-25 PROCEDURE — 97535 SELF CARE MNGMENT TRAINING: CPT

## 2025-06-25 PROCEDURE — 6370000000 HC RX 637 (ALT 250 FOR IP): Performed by: INTERNAL MEDICINE

## 2025-06-25 PROCEDURE — 99233 SBSQ HOSP IP/OBS HIGH 50: CPT | Performed by: INTERNAL MEDICINE

## 2025-06-25 PROCEDURE — 82010 KETONE BODYS QUAN: CPT

## 2025-06-25 PROCEDURE — 6370000000 HC RX 637 (ALT 250 FOR IP)

## 2025-06-25 PROCEDURE — 83735 ASSAY OF MAGNESIUM: CPT

## 2025-06-25 PROCEDURE — 85025 COMPLETE CBC W/AUTO DIFF WBC: CPT

## 2025-06-25 PROCEDURE — 82962 GLUCOSE BLOOD TEST: CPT

## 2025-06-25 PROCEDURE — 6360000002 HC RX W HCPCS: Performed by: INTERNAL MEDICINE

## 2025-06-25 PROCEDURE — 6360000002 HC RX W HCPCS: Performed by: FAMILY MEDICINE

## 2025-06-25 PROCEDURE — 97530 THERAPEUTIC ACTIVITIES: CPT

## 2025-06-25 PROCEDURE — 84100 ASSAY OF PHOSPHORUS: CPT

## 2025-06-25 PROCEDURE — 2500000003 HC RX 250 WO HCPCS: Performed by: FAMILY MEDICINE

## 2025-06-25 PROCEDURE — 6370000000 HC RX 637 (ALT 250 FOR IP): Performed by: PHYSICIAN ASSISTANT

## 2025-06-25 PROCEDURE — 80053 COMPREHEN METABOLIC PANEL: CPT

## 2025-06-25 PROCEDURE — 99239 HOSP IP/OBS DSCHRG MGMT >30: CPT | Performed by: INTERNAL MEDICINE

## 2025-06-25 PROCEDURE — 93306 TTE W/DOPPLER COMPLETE: CPT | Performed by: INTERNAL MEDICINE

## 2025-06-25 PROCEDURE — 36415 COLL VENOUS BLD VENIPUNCTURE: CPT

## 2025-06-25 RX ORDER — MAGNESIUM SULFATE 1 G/100ML
1000 INJECTION INTRAVENOUS ONCE
Status: COMPLETED | OUTPATIENT
Start: 2025-06-25 | End: 2025-06-25

## 2025-06-25 RX ORDER — INSULIN LISPRO 100 [IU]/ML
INJECTION, SOLUTION INTRAVENOUS; SUBCUTANEOUS
Qty: 2 ADJUSTABLE DOSE PRE-FILLED PEN SYRINGE | Refills: 1 | Status: SHIPPED | OUTPATIENT
Start: 2025-06-25

## 2025-06-25 RX ORDER — BISACODYL 5 MG/1
5 TABLET, DELAYED RELEASE ORAL DAILY PRN
Qty: 30 TABLET | Refills: 1 | Status: SHIPPED | OUTPATIENT
Start: 2025-06-25

## 2025-06-25 RX ORDER — METOPROLOL SUCCINATE 25 MG/1
25 TABLET, EXTENDED RELEASE ORAL 2 TIMES DAILY
Qty: 60 TABLET | Refills: 1 | Status: SHIPPED | OUTPATIENT
Start: 2025-06-25

## 2025-06-25 RX ORDER — FOLIC ACID 1 MG/1
1 TABLET ORAL DAILY
Qty: 30 TABLET | Refills: 1 | Status: SHIPPED | OUTPATIENT
Start: 2025-06-25

## 2025-06-25 RX ORDER — THIAMINE MONONITRATE (VIT B1) 100 MG
100 TABLET ORAL DAILY
Qty: 30 TABLET | Refills: 1 | Status: SHIPPED | OUTPATIENT
Start: 2025-06-25

## 2025-06-25 RX ORDER — PANTOPRAZOLE SODIUM 40 MG/1
40 TABLET, DELAYED RELEASE ORAL
Qty: 30 TABLET | Refills: 1 | Status: SHIPPED | OUTPATIENT
Start: 2025-06-25

## 2025-06-25 RX ORDER — ERGOCALCIFEROL 1.25 MG/1
50000 CAPSULE ORAL WEEKLY
Qty: 5 CAPSULE | Refills: 1 | Status: SHIPPED | OUTPATIENT
Start: 2025-06-30

## 2025-06-25 RX ADMIN — PHENOBARBITAL SODIUM 16.2 MG: 65 INJECTION INTRAMUSCULAR; INTRAVENOUS at 04:35

## 2025-06-25 RX ADMIN — INSULIN GLARGINE 8 UNITS: 100 INJECTION, SOLUTION SUBCUTANEOUS at 08:33

## 2025-06-25 RX ADMIN — PANTOPRAZOLE SODIUM 40 MG: 40 TABLET, DELAYED RELEASE ORAL at 06:58

## 2025-06-25 RX ADMIN — INSULIN LISPRO 2 UNITS: 100 INJECTION, SOLUTION INTRAVENOUS; SUBCUTANEOUS at 12:20

## 2025-06-25 RX ADMIN — APIXABAN 5 MG: 5 TABLET, FILM COATED ORAL at 08:26

## 2025-06-25 RX ADMIN — PHENOBARBITAL SODIUM 16.2 MG: 65 INJECTION INTRAMUSCULAR; INTRAVENOUS at 08:26

## 2025-06-25 RX ADMIN — METOPROLOL SUCCINATE 25 MG: 25 TABLET, EXTENDED RELEASE ORAL at 08:26

## 2025-06-25 RX ADMIN — Medication 100 MG: at 08:25

## 2025-06-25 RX ADMIN — SODIUM CHLORIDE, PRESERVATIVE FREE 10 ML: 5 INJECTION INTRAVENOUS at 08:33

## 2025-06-25 RX ADMIN — MAGNESIUM SULFATE HEPTAHYDRATE 1000 MG: 1 INJECTION, SOLUTION INTRAVENOUS at 12:29

## 2025-06-25 RX ADMIN — Medication 1 TABLET: at 08:26

## 2025-06-25 RX ADMIN — FOLIC ACID 1 MG: 1 TABLET ORAL at 08:26

## 2025-06-25 NOTE — PLAN OF CARE
Problem: Chronic Conditions and Co-morbidities  Goal: Patient's chronic conditions and co-morbidity symptoms are monitored and maintained or improved  Outcome: Progressing     Problem: Discharge Planning  Goal: Discharge to home or other facility with appropriate resources  Outcome: Progressing     Problem: Seizure Precautions  Goal: Remains free of injury related to seizures activity  Outcome: Progressing     Problem: Skin/Tissue Integrity  Goal: Skin integrity remains intact  Description: 1.  Monitor for areas of redness and/or skin breakdown  2.  Assess vascular access sites hourly  3.  Every 4-6 hours minimum:  Change oxygen saturation probe site  4.  Every 4-6 hours:  If on nasal continuous positive airway pressure, respiratory therapy assess nares and determine need for appliance change or resting period  Outcome: Progressing     Problem: Safety - Adult  Goal: Free from fall injury  Outcome: Progressing     Problem: Neurosensory - Adult  Goal: Achieves stable or improved neurological status  Outcome: Progressing     Problem: ABCDS Injury Assessment  Goal: Absence of physical injury  Outcome: Progressing     Problem: Drug Abuse/Detox  Goal: Will have no detox symptoms and will verbalize plan for changing drug-related behavior  Description: INTERVENTIONS:  1. Administer medication as ordered  2. Monitor physical status  3. Provide emotional support with 1:1 interaction with staff  4. Encourage  recovery focused treatment   Outcome: Progressing     Problem: Metabolic/Fluid and Electrolytes - Adult  Goal: Glucose maintained within prescribed range  Outcome: Progressing     Problem: Metabolic/Fluid and Electrolytes - Adult  Goal: Hemodynamic stability and optimal renal function maintained  Outcome: Progressing     Problem: Metabolic/Fluid and Electrolytes - Adult  Goal: Electrolytes maintained within normal limits  Outcome: Progressing     Problem: Genitourinary - Adult  Goal: Absence of urinary

## 2025-06-25 NOTE — CARE COORDINATION
Ss note: 6/25/20252:02 PM notified by nursing pt needs ww at discharge, referral to Brittany basilio [facility] via [facility], will await delivery to bedside. Plan is home, pt to call his insurance company to transport home. SABAS Shaw

## 2025-06-25 NOTE — CARE COORDINATION
Peer Recovery Support Note       Name:  Jose Wesley   Date:    6/25/2025        Chief Complaint   Patient presents with    Leg Swelling     Bilateral LE edema from home, history of alcohol problem, patient admits to drinking today    Tachycardia     Patient's heart rate running in to 130's-160's at home, mentions            Peer Support met with patient.  [x] Support and education provided  [x] Resources provided   [] Treatment referral:   [] Other:   [] Patient declined peer recovery services      Referred By: Ludivina (TRISTON)     Notes:  Peer met with patient. Patient was eager to meet with peer. Patient initially thanked peer for services provided during his stay. Patient feels at this time that he would like to discharge home. Patient isn't interested in an inpatient treatment program at this time based on previous experience. Patient feels individual counseling will suffice at this time. Peer attempted to suggest places that provides the services patient desires. Patient states that he would go home and contact peer if he needed assistance. Peer offered lived experience, offered encouragement, and provided contact information for future reference.

## 2025-06-25 NOTE — DISCHARGE INSTRUCTIONS
Your information:  Name: Jose Wesley  : 1970    Your instructions:    YOU ARE BEING DISCHARGED HOME. PLEASE MAKE AND KEEP YOUR FOLLOW UP APPOINTMENTS.IF YOU EXPERIENCE ANY OF THE FOLLOWING SYMPTOMS, CHEST PAIN, SHORTNESS OF BREATH, COUGHING UP BLOOD OR BLOODY SPUTUM, STOMACH PAIN OR CRAMPING, DARK, TARRY STOOLS, LOSS OF APPETITE, GENERAL NOT FEELING WELL, SIGNS AND SYMPTOMS OF INFECTION LIKE FEVER AND OR CHILLS, PLEASE CALL THE DOCTOR OR RETURN TO THE EMERGENCY ROOM.    What to do after you leave the hospital:    Recommended diet: diabetic diet    Recommended activity: activity as tolerated        The following personal items were collected during your admission and were returned to you:    Belongings  Dental Appliances: None  Vision - Corrective Lenses: None  Hearing Aid: None  Clothing: Jacket/Coat, Footwear, Shorts, Shirt  Jewelry: None  Electronic Devices: Cell Phone, Ear Phones/Buds,  (phone, chargers x 2)  Weapons (Notify Protective Services/Security): None  Other Valuables: Wallet, Credit/Debit Card, Keys (check book)  Home Medications: Other (Comment)  Valuables Given To: Patient (pill box with pills)  Provide Name(s) of Who Valuable(s) Were Given To: self    Information obtained by:  By signing below, I understand that if any problems occur once I leave the hospital I am to contact the doctor.  I understand and acknowledge receipt of the instructions indicated above.

## 2025-06-25 NOTE — CARE COORDINATION
6/25/2025 1420 CM note: Eliquis rx noted.  Eliquis teaching initiated and written eliquis information provided/reviewed. [facility] outpt pharmacy will deliver free 30 day trial to pt prior to d/c. Pt voiced understanding and gratitude. Chip GOLDSTEIN

## 2025-06-25 NOTE — CARE COORDINATION
Ss note: 6/25/202510:33 AM discharge order noted. Dov with [facility] met with pt again this am, pt does not want inpt rehab and relays he will discharge home. Follow up visit to room, awaiting Eliquis to be delivered from meds to beds. Pt will call his insurance to transport him home. Charge nurse aware. SABAS Shaw

## 2025-06-25 NOTE — PLAN OF CARE
Problem: Chronic Conditions and Co-morbidities  Goal: Patient's chronic conditions and co-morbidity symptoms are monitored and maintained or improved  6/25/2025 1107 by Deborah Lemos RN  Outcome: Progressing  6/25/2025 0149 by Radha Sommer RN  Outcome: Progressing     Problem: Discharge Planning  Goal: Discharge to home or other facility with appropriate resources  6/25/2025 1107 by Deborah Lemos RN  Outcome: Progressing  6/25/2025 0149 by Radha Sommer RN  Outcome: Progressing     Problem: Seizure Precautions  Goal: Remains free of injury related to seizures activity  6/25/2025 1107 by Deborah Lemos RN  Outcome: Progressing  6/25/2025 0149 by Radha Sommer RN  Outcome: Progressing

## 2025-06-25 NOTE — DISCHARGE SUMMARY
[facility] Hospitalist       Hospitalist Physician Discharge Summary       Alena Guerreor MD  [address] [address] [facility] [address]  [phone]    Follow up in 1 week(s)      Jean Cervantes MD  [address]  [address]  [place] [address]  [phone]    Follow up in 2 week(s)      alcohol rehab    Follow up in 1 week(s)        Activity level: ambulating with walker     Diet: ADULT DIET; Regular; 5 carb choices (75 gm/meal)    Condition at discharge: stable     Dispo:home with home health       Patient ID:  Jose Wesley  [id number]  54 y.o.  1970    Admit date: 6/21/2025    Discharge date and time:  6/25/2025  1:39 PM    Admission Diagnoses: Principal Problem:    Alcoholic ketoacidosis  Active Problems:    Alcohol-induced acute pancreatitis without infection or necrosis    Severe alcohol use disorder (HCC)    Diabetes mellitus (HCC)    Diabetic ketoacidosis without coma associated with type 2 diabetes mellitus (HCC)    Pancytopenia (HCC)    Atrial fibrillation with RVR (HCC)    Alcohol withdrawal syndrome with complication (HCC)    Alcoholic liver disease  Resolved Problems:    * No resolved hospital problems. *      Discharge Diagnoses: Principal Problem:    Alcoholic ketoacidosis  Active Problems:    Alcohol-induced acute pancreatitis without infection or necrosis    Severe alcohol use disorder (HCC)    Diabetes mellitus (HCC)    Diabetic ketoacidosis without coma associated with type 2 diabetes mellitus (HCC)    Pancytopenia (HCC)    Atrial fibrillation with RVR (HCC)    Alcohol withdrawal syndrome with complication (HCC)    Alcoholic liver disease  Resolved Problems:    * No resolved hospital problems. *      Consults:  IP CONSULT TO SOCIAL WORK  IP CONSULT TO CRITICAL CARE  IP CONSULT TO CARDIOLOGY  IP CONSULT TO ONCOLOGY    Procedures: none     Hospital Course: This is a 54 years old white gentleman significant past medical history of diabetes, hypertension,

## 2025-06-26 NOTE — PROGRESS NOTES
Critical Care Team - Daily Progress Note         Date and time: 6/23/2025 9:17 AM  Patient's name:  Jose Wesley  Medical Record Number: [id number]  Patient's account/billing number: [id number]  Patient's YOB: 1970  Age: 54 y.o.  Date of Admission: 6/21/2025  4:30 PM  Length of stay during current admission: 2      Primary Care Physician: Alena Guerrero MD  ICU Attending Physician:      Code Status: Full Code    Reason for ICU admission: Ketoacidosis (Alcoholic, Diabetic)     SUBJECTIVE:     Patient is a 54-year-old man with above-mentioned medical problem who presented with bilateral lower extremity swelling, shortness of breath, and intermittent agitation. Patient reportedly consumes a half bottle of vodka daily. He was previously admitted for DKA and alcoholic ketoacidosis. Beta-hydroxybutyrate was elevated with elevated blood sugar and patient was started on insulin drip. Initial EKG showed atrial fibrillation with RVR.     - 6/23/2025: insulin drip remains, remains on precedex     CURRENT VENTILATION STATUS:     [] Ventilator  [] BIPAP  [] Nasal Cannula [x] Room Air      IF INTUBATED, ET TUBE MARKING AT LOWER LIP:       cms    SECRETIONS Amount:  [] Small [] Moderate  [] Large  [x] None  Color:     [] White [] Colored  [] Bloody    SEDATION:  RAAS Score:  [] Propofol gtt  [] Versed gtt  [] Ativan gtt   [x] No Sedation    PARALYZED:  [x] No    [] Yes    VASOPRESSORS:  [x] No    [] Yes    If yes -   [] Levophed       [] Dopamine     [] Vasopressin       [] Dobutamine  [] Phenylephrine         [] Epinephrine    CENTRAL LINES:     [x] No   [] Yes   (Date of Insertion:   )           If yes -     [] Right IJ     [] Left IJ [] Right Femoral [] Left Femoral                   [] Right Subclavian [] Left Subclavian     JOINER'S CATHETER:   [x] No   [] Yes  (Date of Insertion:   )     URINE OUTPUT:            [x] Good   [] Low              [] Anuric    OBJECTIVE:     VITAL SIGNS:  /79  
     INPATIENT CARDIOLOGY CONSULT     Reason for Consult: New AF    Cardiologist: Dr. Cervantes    Requesting Physician: Dr. Stanford    Date of Consultation: 6/24/2025    HISTORY OF PRESENT ILLNESS:   Patient is a 54 year old WM new to [facility].    He has a past medical history as detailed below.    Admission [facility] January/February 2025 due to gait instability in setting of extensive alcohol abuse.  He was found to have alcoholic ketoacidosis and hepatitis, diabetic ketoacidosis, pancreatitis and alcohol withdrawal syndrome.  Required ICU admission with Precedex and insulin drips, as well as seizure prophylaxis.      He presented to [facility] on June 21, 2025 with complaints of abdominal pain, shortness of breath and peripheral edema bilaterally.  States the symptoms have been ongoing for the past couple of weeks.  EMS was called, he was found reportedly in urine and stool.  He notes of nausea and poor appetite.  Has been drinking large amounts of alcoholic beverages over the past several weeks, typically minimum of half a bottle of vodka daily.  States he has alcohol delivered to his home.  EKG and telemetry revealed AF RVR, with cardiology being consulted for further evaluation.  He denies complaints of chest pain, palpitations, dizziness, near-syncope or syncope.  Denies PND, orthopnea.        Interim:  Awaiting transfer to telemetry  Hematology has cleared the patient to be on anticoagulation for A-fib stroke prophylaxis        PAST MEDICAL HISTORY:    HTN, with prior hypotension issues requiring Midodrine use  HLD, not on medical therapy  T2DM with peripheral neuropathy and retinopathy  Obesity, BMI 30  Alcohol abuse with history of multiple hospital admissions for alcohol withdrawal  History of alcoholic pancreatitis  Chronically elevated LFTs  Hypocalcemia  Chronic anemia  History of thrombocytopenia  GERD  BPH  Depression  Medical noncompliance    PAST SURGICAL HISTORY:    Past 
     INPATIENT CARDIOLOGY CONSULT     Reason for Consult: New AF    Cardiologist: Dr. Cervantes    Requesting Physician: Dr. Stanford    Date of Consultation: 6/25/2025    HISTORY OF PRESENT ILLNESS:   Patient is a 54 year old WM new to [facility].    He has a past medical history as detailed below.    Admission [facility] January/February 2025 due to gait instability in setting of extensive alcohol abuse.  He was found to have alcoholic ketoacidosis and hepatitis, diabetic ketoacidosis, pancreatitis and alcohol withdrawal syndrome.  Required ICU admission with Precedex and insulin drips, as well as seizure prophylaxis.      He presented to [facility] on June 21, 2025 with complaints of abdominal pain, shortness of breath and peripheral edema bilaterally.  States the symptoms have been ongoing for the past couple of weeks.  EMS was called, he was found reportedly in urine and stool.  He notes of nausea and poor appetite.  Has been drinking large amounts of alcoholic beverages over the past several weeks, typically minimum of half a bottle of vodka daily.  States he has alcohol delivered to his home.  EKG and telemetry revealed AF RVR, with cardiology being consulted for further evaluation.  He denies complaints of chest pain, palpitations, dizziness, near-syncope or syncope.  Denies PND, orthopnea.        Interim:  Echo revealed normal systolic function  Hematology has cleared the patient to be on anticoagulation for A-fib stroke prophylaxis        PAST MEDICAL HISTORY:    HTN, with prior hypotension issues requiring Midodrine use  HLD, not on medical therapy  T2DM with peripheral neuropathy and retinopathy  Obesity, BMI 30  Alcohol abuse with history of multiple hospital admissions for alcohol withdrawal  History of alcoholic pancreatitis  Chronically elevated LFTs  Hypocalcemia  Chronic anemia  History of thrombocytopenia  GERD  BPH  Depression  Medical noncompliance    PAST SURGICAL HISTORY:  
    Hospitalist Progress Note      Synopsis: Patient admitted on 6/21/2025  54 y.o. male with a history of ETOH, type 2 DM, HTN, HLD presents with 2-3 weeks of lower extremity edema, SOB, now with abdominal pain.  EMS reported patient was found in urine and stool.  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  He was eating food up until a week ago then he quit.  He feels nauseated.  He used to be on insulin a couple of years ago, but now only takes metformin.  Workup in ED significant for CO2 8, BUN 40, creatinine 1.3, anion gap 37, Mg 1.5, lactic 2.6, glucose 214, , troponin 33, pro-BNP 1247, AST 78, ALT 30, tbili 1.5, lipase 533, beta hydroxy >4.50, ethanol <10, WBC 4.3, hgb 11.2, plt 90, venous pH 7.303.  CXR no acute finding.  EKG AFib RVR rate 150s.      ASSESSMENT:    Principal Problem:    Alcoholic ketoacidosis  Active Problems:    Alcohol-induced acute pancreatitis without infection or necrosis    Severe alcohol use disorder (HCC)    Diabetes mellitus (HCC)    Diabetic ketoacidosis without coma associated with type 2 diabetes mellitus (HCC)    Pancytopenia (HCC)    Atrial fibrillation with RVR (HCC)    Alcohol withdrawal syndrome with complication (HCC)    Alcoholic liver disease  Resolved Problems:    * No resolved hospital problems. *       PLAN:    Alcoholic ketoacidosis vs DKA  Alcohol withdrawal  Alcoholic pancreatitis  Pancytopenia secondary to ETOH, ? Liver cirrhosis and hypersplenism   Hypomagnesemia  Alcoholic liver disease  - insulin gtt for DKA , anion gap closed , BHB within normal , off insulin drip   -  tolerating oral intake well   - phenobarb taper and prn  - trend electrolytes, replete prn  - MVI, thiamine, folic acid   folic acid deficiency , continue  folic acid po   Vitamin d deficiency <6 will start him on vitamin d 50,000 units weekly     ? AFib RVR  - digoxin IV x1, will start him on eliquis since platelet are stable and 
    Hospitalist Progress Note      Synopsis: Patient admitted on 6/21/2025  54 y.o. male with a history of ETOH, type 2 DM, HTN, HLD presents with 2-3 weeks of lower extremity edema, SOB, now with abdominal pain.  EMS reported patient was found in urine and stool.  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  He was eating food up until a week ago then he quit.  He feels nauseated.  He used to be on insulin a couple of years ago, but now only takes metformin.  Workup in ED significant for CO2 8, BUN 40, creatinine 1.3, anion gap 37, Mg 1.5, lactic 2.6, glucose 214, , troponin 33, pro-BNP 1247, AST 78, ALT 30, tbili 1.5, lipase 533, beta hydroxy >4.50, ethanol <10, WBC 4.3, hgb 11.2, plt 90, venous pH 7.303.  CXR no acute finding.  EKG AFib RVR rate 150s.      ASSESSMENT:    Principal Problem:    Alcoholic ketoacidosis  Active Problems:    Alcohol-induced acute pancreatitis without infection or necrosis    Severe alcohol use disorder (HCC)    Diabetes mellitus (HCC)    Diabetic ketoacidosis without coma associated with type 2 diabetes mellitus (HCC)    Pancytopenia (HCC)    Atrial fibrillation with RVR (HCC)    Alcohol withdrawal syndrome with complication (HCC)    Alcoholic liver disease  Resolved Problems:    * No resolved hospital problems. *       PLAN:    Alcoholic ketoacidosis vs DKA  Alcohol withdrawal  Alcoholic pancreatitis  Pancytopenia secondary to ETOH, ? Liver cirrhosis and hypersplenism   Hypomagnesemia  Alcoholic liver disease  - insulin gtt for DKA , anion gap closed , BHB within normal will d/c insulin drip   - d/c IVF since he is tolerating oral intake well   - phenobarb taper and prn  - trend electrolytes, replete prn  - MVI, thiamine, folic acid   folic acid deficiency , start folic acid po   Vitamin d deficiency <6 will start him on vitamin d 50,000 units weekly     ? AFib RVR  - digoxin IV x1    Abdominal pain ? Pancreatitis will 
    Hospitalist Progress Note      Synopsis: Patient admitted on 6/21/2025  54 y.o. male with a history of ETOH, type 2 DM, HTN, HLD presents with 2-3 weeks of lower extremity edema, SOB, now with abdominal pain.  EMS reported patient was found in urine and stool.  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  He was eating food up until a week ago then he quit.  He feels nauseated.  He used to be on insulin a couple of years ago, but now only takes metformin.  Workup in ED significant for CO2 8, BUN 40, creatinine 1.3, anion gap 37, Mg 1.5, lactic 2.6, glucose 214, , troponin 33, pro-BNP 1247, AST 78, ALT 30, tbili 1.5, lipase 533, beta hydroxy >4.50, ethanol <10, WBC 4.3, hgb 11.2, plt 90, venous pH 7.303.  CXR no acute finding.  EKG AFib RVR rate 150s.      ASSESSMENT:    Principal Problem:    Alcoholic ketoacidosis  Active Problems:    Alcohol-induced acute pancreatitis without infection or necrosis    Severe alcohol use disorder (HCC)    Diabetes mellitus (HCC)    Diabetic ketoacidosis without coma associated with type 2 diabetes mellitus (HCC)    Pancytopenia (HCC)    Atrial fibrillation with RVR (HCC)    Alcohol withdrawal syndrome with complication (HCC)    Alcoholic liver disease  Resolved Problems:    * No resolved hospital problems. *       PLAN:    Alcoholic ketoacidosis vs DKA  Alcohol withdrawal  Alcoholic pancreatitis  Transaminitis secondary to ETOH  Pancytopenia secondary to ETOH  Hypomagnesemia  Alcoholic liver disease  - insulin gtt  - critical care c/s  - IVF  - phenobarb taper and prn  - trend electrolytes, replete prn  - MVI, thiamine, folic acid     AFib RVR  - digoxin IV x1     Type 2 DM, diabetic ketoacidosis  - insulin gtt  Plan transition to basal bolus with improvement in bicarb and anion gap     HTN  HLD  - prn metoprolol for SBP > 150     Diet: Diet NPO Exceptions are: Sips of Water with Meds  Code Status: Prior  PT/OT Eval 
4 Eyes Skin Assessment     NAME:  Jose Wesley  YOB: 1970  MEDICAL RECORD NUMBER:  [id number]    The patient is being assessed for  Admission    I agree that at least one RN has performed a thorough Head to Toe Skin Assessment on the patient. ALL assessment sites listed below have been assessed.      Areas assessed by both nurses:    Head, Face, Ears, Shoulders, Back, Chest, Arms, Elbows, Hands, Sacrum. Buttock, Coccyx, Ischium, Legs. Feet and Heels, and Under Medical Devices         Does the Patient have a Wound? Yes wound(s) were present on assessment. LDA wound assessment was Initiated and completed by RN excoriation bilateral inner buttocks with open areas, red non blanchable, generalized bruising, bilateral heels dry cracking       Leobardo Prevention initiated by RN: Yes  Wound Care Orders initiated by RN: Yes    Pressure Injury (Stage 3,4, Unstageable, DTI, NWPT, and Complex wounds) if present, place Wound referral order by RN under : No    New Ostomies, if present place, Ostomy referral order under : No     Nurse 1 eSignature: Electronically signed by Yelitza Patel RN on 6/22/25 at 12:23 AM EDT    **SHARE this note so that the co-signing nurse can place an eSignature**    Nurse 2 eSignature: Electronically signed by Silvestre West RN on 6/22/25 at 12:33 AM EDT   
4 Eyes Skin Assessment     NAME:  Jose Wesley  YOB: 1970  MEDICAL RECORD NUMBER:  [id number]    The patient is being assessed for  Admission    I agree that at least one RN has performed a thorough Head to Toe Skin Assessment on the patient. ALL assessment sites listed below have been assessed.      Areas assessed by both nurses:    Head, Face, Ears, Shoulders, Back, Chest, Arms, Elbows, Hands, Sacrum. Buttock, Coccyx, Ischium, and Legs. Feet and Heels        Does the Patient have a Wound? Yes wound(s) were present on assessment. LDA wound assessment was Initiated and completed by RN Redness and excoriation groin, redness on buttocks       Leobardo Prevention initiated by RN: Yes  Wound Care Orders initiated by RN: No    Pressure Injury (Stage 3,4, Unstageable, DTI, NWPT, and Complex wounds) if present, place Wound referral order by RN under : No    New Ostomies, if present place, Ostomy referral order under : No     Nurse 1 eSignature: Electronically signed by Radha Sommer RN on 6/25/25 at 12:40 AM EDT    **SHARE this note so that the co-signing nurse can place an eSignature**    Nurse 2 eSignature: Electronically signed by Tanisha Avery RN on 6/25/25 at 4:11 AM EDT   
Assessment and Plan  Patient is a 54 y.o. male with the following medical Problems:   Alcoholic ketoacidosis (beta-hydroxybutyrate greater than 4.5)  Diabetic ketoacidosis (hemoglobin A1c 6% June 21, 2025)  Alcoholic hepatitis  Atrial fibrillation with RVR  Alcohol addiction  Pancytopenia due to alcohol abuse.  History of alcohol withdrawal/delirium tremens  Pancreatitis with elevated lipase  Hepatic steatosis, severe  Prostatic enlargement by CT        PLAN:  Insulin drip.  DKA protocol  IV Protonix  As needed Versed  Cardiology consult for atrial fibrillation with RVR.  Lovenox for DVT prophylaxis  Agree with phenobarbital taper  Thiamine, folate daily  Diabetic diet.  Precedex for withdrawal symptoms  PT, OT, and out of bed as tolerated.      History of Present Illness:   Patient is a 54-year-old man with above-mentioned medical problem who presented with bilateral lower extremity swelling, shortness of breath, and intermittent agitation.  Patient reportedly consumes a half bottle of vodka daily.  He was previously admitted for DKA and alcoholic ketoacidosis.  Beta-hydroxybutyrate was elevated with elevated blood sugar and patient was started on insulin drip.  Initial EKG showed atrial fibrillation with RVR.    Daily progress:  June 22, 2025: Patient continues to be intermittently confused and continues to be on insulin drip.  Beta-hydroxybutyrate remained elevated.  Electrolytes were repleted.  He has no fever, chills, rigors.  He is currently on phenobarbital dose.  Precedex was started due to withdrawal symptoms.  As needed Versed was ordered.      Past Medical History:  Past Medical History:   Diagnosis Date    Alcohol use disorder     Alcohol-induced acute pancreatitis 08/13/2024    Diabetes mellitus (HCC)     Diabetic retinopathy (HCC)     Hyperlipidemia     Hypertension     Hyponatremia 08/14/2024    Orthostatic hypotension 08/14/2024      Past Surgical History:   Procedure Laterality Date    TONSILECTOMY, 
CLINICAL PHARMACY NOTE: MEDS TO BEDS    Total # of Prescriptions Filled: 8   The following medications were delivered to the patient:  Metoprolol Succ ER 25 mg  Thiamine 100 mg  Folic Acid 1 mg  Pantoprazole 40 mg  Eliquis 5 mg  Humalog Kwikpen 100u/mL  Bisacodyl EC 5 mg  Vitamin D2 1.25 mg    Additional Documentation:    
DVT Prophylaxis Adjustment Policy (DVT Prophylaxis)     This patient is on DVT Prophylaxis medication that requires a dose adjustment      Date Body Weight IBW  Adjusted BW SCr  CrCl Dialysis status   6/23/2025 88 kg (193 lb 14.4 oz) Ideal body weight: 66.1 kg (145 lb 11.6 oz)  Adjusted ideal body weight: 74.8 kg (164 lb 15.9 oz) Serum creatinine: 0.6 mg/dL (L) 06/23/25 0405  Estimated creatinine clearance: 151 mL/min (A) N/a       Pharmacy has dose-adjusted the DVT Prophylaxis regimen to match   the recommendations from the following table        Ordered Medication:Lovenox 30mg daily    Order Changed/converted to: Lovenox 40mg daily      These changes were made per protocol according to the [facility] Pharmacist   Review for Appropriate Use and Automatic Dose Adjustments of   Subcutaneous Anticoagulants Policy     *Please note this dose may need readjusted if patient's condition changes.    Please contact pharmacy with any questions regarding these changes.    Gail Avery RPH  6/23/2025  10:11 AM    
Intensive Care Daily Quality Rounding Checklist      ICU Day #: 2    SOFA Score: Patient Scoring              Sequential Organ Failure Assessment:  4           Intubation Date and Number of days on Ventilator:  n/a    Lines:  PIV x3  CVC Indication: n/a      DVT Prophylaxis: enoxaparin Sodium - 30 MG/0.3ML    GI Prophylaxis: protonix 40 daily iv    Weiss Catheter Insertion Date:  n/a  Indications: n/a  Continued need (if yes, reason documented and discussed with physician): n/a      Skin Issues/ Wounds and ordered treatment discussed on rounds: yes, excoriation with open areas bilateral inner buttocks    Goals/ Plans for the Day: Activity Progression  Blood glucose WNL, Code Status - Clarify Status   , Education/Learning - Alternate Learner, Falls Prevention  maintain patient safety, and Labs - Report Results    Reviewed plan and goals for day with patient and/or representative: Yes    **SHARE this note so that the rounding nurse may edit**    
Intensive Care Daily Quality Rounding Checklist      ICU Day #: 3    SOFA Score: Patient Scoring              Sequential Organ Failure Assessment:  3           Intubation Date and Number of days on Ventilator:      Lines:    CVC Indication:       DVT Prophylaxis: enoxaparin Sodium - 30 MG/0.3ML    GI Prophylaxis: protonix 40 mg iv dailly    Weiss Catheter Insertion Date:    Indications:   Continued need (if yes, reason documented and discussed with physician): {YES/NO:19670}      Skin Issues/ Wounds and ordered treatment discussed on rounds: {YES/NO:19670}    Goals/ Plans for the Day: {[facility] Quality Flow Goal(s) of the Day/Commitment(s):06703798}    Reviewed plan and goals for day with patient and/or representative: {YES/NO:19726}    **SHARE this note so that the rounding nurse may edit**     
Intensive Care Daily Quality Rounding Checklist      ICU Day #: 5    SOFA Score: Patient Scoring              Sequential Organ Failure Assessment:  3           Intubation Date and Number of days on Ventilator:      Lines:    CVC Indication:       DVT Prophylaxis: enoxaparin - 40 MG/0.4ML    GI Prophylaxis: protonix 40mg daily    Weiss Catheter Insertion Date:    Indications: {FoleyIndications:07927}  Continued need (if yes, reason documented and discussed with physician): {YES/NO:19670}      Skin Issues/ Wounds and ordered treatment discussed on rounds: {YES/NO:19670}    Goals/ Plans for the Day: {[facility] Quality Flow Goal(s) of the Day/Commitment(s):56577250}    Reviewed plan and goals for day with patient and/or representative: {YES/NO:19726}    **SHARE this note so that the rounding nurse may edit**    
[facility]  Hematology/Oncology  Consult      Patient Name: Jose Wesley  YOB: 1970  PCP: Alena Guerrero MD   Referring Provider:      Reason for Consultation:   Chief Complaint   Patient presents with    Leg Swelling     Bilateral LE edema from home, history of alcohol problem, patient admits to drinking today    Tachycardia     Patient's heart rate running in to 130's-160's at home, mentions         History of Present Illness:  54-year-old man with prior history of alcoholic pancreatitis, alcoholic hepatitis, diabetic ketoacidosis, diabetes mellitus with retinopathy, hypertension.  He was hospitalized on 621 with abdominal pain, dyspnea and peripheral edema.  He has been drinking excessive amount of alcohol.  He was noted to be in atrial fibrillation with RVR.  He is seen by cardiology with recommendation for Eliquis if cleared by hematology  CMP with creatinine of 0.6.  Hepatic panel with elevation in AST and total bilirubin.  Serum lipase elevated at 533.  CBC with a hemoglobin of 8.8 with an MCV of 97 with WBC count of 2.6 and platelets of 82  Ultrasound of abdomen pending  Most recent CT abdomen had shown severe hepatic steatosis .  Folate level was low and B12 was normal.  Iron studies pending    Diagnostic Data:     Past Medical History:   Diagnosis Date    Alcohol use disorder     Alcohol-induced acute pancreatitis 08/13/2024    Diabetes mellitus (HCC)     Diabetic retinopathy (HCC)     Hyperlipidemia     Hypertension     Hyponatremia 08/14/2024    Orthostatic hypotension 08/14/2024       Patient Active Problem List    Diagnosis Date Noted    Alcoholic liver disease 06/22/2025    Atrial fibrillation with RVR (HCC) 06/21/2025    Alcohol withdrawal syndrome with complication (HCC) 06/21/2025    Elevated bilirubin 02/03/2025    Hepatitis 02/03/2025    Pancytopenia (HCC) 02/01/2025    Alcoholic hepatitis (HCC) 01/31/2025    Diabetic ketoacidosis without coma associated with type 
[facility]  Hematology/Oncology  Consult      Patient Name: Jose Wesley  YOB: 1970  PCP: Alena Guerrero MD   Referring Provider:      Reason for Consultation:   Chief Complaint   Patient presents with    Leg Swelling     Bilateral LE edema from home, history of alcohol problem, patient admits to drinking today    Tachycardia     Patient's heart rate running in to 130's-160's at home, mentions         History of Present Illness:  54-year-old man with prior history of alcoholic pancreatitis, alcoholic hepatitis, diabetic ketoacidosis, diabetes mellitus with retinopathy, hypertension.  He was hospitalized on 621 with abdominal pain, dyspnea and peripheral edema.  He has been drinking excessive amount of alcohol.  He was noted to be in atrial fibrillation with RVR.  He is seen by cardiology with recommendation for Eliquis if cleared by hematology  CMP with creatinine of 0.6.  Hepatic panel with elevation in AST and total bilirubin.  Serum lipase elevated at 533.  CBC with a hemoglobin of 8.8 with an MCV of 97 with WBC count of 2.6 and platelets of 82  Ultrasound of abdomen pending  Most recent CT abdomen had shown severe hepatic steatosis .  Folate level was low and B12 was normal.  Iron studies pending    Diagnostic Data:     Past Medical History:   Diagnosis Date    Alcohol use disorder     Alcohol-induced acute pancreatitis 08/13/2024    Diabetes mellitus (HCC)     Diabetic retinopathy (HCC)     Hyperlipidemia     Hypertension     Hyponatremia 08/14/2024    Orthostatic hypotension 08/14/2024       Patient Active Problem List    Diagnosis Date Noted    Alcoholic liver disease 06/22/2025    Atrial fibrillation with RVR (HCC) 06/21/2025    Alcohol withdrawal syndrome with complication (HCC) 06/21/2025    Elevated bilirubin 02/03/2025    Hepatitis 02/03/2025    Pancytopenia (HCC) 02/01/2025    Alcoholic hepatitis (HCC) 01/31/2025    Diabetic ketoacidosis without coma associated with type 
OCCUPATIONAL THERAPY INITIAL EVALUATION    [facility]  [address] [place]. [place]        Date:2025                                                  Patient Name: Jose Wesley    MRN: [id number]    : 1970    Room: [place] [address]      Evaluating OT: Mackenzie Brush OTR/L;  [id number]     Referring Provider and Specific Provider Orders/Date:      25 1000  OT eval and treat  Start:  25 1000,   End:  25 1000,   ONE TIME,   Standing Count:  1 Occurrences,   R         Karen Horan MD Acknowledge New      Placement Recommendation: Acute rehab versus subacute rehab       Diagnosis:   1. Alcoholic ketoacidosis    2. Alcohol withdrawal syndrome with complication (HCC)    3. Atrial fibrillation with RVR (HCC)         Surgery: none      Pertinent Medical History:       Past Medical History:   Diagnosis Date    Alcohol use disorder     Alcohol-induced acute pancreatitis 2024    Diabetes mellitus (HCC)     Diabetic retinopathy (HCC)     Hyperlipidemia     Hypertension     Hyponatremia 2024    Orthostatic hypotension 2024         Past Surgical History:   Procedure Laterality Date    TONSILECTOMY, ADENOIDECTOMY, BILATERAL MYRINGOTOMY AND TUBES Bilateral     without adenoidectomy, no tubes in ear      Precautions:  Fall Risk, external catheter, alcohol abuse       Assessment of current deficits:     [x] Functional mobility  [x]ADLs  [x] Strength               []Cognition    [x] Functional transfers   [x] IADLs         [x] Safety Awareness   [x]Endurance    [] Fine Coordination              [x] Balance      [] Vision/perception   []Sensation     []Gross Motor Coordination  [] ROM  [] Delirium                   [] Motor Control     OT PLAN OF CARE   OT POC based on physician orders, patient diagnosis and results of clinical assessment    Frequency/Duration 1-3 days/wk for 2 weeks PRN     Specific OT Treatment 
Occupational Therapy  OCCUPATIONAL THERAPY TREATMENT NOTE    [facility]  [address] [facility]. [facility]  [address] [place], [place]   OT BEDSIDE TREATMENT NOTE      Date:2025  Patient Name: Jose Wesley  MRN: [id number]  : 1970  Room: [address] [place] [address]      Evaluating OT: Mackenzie Brush OTR/L;  [id number]      Referring Provider and Specific Provider Orders/Date:      25 1000   OT eval and treat  Start:  25 1000,   End:  25 1000,   ONE TIME,   Standing Count:  1 Occurrences,   R         Karen Horan MD Acknowledge New       Placement Recommendation: subacute rehab        Diagnosis:   1. Alcoholic ketoacidosis    2. Alcohol withdrawal syndrome with complication (HCC)    3. Atrial fibrillation with RVR (HCC)         Surgery: none       Pertinent Medical History:       Past Medical History        Past Medical History:   Diagnosis Date    Alcohol use disorder      Alcohol-induced acute pancreatitis 2024    Diabetes mellitus (HCC)      Diabetic retinopathy (HCC)      Hyperlipidemia      Hypertension      Hyponatremia 2024    Orthostatic hypotension 2024            Past Surgical History         Past Surgical History:   Procedure Laterality Date    TONSILECTOMY, ADENOIDECTOMY, BILATERAL MYRINGOTOMY AND TUBES Bilateral       without adenoidectomy, no tubes in ear          Precautions:  Fall Risk, external catheter, alcohol abuse        Assessment of current deficits:     [x] Functional mobility            [x]ADLs           [x] Strength                  []Cognition    [x] Functional transfers          [x] IADLs         [x] Safety Awareness   [x]Endurance    [] Fine Coordination                         [x] Balance      [] Vision/perception   []Sensation      []Gross Motor Coordination             [] ROM           [] Delirium                   [] Motor Control      OT PLAN OF CARE 
Physical Therapy Initial Evaluation/Plan of Care    Room #:  IC02/IC02-01  Patient Name: Jose Wesley  YOB: 1970  MRN: [id number]    Date of Service: 6/23/2025     Tentative placement recommendation: Subacute Rehab  Equipment recommendation: Wheeled Walker      Evaluating Physical Therapist: JOLANTA Fowler      Specific Provider Orders/Date/Referring Provider : PT eval and treat  Start:  06/22/25 1000,   End:  06/22/25 1000,   ONE TIME,   Standing Count:  1 Occurrences,   R       Karen Horan MD    Admitting Diagnosis:   Alcoholic ketoacidosis [E87.29]  Atrial fibrillation with RVR (HCC) [I48.91]  Alcohol withdrawal syndrome with complication (HCC) [F10.939]    Admitted with Leg Swelling, Tachycardia and above    Surgery: none  Visit Diagnoses         Codes      Atrial fibrillation, unspecified type (HCC)     I48.91            Patient Active Problem List   Diagnosis    Acute renal failure    Alcohol-induced acute pancreatitis without infection or necrosis    Severe alcohol use disorder (HCC)    Alcohol withdrawal syndrome without complication (HCC)    Alcoholic ketoacidosis    Diabetes mellitus (HCC)    Interstitial pancreatitis (HCC)    Hypertension    Hyperlipidemia    Diabetic retinopathy (HCC)    Alcoholic hepatitis (HCC)    Diabetic ketoacidosis without coma associated with type 2 diabetes mellitus (HCC)    Severe episode of recurrent major depressive disorder, without psychotic features (HCC)    Pseudohyponatremia    Hyperkalemia    Pancytopenia (HCC)    Elevated bilirubin    Hepatitis    Atrial fibrillation with RVR (HCC)    Alcohol withdrawal syndrome with complication (HCC)    Alcoholic liver disease        ASSESSMENT of Current Deficits Patient exhibits decreased strength, balance, and endurance impairing functional mobility, transfers, gait , gait distance, and tolerance to activity. Patient is cooperative and follows direction. Patient displayed unsteadiness, 
Spiritual Health History and Assessment/Progress Note  HUYEN Estevez    (P) Initial Encounter,  ,  ,      Name: Jose Wesley MRN: [id number]    Age: 54 y.o.     Sex: male   Language: English   Church: None   Alcoholic ketoacidosis     Date: 6/23/2025                           Spiritual Assessment began in [facility] 2 ICU        Referral/Consult From: (P) Rounding   Encounter Overview/Reason: (P) Initial Encounter  Service Provided For: (P) Patient    Paige, Belief, Meaning:   Patient has beliefs or practices that help with coping during difficult times  Family/Friends No family/friends present      Importance and Influence:  Patient has spiritual/personal beliefs that influence decisions regarding their health  Family/Friends No family/friends present    Assessment and Plan of Care:     Patient Interventions include: Facilitated expression of thoughts and feelings  Family/Friends Interventions include: No family/friends present    Patient Plan of Care: Spiritual Care available upon further referral  Family/Friends Plan of Care: No family/friends present    Electronically signed by Chaplain Rebeka on 6/23/2025 at 11:34 AM    
Yes  [x] N/A    DISCONTINUE ANY LABS:   [x] No   [] Yes    ICU PROPHYLAXIS:  Stress ulcer:  [x] PPI Agent  [] [id number] [] Sucralfate  [] Other:  VTE:   [x] Enoxaparin  [] Unfract. Heparin Subcut  [] EPC Cuffs    NUTRITION:  [] NPO [] Tube Feeding (Specify: ) [] TPN  [x] PO (Diet: ADULT DIET; Regular; 5 carb choices (75 gm/meal))    HOME MEDICATIONS RECONCILED: [] No  [x] Yes    INSULIN DRIP:   [x] No   [] Yes    CONSULTATION NEEDED:  [] No   [x] Yes    FAMILY UPDATED:    [] No   [x] Yes    TRANSFER OUT OF ICU:   [x] No   [] Yes    ADDITIONAL PLAN:    In addition the following applies:    Check: serial labs, LDH, Haptoglobin, PS, PF4 ab, Fibrinogen    Medication Alterations: Lovenox to 40 mg subcutaneous daily   Procedures: Echocardiogram   Imaging: reviewed  New Consultations: Cardiology   VENT: N/A    Access: Peripheral   Consults: Cardiology   Drips: N/A  DVT Prophylaxis:  GI Prophylaxis: PPI  Nutrition: PO  ABX: N/A  Blood Products: N/A  - PRBC:  - FFP  - SDP:  - Cryoprecipitate:    OK to floors  Replace electrolytes   Outpatient endocrinology and ophthalmology follow ups  Diabetic education      I have spent a total time of 35 minutes of this patient encounter reviewing chart, labs, coordinating care with interdisciplinary teams, face to face encounter with patient, providing counseling/education to patient/family.     Cuong Breen MD, M.D. F.C.C.P                     6/24/2025, 10:21 AM    NOTE: This report, in part or full, may have been transcribed using voice recognition software. Every effort was made to ensure accuracy; however, inadvertent computerized transcription errors may be present. Please excuse any transcriptional grammatical or spelling errors that may have escaped my editorial review.

## 2025-07-02 ENCOUNTER — TELEPHONE (OUTPATIENT)
Dept: INFUSION THERAPY | Age: 55
End: 2025-07-02

## 2025-07-24 ENCOUNTER — TELEPHONE (OUTPATIENT)
Dept: INFUSION THERAPY | Age: 55
End: 2025-07-24

## 2025-07-30 ENCOUNTER — HOSPITAL ENCOUNTER (INPATIENT)
Age: 55
LOS: 10 days | Discharge: NURSING, REHAB OR LONG-TERM CARE | End: 2025-08-09
Admitting: FAMILY MEDICINE
Payer: COMMERCIAL

## 2025-07-30 ENCOUNTER — APPOINTMENT (OUTPATIENT)
Dept: GENERAL RADIOLOGY | Age: 55
End: 2025-07-30
Payer: COMMERCIAL

## 2025-07-30 ENCOUNTER — APPOINTMENT (OUTPATIENT)
Dept: CT IMAGING | Age: 55
End: 2025-07-30
Payer: COMMERCIAL

## 2025-07-30 DIAGNOSIS — K29.80 DUODENITIS: ICD-10-CM

## 2025-07-30 DIAGNOSIS — R10.84 GENERALIZED ABDOMINAL PAIN: ICD-10-CM

## 2025-07-30 DIAGNOSIS — F10.20 SEVERE ALCOHOL USE DISORDER (HCC): ICD-10-CM

## 2025-07-30 DIAGNOSIS — R53.1 GENERALIZED WEAKNESS: ICD-10-CM

## 2025-07-30 DIAGNOSIS — R53.81 PHYSICAL DECONDITIONING: ICD-10-CM

## 2025-07-30 DIAGNOSIS — E83.42 HYPOMAGNESEMIA: ICD-10-CM

## 2025-07-30 DIAGNOSIS — E87.29 ALCOHOLIC KETOACIDOSIS: Primary | ICD-10-CM

## 2025-07-30 DIAGNOSIS — F10.10 ALCOHOL ABUSE: ICD-10-CM

## 2025-07-30 PROBLEM — K70.10 ALCOHOLIC HEPATITIS (HCC): Status: RESOLVED | Noted: 2025-01-31 | Resolved: 2025-07-30

## 2025-07-30 PROBLEM — E11.10 DIABETIC KETOACIDOSIS WITHOUT COMA ASSOCIATED WITH TYPE 2 DIABETES MELLITUS (HCC): Status: RESOLVED | Noted: 2025-01-31 | Resolved: 2025-07-30

## 2025-07-30 PROBLEM — D61.818 PANCYTOPENIA (HCC): Status: RESOLVED | Noted: 2025-02-01 | Resolved: 2025-07-30

## 2025-07-30 PROBLEM — F10.930 ALCOHOL WITHDRAWAL SYNDROME WITHOUT COMPLICATION (HCC): Status: RESOLVED | Noted: 2024-08-12 | Resolved: 2025-07-30

## 2025-07-30 PROBLEM — K85.20 ALCOHOL-INDUCED ACUTE PANCREATITIS WITHOUT INFECTION OR NECROSIS: Status: RESOLVED | Noted: 2024-08-12 | Resolved: 2025-07-30

## 2025-07-30 PROBLEM — E87.5 HYPERKALEMIA: Status: RESOLVED | Noted: 2025-01-31 | Resolved: 2025-07-30

## 2025-07-30 PROBLEM — K75.9 HEPATITIS: Status: RESOLVED | Noted: 2025-02-03 | Resolved: 2025-07-30

## 2025-07-30 PROBLEM — I48.91 ATRIAL FIBRILLATION WITH RVR (HCC): Status: RESOLVED | Noted: 2025-06-21 | Resolved: 2025-07-30

## 2025-07-30 PROBLEM — R79.89 PSEUDOHYPONATREMIA: Status: RESOLVED | Noted: 2025-01-31 | Resolved: 2025-07-30

## 2025-07-30 PROBLEM — K86.1 INTERSTITIAL PANCREATITIS (HCC): Status: RESOLVED | Noted: 2024-08-13 | Resolved: 2025-07-30

## 2025-07-30 LAB
ALBUMIN SERPL-MCNC: 4.1 G/DL (ref 3.5–5.2)
ALP SERPL-CCNC: 160 U/L (ref 40–129)
ALT SERPL-CCNC: 35 U/L (ref 0–40)
ANION GAP SERPL CALCULATED.3IONS-SCNC: 31 MMOL/L (ref 7–16)
ANION GAP SERPL CALCULATED.3IONS-SCNC: 32 MMOL/L (ref 7–16)
AST SERPL-CCNC: 132 U/L (ref 0–39)
B-OH-BUTYR SERPL-MCNC: >4.5 MMOL/L (ref 0.02–0.27)
BASOPHILS # BLD: 0 K/UL (ref 0–0.2)
BASOPHILS NFR BLD: 0 % (ref 0–2)
BILIRUB SERPL-MCNC: 3.6 MG/DL (ref 0–1.2)
BUN SERPL-MCNC: 14 MG/DL (ref 6–20)
BUN SERPL-MCNC: 15 MG/DL (ref 6–20)
CALCIUM SERPL-MCNC: 8.4 MG/DL (ref 8.6–10)
CALCIUM SERPL-MCNC: 9.1 MG/DL (ref 8.6–10.2)
CHLORIDE SERPL-SCNC: 88 MMOL/L (ref 98–107)
CHLORIDE SERPL-SCNC: 90 MMOL/L (ref 98–107)
CO2 SERPL-SCNC: 15 MMOL/L (ref 22–29)
CO2 SERPL-SCNC: 15 MMOL/L (ref 22–29)
CREAT SERPL-MCNC: 0.8 MG/DL (ref 0.7–1.2)
CREAT SERPL-MCNC: 0.9 MG/DL (ref 0.7–1.2)
EOSINOPHIL # BLD: 0 K/UL (ref 0.05–0.5)
EOSINOPHILS RELATIVE PERCENT: 0 % (ref 0–6)
ERYTHROCYTE [DISTWIDTH] IN BLOOD BY AUTOMATED COUNT: 13.5 % (ref 11.5–15)
GFR, ESTIMATED: >90 ML/MIN/1.73M2
GFR, ESTIMATED: >90 ML/MIN/1.73M2
GLUCOSE BLD-MCNC: 135 MG/DL (ref 74–99)
GLUCOSE BLD-MCNC: 161 MG/DL (ref 74–99)
GLUCOSE BLD-MCNC: 177 MG/DL (ref 74–99)
GLUCOSE SERPL-MCNC: 150 MG/DL (ref 74–99)
GLUCOSE SERPL-MCNC: 183 MG/DL (ref 74–99)
HCT VFR BLD AUTO: 30.2 % (ref 37–54)
HGB BLD-MCNC: 10.1 G/DL (ref 12.5–16.5)
LACTATE BLDV-SCNC: 2.2 MMOL/L (ref 0.5–2.2)
LIPASE SERPL-CCNC: 103 U/L (ref 13–60)
LYMPHOCYTES NFR BLD: 0.13 K/UL (ref 1.5–4)
LYMPHOCYTES RELATIVE PERCENT: 2 % (ref 20–42)
MAGNESIUM SERPL-MCNC: 1.3 MG/DL (ref 1.6–2.6)
MCH RBC QN AUTO: 34.2 PG (ref 26–35)
MCHC RBC AUTO-ENTMCNC: 33.4 G/DL (ref 32–34.5)
MCV RBC AUTO: 102.4 FL (ref 80–99.9)
MONOCYTES NFR BLD: 0.7 K/UL (ref 0.1–0.95)
MONOCYTES NFR BLD: 11 % (ref 2–12)
NEUTROPHILS NFR BLD: 87 % (ref 43–80)
NEUTS SEG NFR BLD: 5.57 K/UL (ref 1.8–7.3)
PH VENOUS: 7.44 (ref 7.35–7.45)
PHOSPHATE SERPL-MCNC: 2.1 MG/DL (ref 2.5–4.5)
PLATELET # BLD AUTO: 154 K/UL (ref 130–450)
PMV BLD AUTO: 9.6 FL (ref 7–12)
POTASSIUM SERPL-SCNC: 4.2 MMOL/L (ref 3.5–5.1)
POTASSIUM SERPL-SCNC: 4.5 MMOL/L (ref 3.5–5)
PROT SERPL-MCNC: 7.3 G/DL (ref 6.4–8.3)
RBC # BLD AUTO: 2.95 M/UL (ref 3.8–5.8)
RBC # BLD: NORMAL 10*6/UL
SODIUM SERPL-SCNC: 135 MMOL/L (ref 132–146)
SODIUM SERPL-SCNC: 136 MMOL/L (ref 136–145)
WBC OTHER # BLD: 6.4 K/UL (ref 4.5–11.5)

## 2025-07-30 PROCEDURE — 99285 EMERGENCY DEPT VISIT HI MDM: CPT

## 2025-07-30 PROCEDURE — 85025 COMPLETE CBC W/AUTO DIFF WBC: CPT

## 2025-07-30 PROCEDURE — 93005 ELECTROCARDIOGRAM TRACING: CPT | Performed by: PHYSICIAN ASSISTANT

## 2025-07-30 PROCEDURE — 83735 ASSAY OF MAGNESIUM: CPT

## 2025-07-30 PROCEDURE — 80048 BASIC METABOLIC PNL TOTAL CA: CPT

## 2025-07-30 PROCEDURE — 2500000003 HC RX 250 WO HCPCS: Performed by: PHYSICIAN ASSISTANT

## 2025-07-30 PROCEDURE — 99223 1ST HOSP IP/OBS HIGH 75: CPT | Performed by: FAMILY MEDICINE

## 2025-07-30 PROCEDURE — 82962 GLUCOSE BLOOD TEST: CPT

## 2025-07-30 PROCEDURE — 83036 HEMOGLOBIN GLYCOSYLATED A1C: CPT

## 2025-07-30 PROCEDURE — 84100 ASSAY OF PHOSPHORUS: CPT

## 2025-07-30 PROCEDURE — 82800 BLOOD PH: CPT

## 2025-07-30 PROCEDURE — 2500000003 HC RX 250 WO HCPCS: Performed by: FAMILY MEDICINE

## 2025-07-30 PROCEDURE — 74177 CT ABD & PELVIS W/CONTRAST: CPT

## 2025-07-30 PROCEDURE — 6370000000 HC RX 637 (ALT 250 FOR IP): Performed by: FAMILY MEDICINE

## 2025-07-30 PROCEDURE — 6360000004 HC RX CONTRAST MEDICATION: Performed by: RADIOLOGY

## 2025-07-30 PROCEDURE — HZ2ZZZZ DETOXIFICATION SERVICES FOR SUBSTANCE ABUSE TREATMENT: ICD-10-PCS | Performed by: FAMILY MEDICINE

## 2025-07-30 PROCEDURE — 6370000000 HC RX 637 (ALT 250 FOR IP): Performed by: PHYSICIAN ASSISTANT

## 2025-07-30 PROCEDURE — 71046 X-RAY EXAM CHEST 2 VIEWS: CPT

## 2025-07-30 PROCEDURE — 83690 ASSAY OF LIPASE: CPT

## 2025-07-30 PROCEDURE — 6360000002 HC RX W HCPCS: Performed by: PHYSICIAN ASSISTANT

## 2025-07-30 PROCEDURE — 83605 ASSAY OF LACTIC ACID: CPT

## 2025-07-30 PROCEDURE — 2000000000 HC ICU R&B

## 2025-07-30 PROCEDURE — 80053 COMPREHEN METABOLIC PANEL: CPT

## 2025-07-30 PROCEDURE — 82010 KETONE BODYS QUAN: CPT

## 2025-07-30 PROCEDURE — 6360000002 HC RX W HCPCS

## 2025-07-30 PROCEDURE — 99291 CRITICAL CARE FIRST HOUR: CPT

## 2025-07-30 PROCEDURE — 96374 THER/PROPH/DIAG INJ IV PUSH: CPT

## 2025-07-30 PROCEDURE — 2580000003 HC RX 258: Performed by: PHYSICIAN ASSISTANT

## 2025-07-30 RX ORDER — SODIUM CHLORIDE 0.9 % (FLUSH) 0.9 %
5-40 SYRINGE (ML) INJECTION EVERY 12 HOURS SCHEDULED
Status: DISCONTINUED | OUTPATIENT
Start: 2025-07-30 | End: 2025-07-31

## 2025-07-30 RX ORDER — THIAMINE HYDROCHLORIDE 100 MG/ML
100 INJECTION, SOLUTION INTRAMUSCULAR; INTRAVENOUS ONCE
Status: DISCONTINUED | OUTPATIENT
Start: 2025-07-30 | End: 2025-07-30 | Stop reason: ALTCHOICE

## 2025-07-30 RX ORDER — FOLIC ACID 1 MG/1
1 TABLET ORAL ONCE
Status: DISCONTINUED | OUTPATIENT
Start: 2025-07-30 | End: 2025-07-30 | Stop reason: CLARIF

## 2025-07-30 RX ORDER — GAUZE BANDAGE 2" X 2"
100 BANDAGE TOPICAL DAILY
Status: DISCONTINUED | OUTPATIENT
Start: 2025-07-31 | End: 2025-07-30 | Stop reason: CLARIF

## 2025-07-30 RX ORDER — PHENOBARBITAL 32.4 MG/1
32.4 TABLET ORAL 2 TIMES DAILY
Status: COMPLETED | OUTPATIENT
Start: 2025-07-31 | End: 2025-08-01

## 2025-07-30 RX ORDER — PHENOBARBITAL 32.4 MG/1
16.2 TABLET ORAL 2 TIMES DAILY
Status: COMPLETED | OUTPATIENT
Start: 2025-08-01 | End: 2025-08-02

## 2025-07-30 RX ORDER — SODIUM CHLORIDE 0.9 % (FLUSH) 0.9 %
5-40 SYRINGE (ML) INJECTION PRN
Status: DISCONTINUED | OUTPATIENT
Start: 2025-07-30 | End: 2025-08-09 | Stop reason: HOSPADM

## 2025-07-30 RX ORDER — SODIUM CHLORIDE 9 MG/ML
INJECTION, SOLUTION INTRAVENOUS CONTINUOUS
Status: DISCONTINUED | OUTPATIENT
Start: 2025-07-30 | End: 2025-07-31

## 2025-07-30 RX ORDER — MAGNESIUM SULFATE IN WATER 40 MG/ML
2000 INJECTION, SOLUTION INTRAVENOUS PRN
Status: DISCONTINUED | OUTPATIENT
Start: 2025-07-30 | End: 2025-08-01

## 2025-07-30 RX ORDER — 0.9 % SODIUM CHLORIDE 0.9 %
1000 INTRAVENOUS SOLUTION INTRAVENOUS ONCE
Status: DISCONTINUED | OUTPATIENT
Start: 2025-07-30 | End: 2025-07-31

## 2025-07-30 RX ORDER — 0.9 % SODIUM CHLORIDE 0.9 %
15 INTRAVENOUS SOLUTION INTRAVENOUS ONCE
Status: COMPLETED | OUTPATIENT
Start: 2025-07-30 | End: 2025-07-30

## 2025-07-30 RX ORDER — DEXTROSE MONOHYDRATE AND SODIUM CHLORIDE 5; .45 G/100ML; G/100ML
INJECTION, SOLUTION INTRAVENOUS CONTINUOUS PRN
Status: DISCONTINUED | OUTPATIENT
Start: 2025-07-30 | End: 2025-08-01

## 2025-07-30 RX ORDER — FOLIC ACID 5 MG/ML
1 INJECTION, SOLUTION INTRAMUSCULAR; INTRAVENOUS; SUBCUTANEOUS DAILY
Status: DISCONTINUED | OUTPATIENT
Start: 2025-07-30 | End: 2025-07-31

## 2025-07-30 RX ORDER — ACETAMINOPHEN 650 MG/1
650 SUPPOSITORY RECTAL EVERY 6 HOURS PRN
Status: DISCONTINUED | OUTPATIENT
Start: 2025-07-30 | End: 2025-08-09 | Stop reason: HOSPADM

## 2025-07-30 RX ORDER — 0.9 % SODIUM CHLORIDE 0.9 %
1000 INTRAVENOUS SOLUTION INTRAVENOUS ONCE
Status: COMPLETED | OUTPATIENT
Start: 2025-07-30 | End: 2025-07-30

## 2025-07-30 RX ORDER — PANTOPRAZOLE SODIUM 40 MG/10ML
40 INJECTION, POWDER, LYOPHILIZED, FOR SOLUTION INTRAVENOUS ONCE
Status: DISCONTINUED | OUTPATIENT
Start: 2025-07-30 | End: 2025-07-31

## 2025-07-30 RX ORDER — PHENOBARBITAL 32.4 MG/1
32.4 TABLET ORAL EVERY 6 HOURS PRN
Status: ACTIVE | OUTPATIENT
Start: 2025-07-30 | End: 2025-08-01

## 2025-07-30 RX ORDER — ONDANSETRON 2 MG/ML
4 INJECTION INTRAMUSCULAR; INTRAVENOUS EVERY 6 HOURS PRN
Status: DISCONTINUED | OUTPATIENT
Start: 2025-07-30 | End: 2025-08-09 | Stop reason: HOSPADM

## 2025-07-30 RX ORDER — PHENOBARBITAL 32.4 MG/1
32.4 TABLET ORAL 4 TIMES DAILY
Status: COMPLETED | OUTPATIENT
Start: 2025-07-30 | End: 2025-07-31

## 2025-07-30 RX ORDER — POTASSIUM CHLORIDE 7.45 MG/ML
10 INJECTION INTRAVENOUS PRN
Status: DISCONTINUED | OUTPATIENT
Start: 2025-07-30 | End: 2025-08-01

## 2025-07-30 RX ORDER — SODIUM CHLORIDE 9 MG/ML
INJECTION, SOLUTION INTRAVENOUS PRN
Status: DISCONTINUED | OUTPATIENT
Start: 2025-07-30 | End: 2025-08-09 | Stop reason: HOSPADM

## 2025-07-30 RX ORDER — THIAMINE HYDROCHLORIDE 100 MG/ML
100 INJECTION, SOLUTION INTRAMUSCULAR; INTRAVENOUS DAILY
Status: DISCONTINUED | OUTPATIENT
Start: 2025-07-30 | End: 2025-07-31

## 2025-07-30 RX ORDER — SODIUM CHLORIDE 0.9 % (FLUSH) 0.9 %
5-40 SYRINGE (ML) INJECTION EVERY 12 HOURS SCHEDULED
Status: DISCONTINUED | OUTPATIENT
Start: 2025-07-30 | End: 2025-08-09 | Stop reason: HOSPADM

## 2025-07-30 RX ORDER — M-VIT,TX,IRON,MINS/CALC/FOLIC 27MG-0.4MG
1 TABLET ORAL DAILY
Status: DISCONTINUED | OUTPATIENT
Start: 2025-07-30 | End: 2025-08-09 | Stop reason: HOSPADM

## 2025-07-30 RX ORDER — IOPAMIDOL 755 MG/ML
75 INJECTION, SOLUTION INTRAVASCULAR
Status: COMPLETED | OUTPATIENT
Start: 2025-07-30 | End: 2025-07-30

## 2025-07-30 RX ORDER — DEXTROSE MONOHYDRATE AND SODIUM CHLORIDE 5; .45 G/100ML; G/100ML
INJECTION, SOLUTION INTRAVENOUS
Status: DISCONTINUED
Start: 2025-07-30 | End: 2025-07-31

## 2025-07-30 RX ORDER — ENOXAPARIN SODIUM 100 MG/ML
40 INJECTION SUBCUTANEOUS DAILY
Status: DISCONTINUED | OUTPATIENT
Start: 2025-07-31 | End: 2025-08-09 | Stop reason: HOSPADM

## 2025-07-30 RX ORDER — GAUZE BANDAGE 2" X 2"
100 BANDAGE TOPICAL DAILY
Status: DISCONTINUED | OUTPATIENT
Start: 2025-07-30 | End: 2025-07-30 | Stop reason: CLARIF

## 2025-07-30 RX ORDER — PHENOBARBITAL 32.4 MG/1
16.2 TABLET ORAL EVERY 6 HOURS PRN
Status: ACTIVE | OUTPATIENT
Start: 2025-08-01 | End: 2025-08-02

## 2025-07-30 RX ORDER — ACETAMINOPHEN 325 MG/1
650 TABLET ORAL EVERY 6 HOURS PRN
Status: DISCONTINUED | OUTPATIENT
Start: 2025-07-30 | End: 2025-08-09 | Stop reason: HOSPADM

## 2025-07-30 RX ADMIN — Medication 1000 ML: at 18:58

## 2025-07-30 RX ADMIN — ONDANSETRON 4 MG: 2 INJECTION, SOLUTION INTRAMUSCULAR; INTRAVENOUS at 23:29

## 2025-07-30 RX ADMIN — PHENOBARBITAL 32.4 MG: 32.4 TABLET ORAL at 21:30

## 2025-07-30 RX ADMIN — POTASSIUM CHLORIDE 10 MEQ: 7.46 INJECTION, SOLUTION INTRAVENOUS at 22:41

## 2025-07-30 RX ADMIN — THIAMINE HYDROCHLORIDE 100 MG: 100 INJECTION, SOLUTION INTRAMUSCULAR; INTRAVENOUS at 23:28

## 2025-07-30 RX ADMIN — FOLIC ACID 1 MG: 5 INJECTION, SOLUTION INTRAMUSCULAR; INTRAVENOUS; SUBCUTANEOUS at 23:31

## 2025-07-30 RX ADMIN — Medication 1 TABLET: at 23:25

## 2025-07-30 RX ADMIN — Medication 10 ML: at 21:30

## 2025-07-30 RX ADMIN — SODIUM CHLORIDE 0.5 UNITS/HR: 9 INJECTION, SOLUTION INTRAVENOUS at 21:23

## 2025-07-30 RX ADMIN — SODIUM CHLORIDE, PRESERVATIVE FREE 10 ML: 5 INJECTION INTRAVENOUS at 21:29

## 2025-07-30 RX ADMIN — IOPAMIDOL 75 ML: 755 INJECTION, SOLUTION INTRAVENOUS at 20:25

## 2025-07-30 RX ADMIN — ACETAMINOPHEN 650 MG: 325 TABLET ORAL at 23:25

## 2025-07-30 RX ADMIN — MAGNESIUM SULFATE HEPTAHYDRATE 2000 MG: 40 INJECTION, SOLUTION INTRAVENOUS at 22:47

## 2025-07-30 RX ADMIN — SODIUM CHLORIDE 1000 ML: 0.9 INJECTION, SOLUTION INTRAVENOUS at 21:27

## 2025-07-30 RX ADMIN — SODIUM CHLORIDE 1293 ML: 0.9 INJECTION, SOLUTION INTRAVENOUS at 21:28

## 2025-07-30 RX ADMIN — POTASSIUM CHLORIDE 10 MEQ: 7.46 INJECTION, SOLUTION INTRAVENOUS at 23:40

## 2025-07-30 RX ADMIN — DEXTROSE AND SODIUM CHLORIDE: 5; .45 INJECTION, SOLUTION INTRAVENOUS at 21:23

## 2025-07-30 RX ADMIN — SODIUM CHLORIDE 0.2 UNITS/HR: 9 INJECTION, SOLUTION INTRAVENOUS at 22:19

## 2025-07-30 ASSESSMENT — PAIN SCALES - GENERAL
PAINLEVEL_OUTOF10: 7
PAINLEVEL_OUTOF10: 7

## 2025-07-30 ASSESSMENT — PAIN DESCRIPTION - LOCATION: LOCATION: ABDOMEN

## 2025-07-30 ASSESSMENT — LIFESTYLE VARIABLES
HOW OFTEN DO YOU HAVE A DRINK CONTAINING ALCOHOL: 4 OR MORE TIMES A WEEK
HOW MANY STANDARD DRINKS CONTAINING ALCOHOL DO YOU HAVE ON A TYPICAL DAY: 7 TO 9

## 2025-07-30 ASSESSMENT — PAIN DESCRIPTION - ORIENTATION: ORIENTATION: LOWER;UPPER;MID;RIGHT;LEFT

## 2025-07-31 PROBLEM — F10.10 ALCOHOL ABUSE: Status: ACTIVE | Noted: 2025-07-31

## 2025-07-31 PROBLEM — D61.818 PANCYTOPENIA (HCC): Status: ACTIVE | Noted: 2025-07-31

## 2025-07-31 PROBLEM — E11.10 DIABETIC KETOACIDOSIS WITHOUT COMA ASSOCIATED WITH TYPE 2 DIABETES MELLITUS (HCC): Status: ACTIVE | Noted: 2025-07-31

## 2025-07-31 LAB
25(OH)D3 SERPL-MCNC: <6 NG/ML (ref 30–100)
AMPHET UR QL SCN: NEGATIVE
ANION GAP SERPL CALCULATED.3IONS-SCNC: 16 MMOL/L (ref 7–16)
ANION GAP SERPL CALCULATED.3IONS-SCNC: 17 MMOL/L (ref 7–16)
ANION GAP SERPL CALCULATED.3IONS-SCNC: 24 MMOL/L (ref 7–16)
B-OH-BUTYR SERPL-MCNC: 4.49 MMOL/L (ref 0.02–0.27)
BACTERIA URNS QL MICRO: ABNORMAL
BARBITURATES UR QL SCN: POSITIVE
BASOPHILS # BLD: 0 K/UL (ref 0–0.2)
BASOPHILS NFR BLD: 0 % (ref 0–2)
BENZODIAZ UR QL: NEGATIVE
BILIRUB UR QL STRIP: ABNORMAL
BUN SERPL-MCNC: 10 MG/DL (ref 6–20)
BUN SERPL-MCNC: 6 MG/DL (ref 6–20)
BUN SERPL-MCNC: 7 MG/DL (ref 6–20)
BUPRENORPHINE UR QL: NEGATIVE
CALCIUM SERPL-MCNC: 7.3 MG/DL (ref 8.6–10)
CALCIUM SERPL-MCNC: 7.6 MG/DL (ref 8.6–10)
CALCIUM SERPL-MCNC: 7.7 MG/DL (ref 8.6–10)
CANNABINOIDS UR QL SCN: NEGATIVE
CHLORIDE SERPL-SCNC: 94 MMOL/L (ref 98–107)
CHLORIDE SERPL-SCNC: 97 MMOL/L (ref 98–107)
CHLORIDE SERPL-SCNC: 98 MMOL/L (ref 98–107)
CLARITY UR: CLEAR
CO2 SERPL-SCNC: 15 MMOL/L (ref 22–29)
CO2 SERPL-SCNC: 21 MMOL/L (ref 22–29)
CO2 SERPL-SCNC: 22 MMOL/L (ref 22–29)
COCAINE UR QL SCN: NEGATIVE
COLOR UR: YELLOW
CREAT SERPL-MCNC: 0.5 MG/DL (ref 0.7–1.2)
CREAT SERPL-MCNC: 0.6 MG/DL (ref 0.7–1.2)
CREAT SERPL-MCNC: 0.7 MG/DL (ref 0.7–1.2)
EKG ATRIAL RATE: 90 BPM
EKG P AXIS: 33 DEGREES
EKG P-R INTERVAL: 152 MS
EKG Q-T INTERVAL: 368 MS
EKG QRS DURATION: 72 MS
EKG QTC CALCULATION (BAZETT): 450 MS
EKG R AXIS: 10 DEGREES
EKG T AXIS: -26 DEGREES
EKG VENTRICULAR RATE: 90 BPM
EOSINOPHIL # BLD: 0 K/UL (ref 0.05–0.5)
EOSINOPHILS RELATIVE PERCENT: 0 % (ref 0–6)
ERYTHROCYTE [DISTWIDTH] IN BLOOD BY AUTOMATED COUNT: 13.2 % (ref 11.5–15)
FENTANYL UR QL: NEGATIVE
FOLATE SERPL-MCNC: 8.8 NG/ML (ref 4.6–34.8)
GFR, ESTIMATED: >90 ML/MIN/1.73M2
GLUCOSE BLD-MCNC: 187 MG/DL (ref 74–99)
GLUCOSE BLD-MCNC: 188 MG/DL (ref 74–99)
GLUCOSE BLD-MCNC: 198 MG/DL (ref 74–99)
GLUCOSE BLD-MCNC: 199 MG/DL (ref 74–99)
GLUCOSE BLD-MCNC: 201 MG/DL (ref 74–99)
GLUCOSE BLD-MCNC: 202 MG/DL (ref 74–99)
GLUCOSE BLD-MCNC: 205 MG/DL (ref 74–99)
GLUCOSE BLD-MCNC: 206 MG/DL (ref 74–99)
GLUCOSE BLD-MCNC: 207 MG/DL (ref 74–99)
GLUCOSE BLD-MCNC: 208 MG/DL (ref 74–99)
GLUCOSE BLD-MCNC: 209 MG/DL (ref 74–99)
GLUCOSE BLD-MCNC: 221 MG/DL (ref 74–99)
GLUCOSE BLD-MCNC: 226 MG/DL (ref 74–99)
GLUCOSE BLD-MCNC: 228 MG/DL (ref 74–99)
GLUCOSE BLD-MCNC: 245 MG/DL (ref 74–99)
GLUCOSE BLD-MCNC: 255 MG/DL (ref 74–99)
GLUCOSE BLD-MCNC: 258 MG/DL (ref 74–99)
GLUCOSE BLD-MCNC: 263 MG/DL (ref 74–99)
GLUCOSE BLD-MCNC: 330 MG/DL (ref 74–99)
GLUCOSE SERPL-MCNC: 210 MG/DL (ref 74–99)
GLUCOSE SERPL-MCNC: 223 MG/DL (ref 74–99)
GLUCOSE SERPL-MCNC: 238 MG/DL (ref 74–99)
GLUCOSE UR STRIP-MCNC: 100 MG/DL
HBA1C MFR BLD: 5.2 % (ref 4–5.6)
HCT VFR BLD AUTO: 24 % (ref 37–54)
HGB BLD-MCNC: 8 G/DL (ref 12.5–16.5)
HGB UR QL STRIP.AUTO: ABNORMAL
KETONES UR STRIP-MCNC: 40 MG/DL
LACTATE BLDV-SCNC: 0.9 MMOL/L (ref 0.5–2.2)
LEUKOCYTE ESTERASE UR QL STRIP: NEGATIVE
LYMPHOCYTES NFR BLD: 0.15 K/UL (ref 1.5–4)
LYMPHOCYTES RELATIVE PERCENT: 4 % (ref 20–42)
MAGNESIUM SERPL-MCNC: 1.5 MG/DL (ref 1.6–2.6)
MAGNESIUM SERPL-MCNC: 1.6 MG/DL (ref 1.6–2.6)
MAGNESIUM SERPL-MCNC: 2.1 MG/DL (ref 1.6–2.6)
MCH RBC QN AUTO: 34 PG (ref 26–35)
MCHC RBC AUTO-ENTMCNC: 33.3 G/DL (ref 32–34.5)
MCV RBC AUTO: 102.1 FL (ref 80–99.9)
METHADONE UR QL: NEGATIVE
MONOCYTES NFR BLD: 0.18 K/UL (ref 0.1–0.95)
MONOCYTES NFR BLD: 4 % (ref 2–12)
MYELOCYTES ABSOLUTE COUNT: 0.04 K/UL
MYELOCYTES: 1 %
NEUTROPHILS NFR BLD: 91 % (ref 43–80)
NEUTS SEG NFR BLD: 3.83 K/UL (ref 1.8–7.3)
NITRITE UR QL STRIP: POSITIVE
NUCLEATED RED BLOOD CELLS: 2 PER 100 WBC
OPIATES UR QL SCN: NEGATIVE
OXYCODONE UR QL SCN: NEGATIVE
PCP UR QL SCN: NEGATIVE
PH UR STRIP: 5.5 [PH] (ref 5–8)
PHOSPHATE SERPL-MCNC: 1.2 MG/DL (ref 2.5–4.5)
PHOSPHATE SERPL-MCNC: 1.6 MG/DL (ref 2.5–4.5)
PHOSPHATE SERPL-MCNC: 1.6 MG/DL (ref 2.5–4.5)
PLATELET # BLD AUTO: 114 K/UL (ref 130–450)
PMV BLD AUTO: 9.4 FL (ref 7–12)
POTASSIUM SERPL-SCNC: 3.4 MMOL/L (ref 3.5–5.1)
POTASSIUM SERPL-SCNC: 3.7 MMOL/L (ref 3.5–5.1)
POTASSIUM SERPL-SCNC: 3.8 MMOL/L (ref 3.5–5.1)
PROT UR STRIP-MCNC: 30 MG/DL
RBC # BLD AUTO: 2.35 M/UL (ref 3.8–5.8)
RBC # BLD: ABNORMAL 10*6/UL
RBC #/AREA URNS HPF: ABNORMAL /HPF
SODIUM SERPL-SCNC: 133 MMOL/L (ref 136–145)
SODIUM SERPL-SCNC: 134 MMOL/L (ref 136–145)
SODIUM SERPL-SCNC: 135 MMOL/L (ref 136–145)
SP GR UR STRIP: 1.02 (ref 1–1.03)
TEST INFORMATION: ABNORMAL
TROPONIN I SERPL HS-MCNC: 33 NG/L (ref 0–22)
UROBILINOGEN UR STRIP-ACNC: 4 EU/DL (ref 0–1)
VIT B12 SERPL-MCNC: 706 PG/ML (ref 232–1245)
WBC #/AREA URNS HPF: ABNORMAL /HPF
WBC OTHER # BLD: 4.2 K/UL (ref 4.5–11.5)

## 2025-07-31 PROCEDURE — 2580000003 HC RX 258: Performed by: PHYSICIAN ASSISTANT

## 2025-07-31 PROCEDURE — 6360000002 HC RX W HCPCS: Performed by: PHYSICIAN ASSISTANT

## 2025-07-31 PROCEDURE — 83605 ASSAY OF LACTIC ACID: CPT

## 2025-07-31 PROCEDURE — 84484 ASSAY OF TROPONIN QUANT: CPT

## 2025-07-31 PROCEDURE — 82962 GLUCOSE BLOOD TEST: CPT

## 2025-07-31 PROCEDURE — 2500000003 HC RX 250 WO HCPCS: Performed by: FAMILY MEDICINE

## 2025-07-31 PROCEDURE — 83735 ASSAY OF MAGNESIUM: CPT

## 2025-07-31 PROCEDURE — 2580000003 HC RX 258

## 2025-07-31 PROCEDURE — 82010 KETONE BODYS QUAN: CPT

## 2025-07-31 PROCEDURE — 84100 ASSAY OF PHOSPHORUS: CPT

## 2025-07-31 PROCEDURE — 82306 VITAMIN D 25 HYDROXY: CPT

## 2025-07-31 PROCEDURE — 85025 COMPLETE CBC W/AUTO DIFF WBC: CPT

## 2025-07-31 PROCEDURE — 82746 ASSAY OF FOLIC ACID SERUM: CPT

## 2025-07-31 PROCEDURE — 87081 CULTURE SCREEN ONLY: CPT

## 2025-07-31 PROCEDURE — 82607 VITAMIN B-12: CPT

## 2025-07-31 PROCEDURE — 6360000002 HC RX W HCPCS

## 2025-07-31 PROCEDURE — 99232 SBSQ HOSP IP/OBS MODERATE 35: CPT | Performed by: INTERNAL MEDICINE

## 2025-07-31 PROCEDURE — 2500000003 HC RX 250 WO HCPCS: Performed by: PHYSICIAN ASSISTANT

## 2025-07-31 PROCEDURE — 80048 BASIC METABOLIC PNL TOTAL CA: CPT

## 2025-07-31 PROCEDURE — 93010 ELECTROCARDIOGRAM REPORT: CPT | Performed by: INTERNAL MEDICINE

## 2025-07-31 PROCEDURE — 6370000000 HC RX 637 (ALT 250 FOR IP): Performed by: INTERNAL MEDICINE

## 2025-07-31 PROCEDURE — 6360000002 HC RX W HCPCS: Performed by: FAMILY MEDICINE

## 2025-07-31 PROCEDURE — 6370000000 HC RX 637 (ALT 250 FOR IP): Performed by: PHYSICIAN ASSISTANT

## 2025-07-31 PROCEDURE — 2500000003 HC RX 250 WO HCPCS

## 2025-07-31 PROCEDURE — 2000000000 HC ICU R&B

## 2025-07-31 PROCEDURE — 2580000003 HC RX 258: Performed by: INTERNAL MEDICINE

## 2025-07-31 PROCEDURE — 99291 CRITICAL CARE FIRST HOUR: CPT | Performed by: INTERNAL MEDICINE

## 2025-07-31 PROCEDURE — 81001 URINALYSIS AUTO W/SCOPE: CPT

## 2025-07-31 PROCEDURE — 80307 DRUG TEST PRSMV CHEM ANLYZR: CPT

## 2025-07-31 RX ORDER — DEXTROSE MONOHYDRATE AND SODIUM CHLORIDE 5; .45 G/100ML; G/100ML
INJECTION, SOLUTION INTRAVENOUS
Status: DISCONTINUED
Start: 2025-07-31 | End: 2025-07-31

## 2025-07-31 RX ORDER — DEXTROSE MONOHYDRATE AND SODIUM CHLORIDE 5; .45 G/100ML; G/100ML
INJECTION, SOLUTION INTRAVENOUS
Status: COMPLETED
Start: 2025-07-31 | End: 2025-07-31

## 2025-07-31 RX ORDER — GABAPENTIN 400 MG/1
400 CAPSULE ORAL 3 TIMES DAILY
Status: DISCONTINUED | OUTPATIENT
Start: 2025-07-31 | End: 2025-08-09 | Stop reason: HOSPADM

## 2025-07-31 RX ORDER — DEXTROSE MONOHYDRATE 100 MG/ML
INJECTION, SOLUTION INTRAVENOUS CONTINUOUS
Status: DISCONTINUED | OUTPATIENT
Start: 2025-07-31 | End: 2025-08-01

## 2025-07-31 RX ORDER — DIAZEPAM 2 MG/1
2 TABLET ORAL EVERY 6 HOURS
Status: DISCONTINUED | OUTPATIENT
Start: 2025-07-31 | End: 2025-08-01

## 2025-07-31 RX ORDER — DEXTROSE MONOHYDRATE AND SODIUM CHLORIDE 5; .9 G/100ML; G/100ML
INJECTION, SOLUTION INTRAVENOUS
Status: DISCONTINUED
Start: 2025-07-31 | End: 2025-07-31

## 2025-07-31 RX ORDER — DEXMEDETOMIDINE HYDROCHLORIDE 4 UG/ML
.1-1.5 INJECTION, SOLUTION INTRAVENOUS CONTINUOUS
Status: DISCONTINUED | OUTPATIENT
Start: 2025-07-31 | End: 2025-08-01

## 2025-07-31 RX ORDER — FOLIC ACID 1 MG/1
1 TABLET ORAL DAILY
Status: DISCONTINUED | OUTPATIENT
Start: 2025-07-31 | End: 2025-08-09 | Stop reason: HOSPADM

## 2025-07-31 RX ORDER — GAUZE BANDAGE 2" X 2"
500 BANDAGE TOPICAL EVERY 8 HOURS
Status: DISCONTINUED | OUTPATIENT
Start: 2025-07-31 | End: 2025-08-09 | Stop reason: HOSPADM

## 2025-07-31 RX ADMIN — DEXTROSE MONOHYDRATE: 100 INJECTION, SOLUTION INTRAVENOUS at 16:34

## 2025-07-31 RX ADMIN — POTASSIUM CHLORIDE 10 MEQ: 7.46 INJECTION, SOLUTION INTRAVENOUS at 15:45

## 2025-07-31 RX ADMIN — THIAMINE HYDROCHLORIDE 100 MG: 100 INJECTION, SOLUTION INTRAMUSCULAR; INTRAVENOUS at 10:03

## 2025-07-31 RX ADMIN — POTASSIUM CHLORIDE 10 MEQ: 7.46 INJECTION, SOLUTION INTRAVENOUS at 06:41

## 2025-07-31 RX ADMIN — FOLIC ACID 1 MG: 1 TABLET ORAL at 14:54

## 2025-07-31 RX ADMIN — SODIUM PHOSPHATE, MONOBASIC, MONOHYDRATE AND SODIUM PHOSPHATE, DIBASIC, ANHYDROUS 15 MMOL: 142; 276 INJECTION, SOLUTION INTRAVENOUS at 15:51

## 2025-07-31 RX ADMIN — Medication 1 TABLET: at 10:02

## 2025-07-31 RX ADMIN — SODIUM PHOSPHATE, MONOBASIC, MONOHYDRATE AND SODIUM PHOSPHATE, DIBASIC, ANHYDROUS 15 MMOL: 142; 276 INJECTION, SOLUTION INTRAVENOUS at 20:27

## 2025-07-31 RX ADMIN — DIAZEPAM 2 MG: 2 TABLET ORAL at 14:53

## 2025-07-31 RX ADMIN — POTASSIUM CHLORIDE 10 MEQ: 7.46 INJECTION, SOLUTION INTRAVENOUS at 04:10

## 2025-07-31 RX ADMIN — SODIUM CHLORIDE, PRESERVATIVE FREE 10 ML: 5 INJECTION INTRAVENOUS at 10:04

## 2025-07-31 RX ADMIN — MAGNESIUM SULFATE HEPTAHYDRATE 2000 MG: 40 INJECTION, SOLUTION INTRAVENOUS at 20:26

## 2025-07-31 RX ADMIN — PHENOBARBITAL 32.4 MG: 32.4 TABLET ORAL at 14:52

## 2025-07-31 RX ADMIN — PHENOBARBITAL 32.4 MG: 32.4 TABLET ORAL at 20:34

## 2025-07-31 RX ADMIN — GABAPENTIN 400 MG: 400 CAPSULE ORAL at 20:33

## 2025-07-31 RX ADMIN — DEXTROSE AND SODIUM CHLORIDE: 5; .45 INJECTION, SOLUTION INTRAVENOUS at 12:22

## 2025-07-31 RX ADMIN — POTASSIUM CHLORIDE 10 MEQ: 7.46 INJECTION, SOLUTION INTRAVENOUS at 21:26

## 2025-07-31 RX ADMIN — SODIUM CHLORIDE 0.7 UNITS/HR: 9 INJECTION, SOLUTION INTRAVENOUS at 16:43

## 2025-07-31 RX ADMIN — SODIUM PHOSPHATE, MONOBASIC, MONOHYDRATE AND SODIUM PHOSPHATE, DIBASIC, ANHYDROUS 15 MMOL: 142; 276 INJECTION, SOLUTION INTRAVENOUS at 02:24

## 2025-07-31 RX ADMIN — ENOXAPARIN SODIUM 40 MG: 100 INJECTION SUBCUTANEOUS at 10:03

## 2025-07-31 RX ADMIN — ONDANSETRON 4 MG: 2 INJECTION, SOLUTION INTRAMUSCULAR; INTRAVENOUS at 16:35

## 2025-07-31 RX ADMIN — Medication 500 MG: at 20:33

## 2025-07-31 RX ADMIN — PHENOBARBITAL 32.4 MG: 32.4 TABLET ORAL at 16:34

## 2025-07-31 RX ADMIN — DIAZEPAM 2 MG: 2 TABLET ORAL at 16:34

## 2025-07-31 RX ADMIN — POTASSIUM CHLORIDE 10 MEQ: 7.46 INJECTION, SOLUTION INTRAVENOUS at 22:27

## 2025-07-31 RX ADMIN — PANTOPRAZOLE SODIUM 40 MG: 40 INJECTION, POWDER, FOR SOLUTION INTRAVENOUS at 10:03

## 2025-07-31 RX ADMIN — POTASSIUM CHLORIDE 10 MEQ: 7.46 INJECTION, SOLUTION INTRAVENOUS at 14:51

## 2025-07-31 RX ADMIN — GABAPENTIN 400 MG: 400 CAPSULE ORAL at 14:52

## 2025-07-31 RX ADMIN — MAGNESIUM SULFATE HEPTAHYDRATE 2000 MG: 40 INJECTION, SOLUTION INTRAVENOUS at 14:50

## 2025-07-31 RX ADMIN — Medication 10 ML: at 10:05

## 2025-07-31 RX ADMIN — MAGNESIUM SULFATE HEPTAHYDRATE 2000 MG: 40 INJECTION, SOLUTION INTRAVENOUS at 00:44

## 2025-07-31 RX ADMIN — DEXTROSE AND SODIUM CHLORIDE: 5; .45 INJECTION, SOLUTION INTRAVENOUS at 19:12

## 2025-07-31 RX ADMIN — PHENOBARBITAL 32.4 MG: 32.4 TABLET ORAL at 10:02

## 2025-07-31 RX ADMIN — POTASSIUM CHLORIDE 10 MEQ: 7.46 INJECTION, SOLUTION INTRAVENOUS at 16:43

## 2025-07-31 RX ADMIN — POTASSIUM CHLORIDE 10 MEQ: 7.46 INJECTION, SOLUTION INTRAVENOUS at 05:24

## 2025-07-31 RX ADMIN — Medication 500 MG: at 14:53

## 2025-07-31 RX ADMIN — POTASSIUM CHLORIDE 10 MEQ: 7.46 INJECTION, SOLUTION INTRAVENOUS at 00:47

## 2025-07-31 RX ADMIN — Medication 10 ML: at 20:34

## 2025-07-31 RX ADMIN — POTASSIUM CHLORIDE 10 MEQ: 7.46 INJECTION, SOLUTION INTRAVENOUS at 20:25

## 2025-07-31 ASSESSMENT — PAIN SCALES - GENERAL: PAINLEVEL_OUTOF10: 4

## 2025-08-01 PROBLEM — E83.39 HYPOPHOSPHATEMIA: Status: ACTIVE | Noted: 2025-08-01

## 2025-08-01 LAB
ALBUMIN SERPL-MCNC: 3 G/DL (ref 3.5–5.2)
ALP SERPL-CCNC: 122 U/L (ref 40–129)
ALT SERPL-CCNC: 29 U/L (ref 0–50)
AMMONIA PLAS-SCNC: 60 UMOL/L (ref 16–60)
ANION GAP SERPL CALCULATED.3IONS-SCNC: 11 MMOL/L (ref 7–16)
ANION GAP SERPL CALCULATED.3IONS-SCNC: 11 MMOL/L (ref 7–16)
ANION GAP SERPL CALCULATED.3IONS-SCNC: 12 MMOL/L (ref 7–16)
AST SERPL-CCNC: 114 U/L (ref 0–50)
B-OH-BUTYR SERPL-MCNC: 0.16 MMOL/L (ref 0.02–0.27)
BASOPHILS # BLD: 0.1 K/UL (ref 0–0.2)
BASOPHILS NFR BLD: 3 % (ref 0–2)
BILIRUB DIRECT SERPL-MCNC: 1 MG/DL (ref 0–0.2)
BILIRUB INDIRECT SERPL-MCNC: 0.5 MG/DL (ref 0–1)
BILIRUB SERPL-MCNC: 1.5 MG/DL (ref 0–1.2)
BUN SERPL-MCNC: 4 MG/DL (ref 6–20)
BUN SERPL-MCNC: 5 MG/DL (ref 6–20)
BUN SERPL-MCNC: 5 MG/DL (ref 6–20)
CALCIUM SERPL-MCNC: 7.1 MG/DL (ref 8.6–10)
CALCIUM SERPL-MCNC: 7.3 MG/DL (ref 8.6–10)
CALCIUM SERPL-MCNC: 7.3 MG/DL (ref 8.6–10)
CHLORIDE SERPL-SCNC: 95 MMOL/L (ref 98–107)
CHLORIDE SERPL-SCNC: 98 MMOL/L (ref 98–107)
CHLORIDE SERPL-SCNC: 99 MMOL/L (ref 98–107)
CO2 SERPL-SCNC: 22 MMOL/L (ref 22–29)
CO2 SERPL-SCNC: 23 MMOL/L (ref 22–29)
CO2 SERPL-SCNC: 24 MMOL/L (ref 22–29)
CREAT SERPL-MCNC: 0.6 MG/DL (ref 0.7–1.2)
EOSINOPHIL # BLD: 0.17 K/UL (ref 0.05–0.5)
EOSINOPHILS RELATIVE PERCENT: 5 % (ref 0–6)
ERYTHROCYTE [DISTWIDTH] IN BLOOD BY AUTOMATED COUNT: 13.2 % (ref 11.5–15)
GFR, ESTIMATED: >90 ML/MIN/1.73M2
GLUCOSE BLD-MCNC: 145 MG/DL (ref 74–99)
GLUCOSE BLD-MCNC: 145 MG/DL (ref 74–99)
GLUCOSE BLD-MCNC: 163 MG/DL (ref 74–99)
GLUCOSE BLD-MCNC: 163 MG/DL (ref 74–99)
GLUCOSE BLD-MCNC: 164 MG/DL (ref 74–99)
GLUCOSE BLD-MCNC: 173 MG/DL (ref 74–99)
GLUCOSE BLD-MCNC: 187 MG/DL (ref 74–99)
GLUCOSE BLD-MCNC: 190 MG/DL (ref 74–99)
GLUCOSE BLD-MCNC: 194 MG/DL (ref 74–99)
GLUCOSE BLD-MCNC: 213 MG/DL (ref 74–99)
GLUCOSE BLD-MCNC: 213 MG/DL (ref 74–99)
GLUCOSE BLD-MCNC: 221 MG/DL (ref 74–99)
GLUCOSE BLD-MCNC: 255 MG/DL (ref 74–99)
GLUCOSE SERPL-MCNC: 174 MG/DL (ref 74–99)
GLUCOSE SERPL-MCNC: 224 MG/DL (ref 74–99)
GLUCOSE SERPL-MCNC: 333 MG/DL (ref 74–99)
HCT VFR BLD AUTO: 22.1 % (ref 37–54)
HGB BLD-MCNC: 7.7 G/DL (ref 12.5–16.5)
INR PPP: 1.1
LYMPHOCYTES NFR BLD: 0.2 K/UL (ref 1.5–4)
LYMPHOCYTES RELATIVE PERCENT: 5 % (ref 20–42)
MAGNESIUM SERPL-MCNC: 1.5 MG/DL (ref 1.6–2.6)
MAGNESIUM SERPL-MCNC: 1.7 MG/DL (ref 1.6–2.6)
MAGNESIUM SERPL-MCNC: 1.8 MG/DL (ref 1.6–2.6)
MCH RBC QN AUTO: 34.4 PG (ref 26–35)
MCHC RBC AUTO-ENTMCNC: 34.8 G/DL (ref 32–34.5)
MCV RBC AUTO: 98.7 FL (ref 80–99.9)
MICROORGANISM SPEC CULT: NORMAL
MONOCYTES NFR BLD: 0.07 K/UL (ref 0.1–0.95)
MONOCYTES NFR BLD: 2 % (ref 2–12)
MYELOCYTES ABSOLUTE COUNT: 0.03 K/UL
MYELOCYTES: 1 %
NEUTROPHILS NFR BLD: 85 % (ref 43–80)
NEUTS SEG NFR BLD: 3.22 K/UL (ref 1.8–7.3)
NUCLEATED RED BLOOD CELLS: 2 PER 100 WBC
PARTIAL THROMBOPLASTIN TIME: 30.7 SEC (ref 24.5–35.1)
PHOSPHATE SERPL-MCNC: 1.5 MG/DL (ref 2.5–4.5)
PHOSPHATE SERPL-MCNC: 2.1 MG/DL (ref 2.5–4.5)
PHOSPHATE SERPL-MCNC: 2.3 MG/DL (ref 2.5–4.5)
PLATELET, FLUORESCENCE: 125 K/UL (ref 130–450)
PMV BLD AUTO: 8.6 FL (ref 7–12)
POTASSIUM SERPL-SCNC: 3.5 MMOL/L (ref 3.5–5.1)
PROT SERPL-MCNC: 5.4 G/DL (ref 6.4–8.3)
PROTHROMBIN TIME: 12.1 SEC (ref 9.3–12.4)
RBC # BLD AUTO: 2.24 M/UL (ref 3.8–5.8)
RBC # BLD: ABNORMAL 10*6/UL
SODIUM SERPL-SCNC: 130 MMOL/L (ref 136–145)
SODIUM SERPL-SCNC: 132 MMOL/L (ref 136–145)
SODIUM SERPL-SCNC: 133 MMOL/L (ref 136–145)
SPECIMEN DESCRIPTION: NORMAL
WBC OTHER # BLD: 3.8 K/UL (ref 4.5–11.5)

## 2025-08-01 PROCEDURE — 2500000003 HC RX 250 WO HCPCS

## 2025-08-01 PROCEDURE — 82010 KETONE BODYS QUAN: CPT

## 2025-08-01 PROCEDURE — 2580000003 HC RX 258

## 2025-08-01 PROCEDURE — 84100 ASSAY OF PHOSPHORUS: CPT

## 2025-08-01 PROCEDURE — 82962 GLUCOSE BLOOD TEST: CPT

## 2025-08-01 PROCEDURE — 6370000000 HC RX 637 (ALT 250 FOR IP)

## 2025-08-01 PROCEDURE — 80048 BASIC METABOLIC PNL TOTAL CA: CPT

## 2025-08-01 PROCEDURE — 99232 SBSQ HOSP IP/OBS MODERATE 35: CPT | Performed by: INTERNAL MEDICINE

## 2025-08-01 PROCEDURE — 82140 ASSAY OF AMMONIA: CPT

## 2025-08-01 PROCEDURE — 82248 BILIRUBIN DIRECT: CPT

## 2025-08-01 PROCEDURE — 85730 THROMBOPLASTIN TIME PARTIAL: CPT

## 2025-08-01 PROCEDURE — 2000000000 HC ICU R&B

## 2025-08-01 PROCEDURE — 85025 COMPLETE CBC W/AUTO DIFF WBC: CPT

## 2025-08-01 PROCEDURE — 2500000003 HC RX 250 WO HCPCS: Performed by: INTERNAL MEDICINE

## 2025-08-01 PROCEDURE — 99291 CRITICAL CARE FIRST HOUR: CPT | Performed by: INTERNAL MEDICINE

## 2025-08-01 PROCEDURE — 6370000000 HC RX 637 (ALT 250 FOR IP): Performed by: FAMILY MEDICINE

## 2025-08-01 PROCEDURE — 6370000000 HC RX 637 (ALT 250 FOR IP): Performed by: PHYSICIAN ASSISTANT

## 2025-08-01 PROCEDURE — 2500000003 HC RX 250 WO HCPCS: Performed by: FAMILY MEDICINE

## 2025-08-01 PROCEDURE — 6370000000 HC RX 637 (ALT 250 FOR IP): Performed by: INTERNAL MEDICINE

## 2025-08-01 PROCEDURE — 6360000002 HC RX W HCPCS: Performed by: FAMILY MEDICINE

## 2025-08-01 PROCEDURE — 83735 ASSAY OF MAGNESIUM: CPT

## 2025-08-01 PROCEDURE — 6360000002 HC RX W HCPCS: Performed by: PHYSICIAN ASSISTANT

## 2025-08-01 PROCEDURE — 6360000002 HC RX W HCPCS

## 2025-08-01 PROCEDURE — 85610 PROTHROMBIN TIME: CPT

## 2025-08-01 PROCEDURE — 6360000002 HC RX W HCPCS: Performed by: INTERNAL MEDICINE

## 2025-08-01 PROCEDURE — 2580000003 HC RX 258: Performed by: INTERNAL MEDICINE

## 2025-08-01 PROCEDURE — 80053 COMPREHEN METABOLIC PANEL: CPT

## 2025-08-01 PROCEDURE — 2580000003 HC RX 258: Performed by: PHYSICIAN ASSISTANT

## 2025-08-01 RX ORDER — POTASSIUM CHLORIDE 7.45 MG/ML
10 INJECTION INTRAVENOUS
Status: COMPLETED | OUTPATIENT
Start: 2025-08-01 | End: 2025-08-01

## 2025-08-01 RX ORDER — POLYETHYLENE GLYCOL 3350 17 G/17G
17 POWDER, FOR SOLUTION ORAL DAILY PRN
Status: DISCONTINUED | OUTPATIENT
Start: 2025-08-01 | End: 2025-08-09 | Stop reason: HOSPADM

## 2025-08-01 RX ORDER — DIAZEPAM 5 MG/1
5 TABLET ORAL EVERY 6 HOURS
Status: DISCONTINUED | OUTPATIENT
Start: 2025-08-01 | End: 2025-08-05

## 2025-08-01 RX ORDER — SODIUM CHLORIDE 9 MG/ML
INJECTION, SOLUTION INTRAVENOUS CONTINUOUS
Status: DISCONTINUED | OUTPATIENT
Start: 2025-08-01 | End: 2025-08-04

## 2025-08-01 RX ORDER — GLUCAGON 1 MG/ML
1 KIT INJECTION PRN
Status: DISCONTINUED | OUTPATIENT
Start: 2025-08-01 | End: 2025-08-09 | Stop reason: HOSPADM

## 2025-08-01 RX ORDER — MAGNESIUM SULFATE 1 G/100ML
1000 INJECTION INTRAVENOUS ONCE
Status: DISCONTINUED | OUTPATIENT
Start: 2025-08-01 | End: 2025-08-01

## 2025-08-01 RX ORDER — MIDAZOLAM HYDROCHLORIDE 1 MG/ML
1 INJECTION, SOLUTION INTRAMUSCULAR; INTRAVENOUS EVERY 4 HOURS PRN
Status: DISCONTINUED | OUTPATIENT
Start: 2025-08-01 | End: 2025-08-09 | Stop reason: HOSPADM

## 2025-08-01 RX ORDER — DEXTROSE MONOHYDRATE 100 MG/ML
INJECTION, SOLUTION INTRAVENOUS CONTINUOUS PRN
Status: DISCONTINUED | OUTPATIENT
Start: 2025-08-01 | End: 2025-08-09 | Stop reason: HOSPADM

## 2025-08-01 RX ORDER — INSULIN LISPRO 100 [IU]/ML
0-8 INJECTION, SOLUTION INTRAVENOUS; SUBCUTANEOUS
Status: DISCONTINUED | OUTPATIENT
Start: 2025-08-01 | End: 2025-08-09 | Stop reason: HOSPADM

## 2025-08-01 RX ORDER — MAGNESIUM SULFATE IN WATER 40 MG/ML
4000 INJECTION, SOLUTION INTRAVENOUS ONCE
Status: COMPLETED | OUTPATIENT
Start: 2025-08-01 | End: 2025-08-01

## 2025-08-01 RX ORDER — KETOROLAC TROMETHAMINE 15 MG/ML
15 INJECTION, SOLUTION INTRAMUSCULAR; INTRAVENOUS EVERY 6 HOURS PRN
Status: DISPENSED | OUTPATIENT
Start: 2025-08-01 | End: 2025-08-06

## 2025-08-01 RX ADMIN — PHENOBARBITAL 16.2 MG: 32.4 TABLET ORAL at 21:14

## 2025-08-01 RX ADMIN — SODIUM CHLORIDE: 0.9 INJECTION, SOLUTION INTRAVENOUS at 13:23

## 2025-08-01 RX ADMIN — DIAZEPAM 5 MG: 5 TABLET ORAL at 17:58

## 2025-08-01 RX ADMIN — DIAZEPAM 2 MG: 2 TABLET ORAL at 00:48

## 2025-08-01 RX ADMIN — GABAPENTIN 400 MG: 400 CAPSULE ORAL at 08:09

## 2025-08-01 RX ADMIN — GABAPENTIN 400 MG: 400 CAPSULE ORAL at 13:29

## 2025-08-01 RX ADMIN — INSULIN LISPRO 4 UNITS: 100 INJECTION, SOLUTION INTRAVENOUS; SUBCUTANEOUS at 18:00

## 2025-08-01 RX ADMIN — Medication 500 MG: at 06:34

## 2025-08-01 RX ADMIN — POTASSIUM CHLORIDE 10 MEQ: 7.46 INJECTION, SOLUTION INTRAVENOUS at 15:31

## 2025-08-01 RX ADMIN — POTASSIUM CHLORIDE 10 MEQ: 7.46 INJECTION, SOLUTION INTRAVENOUS at 08:07

## 2025-08-01 RX ADMIN — POTASSIUM PHOSPHATE, MONOBASIC POTASSIUM PHOSPHATE, DIBASIC 30 MMOL: 224; 236 INJECTION, SOLUTION, CONCENTRATE INTRAVENOUS at 13:27

## 2025-08-01 RX ADMIN — PANTOPRAZOLE SODIUM 40 MG: 40 INJECTION, POWDER, FOR SOLUTION INTRAVENOUS at 08:08

## 2025-08-01 RX ADMIN — POTASSIUM CHLORIDE 10 MEQ: 7.46 INJECTION, SOLUTION INTRAVENOUS at 00:46

## 2025-08-01 RX ADMIN — FOLIC ACID 1 MG: 1 TABLET ORAL at 08:08

## 2025-08-01 RX ADMIN — ENOXAPARIN SODIUM 40 MG: 100 INJECTION SUBCUTANEOUS at 08:08

## 2025-08-01 RX ADMIN — POLYETHYLENE GLYCOL 3350 17 G: 17 POWDER, FOR SOLUTION ORAL at 21:17

## 2025-08-01 RX ADMIN — POTASSIUM CHLORIDE 10 MEQ: 7.46 INJECTION, SOLUTION INTRAVENOUS at 13:24

## 2025-08-01 RX ADMIN — INSULIN LISPRO 2 UNITS: 100 INJECTION, SOLUTION INTRAVENOUS; SUBCUTANEOUS at 21:14

## 2025-08-01 RX ADMIN — ACETAMINOPHEN 650 MG: 325 TABLET ORAL at 09:05

## 2025-08-01 RX ADMIN — Medication 1 TABLET: at 08:08

## 2025-08-01 RX ADMIN — GABAPENTIN 400 MG: 400 CAPSULE ORAL at 21:15

## 2025-08-01 RX ADMIN — SODIUM PHOSPHATE, MONOBASIC, MONOHYDRATE AND SODIUM PHOSPHATE, DIBASIC, ANHYDROUS 15 MMOL: 142; 276 INJECTION, SOLUTION INTRAVENOUS at 06:34

## 2025-08-01 RX ADMIN — POTASSIUM CHLORIDE 10 MEQ: 7.46 INJECTION, SOLUTION INTRAVENOUS at 01:49

## 2025-08-01 RX ADMIN — SODIUM CHLORIDE: 0.9 INJECTION, SOLUTION INTRAVENOUS at 23:03

## 2025-08-01 RX ADMIN — DIAZEPAM 5 MG: 5 TABLET ORAL at 22:59

## 2025-08-01 RX ADMIN — DEXTROSE AND SODIUM CHLORIDE: 5; .45 INJECTION, SOLUTION INTRAVENOUS at 01:58

## 2025-08-01 RX ADMIN — POTASSIUM CHLORIDE 10 MEQ: 7.46 INJECTION, SOLUTION INTRAVENOUS at 16:27

## 2025-08-01 RX ADMIN — Medication 10 ML: at 08:09

## 2025-08-01 RX ADMIN — SODIUM PHOSPHATE, MONOBASIC, MONOHYDRATE AND SODIUM PHOSPHATE, DIBASIC, ANHYDROUS 15 MMOL: 142; 276 INJECTION, SOLUTION INTRAVENOUS at 00:47

## 2025-08-01 RX ADMIN — Medication 500 MG: at 11:30

## 2025-08-01 RX ADMIN — Medication 500 MG: at 21:14

## 2025-08-01 RX ADMIN — PHENOBARBITAL 32.4 MG: 32.4 TABLET ORAL at 08:08

## 2025-08-01 RX ADMIN — Medication 10 ML: at 21:16

## 2025-08-01 RX ADMIN — ACETAMINOPHEN 650 MG: 325 TABLET ORAL at 21:15

## 2025-08-01 RX ADMIN — MAGNESIUM SULFATE HEPTAHYDRATE 4000 MG: 40 INJECTION, SOLUTION INTRAVENOUS at 13:26

## 2025-08-01 RX ADMIN — DEXTROSE AND SODIUM CHLORIDE: 5; .45 INJECTION, SOLUTION INTRAVENOUS at 08:58

## 2025-08-01 RX ADMIN — POTASSIUM CHLORIDE 10 MEQ: 7.46 INJECTION, SOLUTION INTRAVENOUS at 07:05

## 2025-08-01 RX ADMIN — KETOROLAC TROMETHAMINE 15 MG: 15 INJECTION, SOLUTION INTRAMUSCULAR; INTRAVENOUS at 14:24

## 2025-08-01 RX ADMIN — POTASSIUM CHLORIDE 10 MEQ: 7.46 INJECTION, SOLUTION INTRAVENOUS at 02:51

## 2025-08-01 RX ADMIN — POTASSIUM CHLORIDE 10 MEQ: 7.46 INJECTION, SOLUTION INTRAVENOUS at 14:25

## 2025-08-01 RX ADMIN — POTASSIUM CHLORIDE 10 MEQ: 7.46 INJECTION, SOLUTION INTRAVENOUS at 06:06

## 2025-08-01 RX ADMIN — DIAZEPAM 2 MG: 2 TABLET ORAL at 11:30

## 2025-08-01 ASSESSMENT — PAIN DESCRIPTION - LOCATION
LOCATION: ABDOMEN
LOCATION: HEAD
LOCATION: HEAD

## 2025-08-01 ASSESSMENT — PAIN DESCRIPTION - DESCRIPTORS
DESCRIPTORS: ACHING
DESCRIPTORS: ACHING;DISCOMFORT;SORE
DESCRIPTORS: ACHING

## 2025-08-01 ASSESSMENT — PAIN SCALES - GENERAL
PAINLEVEL_OUTOF10: 5
PAINLEVEL_OUTOF10: 0
PAINLEVEL_OUTOF10: 0
PAINLEVEL_OUTOF10: 9
PAINLEVEL_OUTOF10: 5
PAINLEVEL_OUTOF10: 0

## 2025-08-01 ASSESSMENT — PAIN - FUNCTIONAL ASSESSMENT: PAIN_FUNCTIONAL_ASSESSMENT: ACTIVITIES ARE NOT PREVENTED

## 2025-08-01 ASSESSMENT — PAIN DESCRIPTION - ORIENTATION: ORIENTATION: RIGHT;LEFT;LOWER

## 2025-08-02 LAB
ALBUMIN SERPL-MCNC: 2.9 G/DL (ref 3.5–5.2)
ALP SERPL-CCNC: 127 U/L (ref 40–129)
ALT SERPL-CCNC: 37 U/L (ref 0–50)
ANION GAP SERPL CALCULATED.3IONS-SCNC: 9 MMOL/L (ref 7–16)
AST SERPL-CCNC: 123 U/L (ref 0–50)
B-OH-BUTYR SERPL-MCNC: 0.22 MMOL/L (ref 0.02–0.27)
BASOPHILS # BLD: 0.04 K/UL (ref 0–0.2)
BASOPHILS NFR BLD: 1 % (ref 0–2)
BILIRUB SERPL-MCNC: 1.3 MG/DL (ref 0–1.2)
BUN SERPL-MCNC: 5 MG/DL (ref 6–20)
CALCIUM SERPL-MCNC: 7.6 MG/DL (ref 8.6–10)
CHLORIDE SERPL-SCNC: 100 MMOL/L (ref 98–107)
CO2 SERPL-SCNC: 20 MMOL/L (ref 22–29)
CREAT SERPL-MCNC: 0.6 MG/DL (ref 0.7–1.2)
EOSINOPHIL # BLD: 0.15 K/UL (ref 0.05–0.5)
EOSINOPHILS RELATIVE PERCENT: 4 % (ref 0–6)
ERYTHROCYTE [DISTWIDTH] IN BLOOD BY AUTOMATED COUNT: 13.2 % (ref 11.5–15)
GFR, ESTIMATED: >90 ML/MIN/1.73M2
GLUCOSE BLD-MCNC: 163 MG/DL (ref 74–99)
GLUCOSE BLD-MCNC: 177 MG/DL (ref 74–99)
GLUCOSE BLD-MCNC: 193 MG/DL (ref 74–99)
GLUCOSE BLD-MCNC: 215 MG/DL (ref 74–99)
GLUCOSE SERPL-MCNC: 169 MG/DL (ref 74–99)
HCT VFR BLD AUTO: 23.6 % (ref 37–54)
HGB BLD-MCNC: 8 G/DL (ref 12.5–16.5)
IMM GRANULOCYTES # BLD AUTO: 0.11 K/UL (ref 0–0.58)
IMM GRANULOCYTES NFR BLD: 3 % (ref 0–5)
LYMPHOCYTES NFR BLD: 0.73 K/UL (ref 1.5–4)
LYMPHOCYTES RELATIVE PERCENT: 17 % (ref 20–42)
MAGNESIUM SERPL-MCNC: 2 MG/DL (ref 1.6–2.6)
MCH RBC QN AUTO: 33.8 PG (ref 26–35)
MCHC RBC AUTO-ENTMCNC: 33.9 G/DL (ref 32–34.5)
MCV RBC AUTO: 99.6 FL (ref 80–99.9)
MONOCYTES NFR BLD: 0.52 K/UL (ref 0.1–0.95)
MONOCYTES NFR BLD: 12 % (ref 2–12)
NEUTROPHILS NFR BLD: 64 % (ref 43–80)
NEUTS SEG NFR BLD: 2.79 K/UL (ref 1.8–7.3)
PHOSPHATE SERPL-MCNC: 2.8 MG/DL (ref 2.5–4.5)
PLATELET, FLUORESCENCE: 124 K/UL (ref 130–450)
PMV BLD AUTO: 10.5 FL (ref 7–12)
POTASSIUM SERPL-SCNC: 3.9 MMOL/L (ref 3.5–5.1)
PROT SERPL-MCNC: 5.4 G/DL (ref 6.4–8.3)
RBC # BLD AUTO: 2.37 M/UL (ref 3.8–5.8)
SODIUM SERPL-SCNC: 130 MMOL/L (ref 136–145)
WBC OTHER # BLD: 4.3 K/UL (ref 4.5–11.5)

## 2025-08-02 PROCEDURE — 82010 KETONE BODYS QUAN: CPT

## 2025-08-02 PROCEDURE — 6370000000 HC RX 637 (ALT 250 FOR IP): Performed by: INTERNAL MEDICINE

## 2025-08-02 PROCEDURE — 2000000000 HC ICU R&B

## 2025-08-02 PROCEDURE — 6370000000 HC RX 637 (ALT 250 FOR IP): Performed by: FAMILY MEDICINE

## 2025-08-02 PROCEDURE — 83735 ASSAY OF MAGNESIUM: CPT

## 2025-08-02 PROCEDURE — 85025 COMPLETE CBC W/AUTO DIFF WBC: CPT

## 2025-08-02 PROCEDURE — 82962 GLUCOSE BLOOD TEST: CPT

## 2025-08-02 PROCEDURE — 84100 ASSAY OF PHOSPHORUS: CPT

## 2025-08-02 PROCEDURE — 6370000000 HC RX 637 (ALT 250 FOR IP)

## 2025-08-02 PROCEDURE — 80053 COMPREHEN METABOLIC PANEL: CPT

## 2025-08-02 PROCEDURE — 99232 SBSQ HOSP IP/OBS MODERATE 35: CPT | Performed by: INTERNAL MEDICINE

## 2025-08-02 PROCEDURE — 2500000003 HC RX 250 WO HCPCS: Performed by: FAMILY MEDICINE

## 2025-08-02 PROCEDURE — 6370000000 HC RX 637 (ALT 250 FOR IP): Performed by: PHYSICIAN ASSISTANT

## 2025-08-02 PROCEDURE — 2580000003 HC RX 258: Performed by: INTERNAL MEDICINE

## 2025-08-02 PROCEDURE — 6360000002 HC RX W HCPCS

## 2025-08-02 PROCEDURE — 2500000003 HC RX 250 WO HCPCS

## 2025-08-02 PROCEDURE — 99291 CRITICAL CARE FIRST HOUR: CPT | Performed by: INTERNAL MEDICINE

## 2025-08-02 PROCEDURE — 6360000002 HC RX W HCPCS: Performed by: FAMILY MEDICINE

## 2025-08-02 RX ORDER — INSULIN GLARGINE 100 [IU]/ML
6 INJECTION, SOLUTION SUBCUTANEOUS DAILY
Status: DISCONTINUED | OUTPATIENT
Start: 2025-08-02 | End: 2025-08-09 | Stop reason: HOSPADM

## 2025-08-02 RX ORDER — PANTOPRAZOLE SODIUM 40 MG/1
40 TABLET, DELAYED RELEASE ORAL
Status: DISCONTINUED | OUTPATIENT
Start: 2025-08-03 | End: 2025-08-09 | Stop reason: HOSPADM

## 2025-08-02 RX ORDER — DOCUSATE SODIUM 100 MG/1
100 CAPSULE, LIQUID FILLED ORAL 2 TIMES DAILY
Status: DISCONTINUED | OUTPATIENT
Start: 2025-08-02 | End: 2025-08-09 | Stop reason: HOSPADM

## 2025-08-02 RX ADMIN — GABAPENTIN 400 MG: 400 CAPSULE ORAL at 07:59

## 2025-08-02 RX ADMIN — PHENOBARBITAL 16.2 MG: 32.4 TABLET ORAL at 07:58

## 2025-08-02 RX ADMIN — Medication 500 MG: at 20:07

## 2025-08-02 RX ADMIN — DIAZEPAM 5 MG: 5 TABLET ORAL at 12:18

## 2025-08-02 RX ADMIN — PANTOPRAZOLE SODIUM 40 MG: 40 INJECTION, POWDER, FOR SOLUTION INTRAVENOUS at 07:59

## 2025-08-02 RX ADMIN — GABAPENTIN 400 MG: 400 CAPSULE ORAL at 13:30

## 2025-08-02 RX ADMIN — INSULIN GLARGINE 6 UNITS: 100 INJECTION, SOLUTION SUBCUTANEOUS at 12:19

## 2025-08-02 RX ADMIN — INSULIN LISPRO 2 UNITS: 100 INJECTION, SOLUTION INTRAVENOUS; SUBCUTANEOUS at 16:48

## 2025-08-02 RX ADMIN — ACETAMINOPHEN 650 MG: 325 TABLET ORAL at 07:54

## 2025-08-02 RX ADMIN — FOLIC ACID 1 MG: 1 TABLET ORAL at 07:58

## 2025-08-02 RX ADMIN — DOCUSATE SODIUM 100 MG: 100 CAPSULE, LIQUID FILLED ORAL at 23:06

## 2025-08-02 RX ADMIN — Medication 500 MG: at 12:17

## 2025-08-02 RX ADMIN — GABAPENTIN 400 MG: 400 CAPSULE ORAL at 20:07

## 2025-08-02 RX ADMIN — Medication 10 ML: at 08:03

## 2025-08-02 RX ADMIN — SODIUM CHLORIDE: 0.9 INJECTION, SOLUTION INTRAVENOUS at 18:29

## 2025-08-02 RX ADMIN — ACETAMINOPHEN 650 MG: 325 TABLET ORAL at 16:47

## 2025-08-02 RX ADMIN — Medication 500 MG: at 05:51

## 2025-08-02 RX ADMIN — ENOXAPARIN SODIUM 40 MG: 100 INJECTION SUBCUTANEOUS at 07:57

## 2025-08-02 RX ADMIN — INSULIN LISPRO 2 UNITS: 100 INJECTION, SOLUTION INTRAVENOUS; SUBCUTANEOUS at 08:02

## 2025-08-02 RX ADMIN — DIAZEPAM 5 MG: 5 TABLET ORAL at 05:51

## 2025-08-02 RX ADMIN — Medication 10 ML: at 20:07

## 2025-08-02 RX ADMIN — POLYETHYLENE GLYCOL 3350 17 G: 17 POWDER, FOR SOLUTION ORAL at 07:55

## 2025-08-02 RX ADMIN — DIAZEPAM 5 MG: 5 TABLET ORAL at 16:44

## 2025-08-02 RX ADMIN — Medication 1 TABLET: at 07:58

## 2025-08-02 RX ADMIN — SODIUM CHLORIDE: 0.9 INJECTION, SOLUTION INTRAVENOUS at 09:08

## 2025-08-02 RX ADMIN — DIAZEPAM 5 MG: 5 TABLET ORAL at 23:06

## 2025-08-02 ASSESSMENT — PAIN DESCRIPTION - DESCRIPTORS
DESCRIPTORS: ACHING
DESCRIPTORS: ACHING

## 2025-08-02 ASSESSMENT — PAIN DESCRIPTION - LOCATION
LOCATION: HEAD
LOCATION: HEAD

## 2025-08-02 ASSESSMENT — PAIN DESCRIPTION - ORIENTATION
ORIENTATION: MID
ORIENTATION: MID;POSTERIOR

## 2025-08-02 ASSESSMENT — PAIN SCALES - GENERAL
PAINLEVEL_OUTOF10: 5
PAINLEVEL_OUTOF10: 0
PAINLEVEL_OUTOF10: 0
PAINLEVEL_OUTOF10: 5

## 2025-08-03 LAB
ALBUMIN SERPL-MCNC: 3 G/DL (ref 3.5–5.2)
ALP SERPL-CCNC: 131 U/L (ref 40–129)
ALT SERPL-CCNC: 43 U/L (ref 0–50)
ANION GAP SERPL CALCULATED.3IONS-SCNC: 9 MMOL/L (ref 7–16)
AST SERPL-CCNC: 122 U/L (ref 0–50)
B-OH-BUTYR SERPL-MCNC: 0.18 MMOL/L (ref 0.02–0.27)
BASOPHILS # BLD: 0.08 K/UL (ref 0–0.2)
BASOPHILS NFR BLD: 2 % (ref 0–2)
BILIRUB SERPL-MCNC: 1.2 MG/DL (ref 0–1.2)
BUN SERPL-MCNC: 5 MG/DL (ref 6–20)
CALCIUM SERPL-MCNC: 7.8 MG/DL (ref 8.6–10)
CHLORIDE SERPL-SCNC: 104 MMOL/L (ref 98–107)
CO2 SERPL-SCNC: 23 MMOL/L (ref 22–29)
CREAT SERPL-MCNC: 0.6 MG/DL (ref 0.7–1.2)
EOSINOPHIL # BLD: 0.11 K/UL (ref 0.05–0.5)
EOSINOPHILS RELATIVE PERCENT: 2 % (ref 0–6)
ERYTHROCYTE [DISTWIDTH] IN BLOOD BY AUTOMATED COUNT: 13.8 % (ref 11.5–15)
GFR, ESTIMATED: >90 ML/MIN/1.73M2
GLUCOSE BLD-MCNC: 142 MG/DL (ref 74–99)
GLUCOSE BLD-MCNC: 143 MG/DL (ref 74–99)
GLUCOSE BLD-MCNC: 146 MG/DL (ref 74–99)
GLUCOSE BLD-MCNC: 185 MG/DL (ref 74–99)
GLUCOSE BLD-MCNC: 239 MG/DL (ref 74–99)
GLUCOSE SERPL-MCNC: 150 MG/DL (ref 74–99)
HCT VFR BLD AUTO: 24.7 % (ref 37–54)
HGB BLD-MCNC: 8.3 G/DL (ref 12.5–16.5)
IMM GRANULOCYTES # BLD AUTO: 0.27 K/UL (ref 0–0.58)
IMM GRANULOCYTES NFR BLD: 5 % (ref 0–5)
LYMPHOCYTES NFR BLD: 0.96 K/UL (ref 1.5–4)
LYMPHOCYTES RELATIVE PERCENT: 17 % (ref 20–42)
MAGNESIUM SERPL-MCNC: 1.6 MG/DL (ref 1.6–2.6)
MCH RBC QN AUTO: 34.4 PG (ref 26–35)
MCHC RBC AUTO-ENTMCNC: 33.6 G/DL (ref 32–34.5)
MCV RBC AUTO: 102.5 FL (ref 80–99.9)
MONOCYTES NFR BLD: 0.78 K/UL (ref 0.1–0.95)
MONOCYTES NFR BLD: 14 % (ref 2–12)
NEUTROPHILS NFR BLD: 60 % (ref 43–80)
NEUTS SEG NFR BLD: 3.31 K/UL (ref 1.8–7.3)
PHOSPHATE SERPL-MCNC: 2.7 MG/DL (ref 2.5–4.5)
PLATELET # BLD AUTO: 164 K/UL (ref 130–450)
PMV BLD AUTO: 10.3 FL (ref 7–12)
POTASSIUM SERPL-SCNC: 4.2 MMOL/L (ref 3.5–5.1)
PROT SERPL-MCNC: 5.6 G/DL (ref 6.4–8.3)
RBC # BLD AUTO: 2.41 M/UL (ref 3.8–5.8)
SODIUM SERPL-SCNC: 136 MMOL/L (ref 136–145)
WBC OTHER # BLD: 5.5 K/UL (ref 4.5–11.5)

## 2025-08-03 PROCEDURE — 82962 GLUCOSE BLOOD TEST: CPT

## 2025-08-03 PROCEDURE — 6370000000 HC RX 637 (ALT 250 FOR IP): Performed by: PHYSICIAN ASSISTANT

## 2025-08-03 PROCEDURE — 85025 COMPLETE CBC W/AUTO DIFF WBC: CPT

## 2025-08-03 PROCEDURE — 99233 SBSQ HOSP IP/OBS HIGH 50: CPT | Performed by: INTERNAL MEDICINE

## 2025-08-03 PROCEDURE — 6360000002 HC RX W HCPCS

## 2025-08-03 PROCEDURE — 6370000000 HC RX 637 (ALT 250 FOR IP): Performed by: INTERNAL MEDICINE

## 2025-08-03 PROCEDURE — 97161 PT EVAL LOW COMPLEX 20 MIN: CPT | Performed by: PHYSICAL THERAPIST

## 2025-08-03 PROCEDURE — 1200000000 HC SEMI PRIVATE

## 2025-08-03 PROCEDURE — 83735 ASSAY OF MAGNESIUM: CPT

## 2025-08-03 PROCEDURE — 6370000000 HC RX 637 (ALT 250 FOR IP)

## 2025-08-03 PROCEDURE — 97110 THERAPEUTIC EXERCISES: CPT | Performed by: PHYSICAL THERAPIST

## 2025-08-03 PROCEDURE — 2580000003 HC RX 258: Performed by: INTERNAL MEDICINE

## 2025-08-03 PROCEDURE — 82010 KETONE BODYS QUAN: CPT

## 2025-08-03 PROCEDURE — 84100 ASSAY OF PHOSPHORUS: CPT

## 2025-08-03 PROCEDURE — 6370000000 HC RX 637 (ALT 250 FOR IP): Performed by: FAMILY MEDICINE

## 2025-08-03 PROCEDURE — 2500000003 HC RX 250 WO HCPCS: Performed by: FAMILY MEDICINE

## 2025-08-03 PROCEDURE — 80053 COMPREHEN METABOLIC PANEL: CPT

## 2025-08-03 PROCEDURE — 99232 SBSQ HOSP IP/OBS MODERATE 35: CPT | Performed by: INTERNAL MEDICINE

## 2025-08-03 PROCEDURE — 6360000002 HC RX W HCPCS: Performed by: FAMILY MEDICINE

## 2025-08-03 RX ORDER — MAGNESIUM SULFATE IN WATER 40 MG/ML
2000 INJECTION, SOLUTION INTRAVENOUS ONCE
Status: COMPLETED | OUTPATIENT
Start: 2025-08-03 | End: 2025-08-03

## 2025-08-03 RX ADMIN — INSULIN LISPRO 2 UNITS: 100 INJECTION, SOLUTION INTRAVENOUS; SUBCUTANEOUS at 11:56

## 2025-08-03 RX ADMIN — Medication 500 MG: at 21:10

## 2025-08-03 RX ADMIN — DIAZEPAM 5 MG: 5 TABLET ORAL at 23:01

## 2025-08-03 RX ADMIN — Medication 1 TABLET: at 07:46

## 2025-08-03 RX ADMIN — Medication 500 MG: at 11:51

## 2025-08-03 RX ADMIN — Medication 500 MG: at 06:09

## 2025-08-03 RX ADMIN — DOCUSATE SODIUM 100 MG: 100 CAPSULE, LIQUID FILLED ORAL at 07:47

## 2025-08-03 RX ADMIN — GABAPENTIN 400 MG: 400 CAPSULE ORAL at 14:45

## 2025-08-03 RX ADMIN — DOCUSATE SODIUM 100 MG: 100 CAPSULE, LIQUID FILLED ORAL at 21:10

## 2025-08-03 RX ADMIN — ACETAMINOPHEN 650 MG: 325 TABLET ORAL at 07:46

## 2025-08-03 RX ADMIN — SODIUM CHLORIDE: 0.9 INJECTION, SOLUTION INTRAVENOUS at 04:35

## 2025-08-03 RX ADMIN — FOLIC ACID 1 MG: 1 TABLET ORAL at 07:47

## 2025-08-03 RX ADMIN — INSULIN LISPRO 2 UNITS: 100 INJECTION, SOLUTION INTRAVENOUS; SUBCUTANEOUS at 16:52

## 2025-08-03 RX ADMIN — GABAPENTIN 400 MG: 400 CAPSULE ORAL at 07:46

## 2025-08-03 RX ADMIN — INSULIN GLARGINE 6 UNITS: 100 INJECTION, SOLUTION SUBCUTANEOUS at 07:57

## 2025-08-03 RX ADMIN — GABAPENTIN 400 MG: 400 CAPSULE ORAL at 21:10

## 2025-08-03 RX ADMIN — Medication 10 ML: at 07:58

## 2025-08-03 RX ADMIN — ACETAMINOPHEN 650 MG: 325 TABLET ORAL at 14:49

## 2025-08-03 RX ADMIN — ENOXAPARIN SODIUM 40 MG: 100 INJECTION SUBCUTANEOUS at 07:47

## 2025-08-03 RX ADMIN — SODIUM CHLORIDE: 0.9 INJECTION, SOLUTION INTRAVENOUS at 23:03

## 2025-08-03 RX ADMIN — SODIUM CHLORIDE: 0.9 INJECTION, SOLUTION INTRAVENOUS at 14:45

## 2025-08-03 RX ADMIN — MAGNESIUM SULFATE HEPTAHYDRATE 2000 MG: 40 INJECTION, SOLUTION INTRAVENOUS at 07:56

## 2025-08-03 RX ADMIN — ACETAMINOPHEN 650 MG: 325 TABLET ORAL at 23:01

## 2025-08-03 RX ADMIN — Medication 10 ML: at 21:10

## 2025-08-03 RX ADMIN — POLYETHYLENE GLYCOL 3350 17 G: 17 POWDER, FOR SOLUTION ORAL at 07:47

## 2025-08-03 RX ADMIN — DIAZEPAM 5 MG: 5 TABLET ORAL at 16:48

## 2025-08-03 RX ADMIN — DIAZEPAM 5 MG: 5 TABLET ORAL at 06:09

## 2025-08-03 RX ADMIN — PANTOPRAZOLE SODIUM 40 MG: 40 TABLET, DELAYED RELEASE ORAL at 06:09

## 2025-08-03 RX ADMIN — DIAZEPAM 5 MG: 5 TABLET ORAL at 11:51

## 2025-08-03 ASSESSMENT — PAIN DESCRIPTION - DESCRIPTORS
DESCRIPTORS: ACHING

## 2025-08-03 ASSESSMENT — PAIN DESCRIPTION - LOCATION
LOCATION: HEAD

## 2025-08-03 ASSESSMENT — PAIN DESCRIPTION - ORIENTATION
ORIENTATION: MID
ORIENTATION: MID

## 2025-08-03 ASSESSMENT — PAIN SCALES - GENERAL
PAINLEVEL_OUTOF10: 5

## 2025-08-04 LAB
ALBUMIN SERPL-MCNC: 3.1 G/DL (ref 3.5–5.2)
ALP SERPL-CCNC: 144 U/L (ref 40–129)
ALT SERPL-CCNC: 42 U/L (ref 0–50)
ANION GAP SERPL CALCULATED.3IONS-SCNC: 10 MMOL/L (ref 7–16)
AST SERPL-CCNC: 95 U/L (ref 0–50)
BASOPHILS # BLD: 0.06 K/UL (ref 0–0.2)
BASOPHILS NFR BLD: 1 % (ref 0–2)
BILIRUB SERPL-MCNC: 1.1 MG/DL (ref 0–1.2)
BUN SERPL-MCNC: 6 MG/DL (ref 6–20)
CALCIUM SERPL-MCNC: 8.7 MG/DL (ref 8.6–10)
CHLORIDE SERPL-SCNC: 106 MMOL/L (ref 98–107)
CO2 SERPL-SCNC: 22 MMOL/L (ref 22–29)
CREAT SERPL-MCNC: 0.6 MG/DL (ref 0.7–1.2)
EOSINOPHIL # BLD: 0.06 K/UL (ref 0.05–0.5)
EOSINOPHILS RELATIVE PERCENT: 1 % (ref 0–6)
ERYTHROCYTE [DISTWIDTH] IN BLOOD BY AUTOMATED COUNT: 14.8 % (ref 11.5–15)
GFR, ESTIMATED: >90 ML/MIN/1.73M2
GLUCOSE BLD-MCNC: 121 MG/DL (ref 74–99)
GLUCOSE BLD-MCNC: 152 MG/DL (ref 74–99)
GLUCOSE BLD-MCNC: 168 MG/DL (ref 74–99)
GLUCOSE BLD-MCNC: 176 MG/DL (ref 74–99)
GLUCOSE SERPL-MCNC: 121 MG/DL (ref 74–99)
HCT VFR BLD AUTO: 33.4 % (ref 37–54)
HGB BLD-MCNC: 10.5 G/DL (ref 12.5–16.5)
LYMPHOCYTES NFR BLD: 1.01 K/UL (ref 1.5–4)
LYMPHOCYTES RELATIVE PERCENT: 16 % (ref 20–42)
MAGNESIUM SERPL-MCNC: 1.5 MG/DL (ref 1.6–2.6)
MCH RBC QN AUTO: 33.5 PG (ref 26–35)
MCHC RBC AUTO-ENTMCNC: 31.4 G/DL (ref 32–34.5)
MCV RBC AUTO: 106.7 FL (ref 80–99.9)
METAMYELOCYTES ABSOLUTE COUNT: 0.06 K/UL (ref 0–0.12)
METAMYELOCYTES: 1 % (ref 0–1)
MONOCYTES NFR BLD: 0.56 K/UL (ref 0.1–0.95)
MONOCYTES NFR BLD: 9 % (ref 2–12)
MYELOCYTES ABSOLUTE COUNT: 0.11 K/UL
MYELOCYTES: 2 %
NEUTROPHILS NFR BLD: 71 % (ref 43–80)
NEUTS SEG NFR BLD: 4.55 K/UL (ref 1.8–7.3)
PHOSPHATE SERPL-MCNC: 3.8 MG/DL (ref 2.5–4.5)
PLATELET # BLD AUTO: 252 K/UL (ref 130–450)
PMV BLD AUTO: 10.1 FL (ref 7–12)
POTASSIUM SERPL-SCNC: 4.7 MMOL/L (ref 3.5–5.1)
PROT SERPL-MCNC: 5.9 G/DL (ref 6.4–8.3)
RBC # BLD AUTO: 3.13 M/UL (ref 3.8–5.8)
RBC # BLD: ABNORMAL 10*6/UL
SODIUM SERPL-SCNC: 138 MMOL/L (ref 136–145)
WBC OTHER # BLD: 6.4 K/UL (ref 4.5–11.5)

## 2025-08-04 PROCEDURE — 84100 ASSAY OF PHOSPHORUS: CPT

## 2025-08-04 PROCEDURE — 6360000002 HC RX W HCPCS: Performed by: FAMILY MEDICINE

## 2025-08-04 PROCEDURE — 85025 COMPLETE CBC W/AUTO DIFF WBC: CPT

## 2025-08-04 PROCEDURE — 36415 COLL VENOUS BLD VENIPUNCTURE: CPT

## 2025-08-04 PROCEDURE — 83735 ASSAY OF MAGNESIUM: CPT

## 2025-08-04 PROCEDURE — 2500000003 HC RX 250 WO HCPCS: Performed by: FAMILY MEDICINE

## 2025-08-04 PROCEDURE — 6370000000 HC RX 637 (ALT 250 FOR IP): Performed by: FAMILY MEDICINE

## 2025-08-04 PROCEDURE — 82962 GLUCOSE BLOOD TEST: CPT

## 2025-08-04 PROCEDURE — 1200000000 HC SEMI PRIVATE

## 2025-08-04 PROCEDURE — 97165 OT EVAL LOW COMPLEX 30 MIN: CPT

## 2025-08-04 PROCEDURE — 6370000000 HC RX 637 (ALT 250 FOR IP)

## 2025-08-04 PROCEDURE — 80053 COMPREHEN METABOLIC PANEL: CPT

## 2025-08-04 PROCEDURE — 6370000000 HC RX 637 (ALT 250 FOR IP): Performed by: INTERNAL MEDICINE

## 2025-08-04 PROCEDURE — 99232 SBSQ HOSP IP/OBS MODERATE 35: CPT | Performed by: FAMILY MEDICINE

## 2025-08-04 PROCEDURE — 97530 THERAPEUTIC ACTIVITIES: CPT

## 2025-08-04 PROCEDURE — 6370000000 HC RX 637 (ALT 250 FOR IP): Performed by: PHYSICIAN ASSISTANT

## 2025-08-04 RX ORDER — MAGNESIUM OXIDE 400 MG/1
400 TABLET ORAL 2 TIMES DAILY
Qty: 60 TABLET | Refills: 1 | Status: SHIPPED | OUTPATIENT
Start: 2025-08-04

## 2025-08-04 RX ORDER — TRAMADOL HYDROCHLORIDE 50 MG/1
50 TABLET ORAL EVERY 6 HOURS PRN
Status: DISCONTINUED | OUTPATIENT
Start: 2025-08-04 | End: 2025-08-09 | Stop reason: HOSPADM

## 2025-08-04 RX ORDER — KETOROLAC TROMETHAMINE 10 MG/1
10 TABLET, FILM COATED ORAL EVERY 6 HOURS PRN
Qty: 20 TABLET | Refills: 0 | Status: SHIPPED | OUTPATIENT
Start: 2025-08-04 | End: 2025-08-09 | Stop reason: HOSPADM

## 2025-08-04 RX ORDER — MAGNESIUM SULFATE IN WATER 40 MG/ML
2000 INJECTION, SOLUTION INTRAVENOUS ONCE
Status: COMPLETED | OUTPATIENT
Start: 2025-08-04 | End: 2025-08-04

## 2025-08-04 RX ORDER — GABAPENTIN 400 MG/1
400 CAPSULE ORAL 3 TIMES DAILY
Qty: 30 CAPSULE | Refills: 0 | Status: SHIPPED | OUTPATIENT
Start: 2025-08-04 | End: 2025-08-09

## 2025-08-04 RX ORDER — TRAMADOL HYDROCHLORIDE 50 MG/1
50 TABLET ORAL EVERY 6 HOURS PRN
Qty: 28 TABLET | Refills: 0 | Status: SHIPPED | OUTPATIENT
Start: 2025-08-04 | End: 2025-08-09

## 2025-08-04 RX ORDER — DIAZEPAM 5 MG/1
5 TABLET ORAL EVERY 6 HOURS PRN
Qty: 40 TABLET | Refills: 0 | Status: SHIPPED | OUTPATIENT
Start: 2025-08-04 | End: 2025-08-09

## 2025-08-04 RX ADMIN — DIAZEPAM 5 MG: 5 TABLET ORAL at 17:27

## 2025-08-04 RX ADMIN — Medication 10 ML: at 19:46

## 2025-08-04 RX ADMIN — FOLIC ACID 1 MG: 1 TABLET ORAL at 08:22

## 2025-08-04 RX ADMIN — DIAZEPAM 5 MG: 5 TABLET ORAL at 13:08

## 2025-08-04 RX ADMIN — POLYETHYLENE GLYCOL 3350 17 G: 17 POWDER, FOR SOLUTION ORAL at 17:37

## 2025-08-04 RX ADMIN — MAGNESIUM SULFATE HEPTAHYDRATE 2000 MG: 40 INJECTION, SOLUTION INTRAVENOUS at 08:32

## 2025-08-04 RX ADMIN — INSULIN GLARGINE 6 UNITS: 100 INJECTION, SOLUTION SUBCUTANEOUS at 08:22

## 2025-08-04 RX ADMIN — Medication 500 MG: at 13:08

## 2025-08-04 RX ADMIN — GABAPENTIN 400 MG: 400 CAPSULE ORAL at 08:22

## 2025-08-04 RX ADMIN — DIAZEPAM 5 MG: 5 TABLET ORAL at 05:09

## 2025-08-04 RX ADMIN — Medication 500 MG: at 19:46

## 2025-08-04 RX ADMIN — GABAPENTIN 400 MG: 400 CAPSULE ORAL at 19:46

## 2025-08-04 RX ADMIN — Medication 1 TABLET: at 08:23

## 2025-08-04 RX ADMIN — DOCUSATE SODIUM 100 MG: 100 CAPSULE, LIQUID FILLED ORAL at 08:22

## 2025-08-04 RX ADMIN — Medication 10 ML: at 08:23

## 2025-08-04 RX ADMIN — GABAPENTIN 400 MG: 400 CAPSULE ORAL at 13:08

## 2025-08-04 RX ADMIN — PANTOPRAZOLE SODIUM 40 MG: 40 TABLET, DELAYED RELEASE ORAL at 05:08

## 2025-08-04 RX ADMIN — ENOXAPARIN SODIUM 40 MG: 100 INJECTION SUBCUTANEOUS at 08:23

## 2025-08-04 RX ADMIN — Medication 500 MG: at 05:08

## 2025-08-04 RX ADMIN — TRAMADOL HYDROCHLORIDE 50 MG: 50 TABLET, COATED ORAL at 17:27

## 2025-08-04 RX ADMIN — DOCUSATE SODIUM 100 MG: 100 CAPSULE, LIQUID FILLED ORAL at 19:46

## 2025-08-05 LAB
ALBUMIN SERPL-MCNC: 3.2 G/DL (ref 3.5–5.2)
ALP SERPL-CCNC: 176 U/L (ref 40–129)
ALT SERPL-CCNC: 41 U/L (ref 0–50)
ANION GAP SERPL CALCULATED.3IONS-SCNC: 11 MMOL/L (ref 7–16)
AST SERPL-CCNC: 87 U/L (ref 0–50)
BASOPHILS # BLD: 0.06 K/UL (ref 0–0.2)
BASOPHILS NFR BLD: 1 % (ref 0–2)
BILIRUB SERPL-MCNC: 1 MG/DL (ref 0–1.2)
BUN SERPL-MCNC: 7 MG/DL (ref 6–20)
CALCIUM SERPL-MCNC: 9.3 MG/DL (ref 8.6–10)
CHLORIDE SERPL-SCNC: 100 MMOL/L (ref 98–107)
CO2 SERPL-SCNC: 24 MMOL/L (ref 22–29)
CREAT SERPL-MCNC: 0.8 MG/DL (ref 0.7–1.2)
EOSINOPHIL # BLD: 0.1 K/UL (ref 0.05–0.5)
EOSINOPHILS RELATIVE PERCENT: 2 % (ref 0–6)
ERYTHROCYTE [DISTWIDTH] IN BLOOD BY AUTOMATED COUNT: 15.2 % (ref 11.5–15)
GFR, ESTIMATED: >90 ML/MIN/1.73M2
GLUCOSE BLD-MCNC: 137 MG/DL (ref 74–99)
GLUCOSE BLD-MCNC: 160 MG/DL (ref 74–99)
GLUCOSE BLD-MCNC: 168 MG/DL (ref 74–99)
GLUCOSE BLD-MCNC: 193 MG/DL (ref 74–99)
GLUCOSE SERPL-MCNC: 132 MG/DL (ref 74–99)
HCT VFR BLD AUTO: 29.5 % (ref 37–54)
HGB BLD-MCNC: 9.7 G/DL (ref 12.5–16.5)
IMM GRANULOCYTES # BLD AUTO: 0.21 K/UL (ref 0–0.58)
IMM GRANULOCYTES NFR BLD: 3 % (ref 0–5)
LYMPHOCYTES NFR BLD: 1.32 K/UL (ref 1.5–4)
LYMPHOCYTES RELATIVE PERCENT: 20 % (ref 20–42)
MAGNESIUM SERPL-MCNC: 1.4 MG/DL (ref 1.6–2.6)
MCH RBC QN AUTO: 34.9 PG (ref 26–35)
MCHC RBC AUTO-ENTMCNC: 32.9 G/DL (ref 32–34.5)
MCV RBC AUTO: 106.1 FL (ref 80–99.9)
MONOCYTES NFR BLD: 1.34 K/UL (ref 0.1–0.95)
MONOCYTES NFR BLD: 20 % (ref 2–12)
NEUTROPHILS NFR BLD: 54 % (ref 43–80)
NEUTS SEG NFR BLD: 3.56 K/UL (ref 1.8–7.3)
PHOSPHATE SERPL-MCNC: 4.3 MG/DL (ref 2.5–4.5)
PLATELET # BLD AUTO: 284 K/UL (ref 130–450)
PMV BLD AUTO: 10 FL (ref 7–12)
POTASSIUM SERPL-SCNC: 4.4 MMOL/L (ref 3.5–5.1)
PROT SERPL-MCNC: 6.2 G/DL (ref 6.4–8.3)
RBC # BLD AUTO: 2.78 M/UL (ref 3.8–5.8)
SODIUM SERPL-SCNC: 134 MMOL/L (ref 136–145)
WBC OTHER # BLD: 6.6 K/UL (ref 4.5–11.5)

## 2025-08-05 PROCEDURE — 82962 GLUCOSE BLOOD TEST: CPT

## 2025-08-05 PROCEDURE — 1200000000 HC SEMI PRIVATE

## 2025-08-05 PROCEDURE — 6370000000 HC RX 637 (ALT 250 FOR IP): Performed by: PHYSICIAN ASSISTANT

## 2025-08-05 PROCEDURE — 83735 ASSAY OF MAGNESIUM: CPT

## 2025-08-05 PROCEDURE — 36415 COLL VENOUS BLD VENIPUNCTURE: CPT

## 2025-08-05 PROCEDURE — 2500000003 HC RX 250 WO HCPCS: Performed by: FAMILY MEDICINE

## 2025-08-05 PROCEDURE — 85025 COMPLETE CBC W/AUTO DIFF WBC: CPT

## 2025-08-05 PROCEDURE — 99232 SBSQ HOSP IP/OBS MODERATE 35: CPT | Performed by: FAMILY MEDICINE

## 2025-08-05 PROCEDURE — 6360000002 HC RX W HCPCS: Performed by: FAMILY MEDICINE

## 2025-08-05 PROCEDURE — 84100 ASSAY OF PHOSPHORUS: CPT

## 2025-08-05 PROCEDURE — 97530 THERAPEUTIC ACTIVITIES: CPT | Performed by: PHYSICAL THERAPIST

## 2025-08-05 PROCEDURE — 6370000000 HC RX 637 (ALT 250 FOR IP): Performed by: FAMILY MEDICINE

## 2025-08-05 PROCEDURE — 6370000000 HC RX 637 (ALT 250 FOR IP): Performed by: INTERNAL MEDICINE

## 2025-08-05 PROCEDURE — 97110 THERAPEUTIC EXERCISES: CPT | Performed by: PHYSICAL THERAPIST

## 2025-08-05 PROCEDURE — 80053 COMPREHEN METABOLIC PANEL: CPT

## 2025-08-05 PROCEDURE — 6370000000 HC RX 637 (ALT 250 FOR IP)

## 2025-08-05 RX ORDER — MAGNESIUM SULFATE IN WATER 40 MG/ML
2000 INJECTION, SOLUTION INTRAVENOUS ONCE
Status: COMPLETED | OUTPATIENT
Start: 2025-08-05 | End: 2025-08-05

## 2025-08-05 RX ORDER — LANOLIN ALCOHOL/MO/W.PET/CERES
400 CREAM (GRAM) TOPICAL 2 TIMES DAILY
Status: DISCONTINUED | OUTPATIENT
Start: 2025-08-05 | End: 2025-08-09 | Stop reason: HOSPADM

## 2025-08-05 RX ORDER — INSULIN GLARGINE 100 [IU]/ML
8 INJECTION, SOLUTION SUBCUTANEOUS 2 TIMES DAILY
Qty: 2 ADJUSTABLE DOSE PRE-FILLED PEN SYRINGE | Refills: 3 | Status: SHIPPED | OUTPATIENT
Start: 2025-08-05

## 2025-08-05 RX ORDER — INSULIN LISPRO 100 [IU]/ML
INJECTION, SOLUTION INTRAVENOUS; SUBCUTANEOUS
Qty: 2 ADJUSTABLE DOSE PRE-FILLED PEN SYRINGE | Refills: 1 | Status: SHIPPED | OUTPATIENT
Start: 2025-08-05

## 2025-08-05 RX ADMIN — MAGNESIUM SULFATE HEPTAHYDRATE 2000 MG: 40 INJECTION, SOLUTION INTRAVENOUS at 08:37

## 2025-08-05 RX ADMIN — FOLIC ACID 1 MG: 1 TABLET ORAL at 08:31

## 2025-08-05 RX ADMIN — ENOXAPARIN SODIUM 40 MG: 100 INJECTION SUBCUTANEOUS at 08:31

## 2025-08-05 RX ADMIN — PANTOPRAZOLE SODIUM 40 MG: 40 TABLET, DELAYED RELEASE ORAL at 05:05

## 2025-08-05 RX ADMIN — Medication 500 MG: at 19:46

## 2025-08-05 RX ADMIN — DOCUSATE SODIUM 100 MG: 100 CAPSULE, LIQUID FILLED ORAL at 19:46

## 2025-08-05 RX ADMIN — INSULIN LISPRO 2 UNITS: 100 INJECTION, SOLUTION INTRAVENOUS; SUBCUTANEOUS at 19:47

## 2025-08-05 RX ADMIN — GABAPENTIN 400 MG: 400 CAPSULE ORAL at 19:46

## 2025-08-05 RX ADMIN — DOCUSATE SODIUM 100 MG: 100 CAPSULE, LIQUID FILLED ORAL at 08:31

## 2025-08-05 RX ADMIN — GABAPENTIN 400 MG: 400 CAPSULE ORAL at 14:29

## 2025-08-05 RX ADMIN — DIAZEPAM 5 MG: 5 TABLET ORAL at 00:02

## 2025-08-05 RX ADMIN — Medication 1 TABLET: at 08:31

## 2025-08-05 RX ADMIN — Medication 400 MG: at 19:46

## 2025-08-05 RX ADMIN — Medication 400 MG: at 08:31

## 2025-08-05 RX ADMIN — GABAPENTIN 400 MG: 400 CAPSULE ORAL at 08:31

## 2025-08-05 RX ADMIN — Medication 10 ML: at 19:46

## 2025-08-05 RX ADMIN — Medication 500 MG: at 14:29

## 2025-08-05 RX ADMIN — Medication 10 ML: at 08:31

## 2025-08-05 RX ADMIN — DIAZEPAM 5 MG: 5 TABLET ORAL at 05:05

## 2025-08-05 RX ADMIN — Medication 500 MG: at 05:05

## 2025-08-05 RX ADMIN — INSULIN GLARGINE 6 UNITS: 100 INJECTION, SOLUTION SUBCUTANEOUS at 08:31

## 2025-08-05 ASSESSMENT — PAIN DESCRIPTION - LOCATION: LOCATION: ABDOMEN

## 2025-08-05 ASSESSMENT — PAIN SCALES - GENERAL
PAINLEVEL_OUTOF10: 5
PAINLEVEL_OUTOF10: 0
PAINLEVEL_OUTOF10: 3

## 2025-08-06 LAB
ALBUMIN SERPL-MCNC: 2.9 G/DL (ref 3.5–5.2)
ALP SERPL-CCNC: 160 U/L (ref 40–129)
ALT SERPL-CCNC: 32 U/L (ref 0–50)
ANION GAP SERPL CALCULATED.3IONS-SCNC: 10 MMOL/L (ref 7–16)
AST SERPL-CCNC: 64 U/L (ref 0–50)
BASOPHILS # BLD: 0.04 K/UL (ref 0–0.2)
BASOPHILS NFR BLD: 1 % (ref 0–2)
BILIRUB SERPL-MCNC: 0.9 MG/DL (ref 0–1.2)
BUN SERPL-MCNC: 8 MG/DL (ref 6–20)
CALCIUM SERPL-MCNC: 9 MG/DL (ref 8.6–10)
CHLORIDE SERPL-SCNC: 101 MMOL/L (ref 98–107)
CO2 SERPL-SCNC: 25 MMOL/L (ref 22–29)
CREAT SERPL-MCNC: 0.7 MG/DL (ref 0.7–1.2)
EOSINOPHIL # BLD: 0.06 K/UL (ref 0.05–0.5)
EOSINOPHILS RELATIVE PERCENT: 1 % (ref 0–6)
ERYTHROCYTE [DISTWIDTH] IN BLOOD BY AUTOMATED COUNT: 15 % (ref 11.5–15)
GFR, ESTIMATED: >90 ML/MIN/1.73M2
GLUCOSE BLD-MCNC: 130 MG/DL (ref 74–99)
GLUCOSE BLD-MCNC: 161 MG/DL (ref 74–99)
GLUCOSE BLD-MCNC: 182 MG/DL (ref 74–99)
GLUCOSE BLD-MCNC: 188 MG/DL (ref 74–99)
GLUCOSE SERPL-MCNC: 138 MG/DL (ref 74–99)
HCT VFR BLD AUTO: 26.4 % (ref 37–54)
HGB BLD-MCNC: 8.7 G/DL (ref 12.5–16.5)
IMM GRANULOCYTES # BLD AUTO: 0.13 K/UL (ref 0–0.58)
IMM GRANULOCYTES NFR BLD: 2 % (ref 0–5)
LYMPHOCYTES NFR BLD: 1.12 K/UL (ref 1.5–4)
LYMPHOCYTES RELATIVE PERCENT: 19 % (ref 20–42)
MAGNESIUM SERPL-MCNC: 1.4 MG/DL (ref 1.6–2.6)
MCH RBC QN AUTO: 34.5 PG (ref 26–35)
MCHC RBC AUTO-ENTMCNC: 33 G/DL (ref 32–34.5)
MCV RBC AUTO: 104.8 FL (ref 80–99.9)
MONOCYTES NFR BLD: 1.25 K/UL (ref 0.1–0.95)
MONOCYTES NFR BLD: 22 % (ref 2–12)
NEUTROPHILS NFR BLD: 55 % (ref 43–80)
NEUTS SEG NFR BLD: 3.22 K/UL (ref 1.8–7.3)
PHOSPHATE SERPL-MCNC: 5.2 MG/DL (ref 2.5–4.5)
PLATELET # BLD AUTO: 293 K/UL (ref 130–450)
PMV BLD AUTO: 10 FL (ref 7–12)
POTASSIUM SERPL-SCNC: 4.4 MMOL/L (ref 3.5–5.1)
PROT SERPL-MCNC: 5.6 G/DL (ref 6.4–8.3)
RBC # BLD AUTO: 2.52 M/UL (ref 3.8–5.8)
SODIUM SERPL-SCNC: 136 MMOL/L (ref 136–145)
WBC OTHER # BLD: 5.8 K/UL (ref 4.5–11.5)

## 2025-08-06 PROCEDURE — 36415 COLL VENOUS BLD VENIPUNCTURE: CPT

## 2025-08-06 PROCEDURE — 2500000003 HC RX 250 WO HCPCS: Performed by: FAMILY MEDICINE

## 2025-08-06 PROCEDURE — 82962 GLUCOSE BLOOD TEST: CPT

## 2025-08-06 PROCEDURE — 6370000000 HC RX 637 (ALT 250 FOR IP): Performed by: FAMILY MEDICINE

## 2025-08-06 PROCEDURE — 6370000000 HC RX 637 (ALT 250 FOR IP): Performed by: INTERNAL MEDICINE

## 2025-08-06 PROCEDURE — 83735 ASSAY OF MAGNESIUM: CPT

## 2025-08-06 PROCEDURE — 80053 COMPREHEN METABOLIC PANEL: CPT

## 2025-08-06 PROCEDURE — 6370000000 HC RX 637 (ALT 250 FOR IP): Performed by: PHYSICIAN ASSISTANT

## 2025-08-06 PROCEDURE — 85025 COMPLETE CBC W/AUTO DIFF WBC: CPT

## 2025-08-06 PROCEDURE — 6360000002 HC RX W HCPCS: Performed by: FAMILY MEDICINE

## 2025-08-06 PROCEDURE — 84100 ASSAY OF PHOSPHORUS: CPT

## 2025-08-06 PROCEDURE — 6370000000 HC RX 637 (ALT 250 FOR IP)

## 2025-08-06 PROCEDURE — 1200000000 HC SEMI PRIVATE

## 2025-08-06 RX ORDER — MAGNESIUM SULFATE IN WATER 40 MG/ML
4000 INJECTION, SOLUTION INTRAVENOUS ONCE
Status: COMPLETED | OUTPATIENT
Start: 2025-08-06 | End: 2025-08-06

## 2025-08-06 RX ADMIN — GABAPENTIN 400 MG: 400 CAPSULE ORAL at 14:36

## 2025-08-06 RX ADMIN — Medication 400 MG: at 09:56

## 2025-08-06 RX ADMIN — Medication 500 MG: at 14:36

## 2025-08-06 RX ADMIN — PANTOPRAZOLE SODIUM 40 MG: 40 TABLET, DELAYED RELEASE ORAL at 05:26

## 2025-08-06 RX ADMIN — DOCUSATE SODIUM 100 MG: 100 CAPSULE, LIQUID FILLED ORAL at 19:22

## 2025-08-06 RX ADMIN — MAGNESIUM SULFATE HEPTAHYDRATE 4000 MG: 40 INJECTION, SOLUTION INTRAVENOUS at 10:02

## 2025-08-06 RX ADMIN — Medication 10 ML: at 09:56

## 2025-08-06 RX ADMIN — ENOXAPARIN SODIUM 40 MG: 100 INJECTION SUBCUTANEOUS at 09:56

## 2025-08-06 RX ADMIN — INSULIN LISPRO 2 UNITS: 100 INJECTION, SOLUTION INTRAVENOUS; SUBCUTANEOUS at 19:22

## 2025-08-06 RX ADMIN — INSULIN GLARGINE 6 UNITS: 100 INJECTION, SOLUTION SUBCUTANEOUS at 09:58

## 2025-08-06 RX ADMIN — INSULIN LISPRO 2 UNITS: 100 INJECTION, SOLUTION INTRAVENOUS; SUBCUTANEOUS at 16:37

## 2025-08-06 RX ADMIN — GABAPENTIN 400 MG: 400 CAPSULE ORAL at 09:56

## 2025-08-06 RX ADMIN — Medication 400 MG: at 19:22

## 2025-08-06 RX ADMIN — DOCUSATE SODIUM 100 MG: 100 CAPSULE, LIQUID FILLED ORAL at 09:56

## 2025-08-06 RX ADMIN — GABAPENTIN 400 MG: 400 CAPSULE ORAL at 19:22

## 2025-08-06 RX ADMIN — FOLIC ACID 1 MG: 1 TABLET ORAL at 09:56

## 2025-08-06 RX ADMIN — Medication 500 MG: at 19:22

## 2025-08-06 RX ADMIN — Medication 1 TABLET: at 09:56

## 2025-08-06 RX ADMIN — Medication 500 MG: at 05:26

## 2025-08-07 LAB
ALBUMIN SERPL-MCNC: 3 G/DL (ref 3.5–5.2)
ALP SERPL-CCNC: 173 U/L (ref 40–129)
ALT SERPL-CCNC: 28 U/L (ref 0–50)
ANION GAP SERPL CALCULATED.3IONS-SCNC: 11 MMOL/L (ref 7–16)
AST SERPL-CCNC: 60 U/L (ref 0–50)
BASOPHILS # BLD: 0.08 K/UL (ref 0–0.2)
BASOPHILS NFR BLD: 1 % (ref 0–2)
BILIRUB SERPL-MCNC: 0.9 MG/DL (ref 0–1.2)
BUN SERPL-MCNC: 9 MG/DL (ref 6–20)
CALCIUM SERPL-MCNC: 9.2 MG/DL (ref 8.6–10)
CHLORIDE SERPL-SCNC: 101 MMOL/L (ref 98–107)
CO2 SERPL-SCNC: 25 MMOL/L (ref 22–29)
CREAT SERPL-MCNC: 0.9 MG/DL (ref 0.7–1.2)
EOSINOPHIL # BLD: 0.11 K/UL (ref 0.05–0.5)
EOSINOPHILS RELATIVE PERCENT: 2 % (ref 0–6)
ERYTHROCYTE [DISTWIDTH] IN BLOOD BY AUTOMATED COUNT: 14.7 % (ref 11.5–15)
GFR, ESTIMATED: >90 ML/MIN/1.73M2
GLUCOSE BLD-MCNC: 111 MG/DL (ref 74–99)
GLUCOSE BLD-MCNC: 141 MG/DL (ref 74–99)
GLUCOSE BLD-MCNC: 167 MG/DL (ref 74–99)
GLUCOSE BLD-MCNC: 190 MG/DL (ref 74–99)
GLUCOSE SERPL-MCNC: 110 MG/DL (ref 74–99)
HCT VFR BLD AUTO: 29.7 % (ref 37–54)
HGB BLD-MCNC: 9.5 G/DL (ref 12.5–16.5)
IMM GRANULOCYTES # BLD AUTO: 0.12 K/UL (ref 0–0.58)
IMM GRANULOCYTES NFR BLD: 2 % (ref 0–5)
LYMPHOCYTES NFR BLD: 1.26 K/UL (ref 1.5–4)
LYMPHOCYTES RELATIVE PERCENT: 19 % (ref 20–42)
MAGNESIUM SERPL-MCNC: 1.7 MG/DL (ref 1.6–2.6)
MCH RBC QN AUTO: 34.4 PG (ref 26–35)
MCHC RBC AUTO-ENTMCNC: 32 G/DL (ref 32–34.5)
MCV RBC AUTO: 107.6 FL (ref 80–99.9)
MONOCYTES NFR BLD: 1.25 K/UL (ref 0.1–0.95)
MONOCYTES NFR BLD: 19 % (ref 2–12)
NEUTROPHILS NFR BLD: 57 % (ref 43–80)
NEUTS SEG NFR BLD: 3.76 K/UL (ref 1.8–7.3)
PHOSPHATE SERPL-MCNC: 4.7 MG/DL (ref 2.5–4.5)
PLATELET # BLD AUTO: 322 K/UL (ref 130–450)
PMV BLD AUTO: 9.8 FL (ref 7–12)
POTASSIUM SERPL-SCNC: 4.7 MMOL/L (ref 3.5–5.1)
PROT SERPL-MCNC: 6 G/DL (ref 6.4–8.3)
RBC # BLD AUTO: 2.76 M/UL (ref 3.8–5.8)
SODIUM SERPL-SCNC: 137 MMOL/L (ref 136–145)
WBC OTHER # BLD: 6.6 K/UL (ref 4.5–11.5)

## 2025-08-07 PROCEDURE — 6360000002 HC RX W HCPCS: Performed by: INTERNAL MEDICINE

## 2025-08-07 PROCEDURE — 6370000000 HC RX 637 (ALT 250 FOR IP)

## 2025-08-07 PROCEDURE — 97110 THERAPEUTIC EXERCISES: CPT | Performed by: PHYSICAL THERAPIST

## 2025-08-07 PROCEDURE — 80053 COMPREHEN METABOLIC PANEL: CPT

## 2025-08-07 PROCEDURE — 83735 ASSAY OF MAGNESIUM: CPT

## 2025-08-07 PROCEDURE — 97535 SELF CARE MNGMENT TRAINING: CPT

## 2025-08-07 PROCEDURE — 85025 COMPLETE CBC W/AUTO DIFF WBC: CPT

## 2025-08-07 PROCEDURE — 6370000000 HC RX 637 (ALT 250 FOR IP): Performed by: PHYSICIAN ASSISTANT

## 2025-08-07 PROCEDURE — 6370000000 HC RX 637 (ALT 250 FOR IP): Performed by: INTERNAL MEDICINE

## 2025-08-07 PROCEDURE — 97530 THERAPEUTIC ACTIVITIES: CPT

## 2025-08-07 PROCEDURE — 6370000000 HC RX 637 (ALT 250 FOR IP): Performed by: FAMILY MEDICINE

## 2025-08-07 PROCEDURE — 84100 ASSAY OF PHOSPHORUS: CPT

## 2025-08-07 PROCEDURE — 82962 GLUCOSE BLOOD TEST: CPT

## 2025-08-07 PROCEDURE — 36415 COLL VENOUS BLD VENIPUNCTURE: CPT

## 2025-08-07 PROCEDURE — 2500000003 HC RX 250 WO HCPCS: Performed by: FAMILY MEDICINE

## 2025-08-07 PROCEDURE — 6360000002 HC RX W HCPCS: Performed by: FAMILY MEDICINE

## 2025-08-07 PROCEDURE — 97530 THERAPEUTIC ACTIVITIES: CPT | Performed by: PHYSICAL THERAPIST

## 2025-08-07 PROCEDURE — 1200000000 HC SEMI PRIVATE

## 2025-08-07 RX ORDER — MAGNESIUM SULFATE IN WATER 40 MG/ML
2000 INJECTION, SOLUTION INTRAVENOUS ONCE
Status: COMPLETED | OUTPATIENT
Start: 2025-08-07 | End: 2025-08-07

## 2025-08-07 RX ADMIN — GABAPENTIN 400 MG: 400 CAPSULE ORAL at 14:29

## 2025-08-07 RX ADMIN — Medication 10 ML: at 10:06

## 2025-08-07 RX ADMIN — MIDAZOLAM 1 MG: 1 INJECTION INTRAMUSCULAR; INTRAVENOUS at 22:19

## 2025-08-07 RX ADMIN — Medication 400 MG: at 19:55

## 2025-08-07 RX ADMIN — DOCUSATE SODIUM 100 MG: 100 CAPSULE, LIQUID FILLED ORAL at 08:59

## 2025-08-07 RX ADMIN — MAGNESIUM SULFATE HEPTAHYDRATE 2000 MG: 40 INJECTION, SOLUTION INTRAVENOUS at 10:05

## 2025-08-07 RX ADMIN — PANTOPRAZOLE SODIUM 40 MG: 40 TABLET, DELAYED RELEASE ORAL at 05:42

## 2025-08-07 RX ADMIN — Medication 500 MG: at 05:42

## 2025-08-07 RX ADMIN — Medication 10 ML: at 22:19

## 2025-08-07 RX ADMIN — ENOXAPARIN SODIUM 40 MG: 100 INJECTION SUBCUTANEOUS at 08:59

## 2025-08-07 RX ADMIN — INSULIN LISPRO 2 UNITS: 100 INJECTION, SOLUTION INTRAVENOUS; SUBCUTANEOUS at 16:32

## 2025-08-07 RX ADMIN — GABAPENTIN 400 MG: 400 CAPSULE ORAL at 19:55

## 2025-08-07 RX ADMIN — GABAPENTIN 400 MG: 400 CAPSULE ORAL at 08:59

## 2025-08-07 RX ADMIN — INSULIN GLARGINE 6 UNITS: 100 INJECTION, SOLUTION SUBCUTANEOUS at 08:58

## 2025-08-07 RX ADMIN — Medication 400 MG: at 08:59

## 2025-08-07 RX ADMIN — Medication 500 MG: at 14:29

## 2025-08-07 RX ADMIN — Medication 1 TABLET: at 08:59

## 2025-08-07 RX ADMIN — FOLIC ACID 1 MG: 1 TABLET ORAL at 08:59

## 2025-08-07 RX ADMIN — Medication 500 MG: at 19:55

## 2025-08-08 LAB
ALBUMIN SERPL-MCNC: 3 G/DL (ref 3.5–5.2)
ALP SERPL-CCNC: 168 U/L (ref 40–129)
ALT SERPL-CCNC: 26 U/L (ref 0–50)
ANION GAP SERPL CALCULATED.3IONS-SCNC: 11 MMOL/L (ref 7–16)
AST SERPL-CCNC: 56 U/L (ref 0–50)
BASOPHILS # BLD: 0.07 K/UL (ref 0–0.2)
BASOPHILS NFR BLD: 1 % (ref 0–2)
BILIRUB SERPL-MCNC: 0.8 MG/DL (ref 0–1.2)
BUN SERPL-MCNC: 10 MG/DL (ref 6–20)
CALCIUM SERPL-MCNC: 8.8 MG/DL (ref 8.6–10)
CHLORIDE SERPL-SCNC: 102 MMOL/L (ref 98–107)
CO2 SERPL-SCNC: 24 MMOL/L (ref 22–29)
CREAT SERPL-MCNC: 0.9 MG/DL (ref 0.7–1.2)
EOSINOPHIL # BLD: 0.12 K/UL (ref 0.05–0.5)
EOSINOPHILS RELATIVE PERCENT: 2 % (ref 0–6)
ERYTHROCYTE [DISTWIDTH] IN BLOOD BY AUTOMATED COUNT: 14 % (ref 11.5–15)
GFR, ESTIMATED: >90 ML/MIN/1.73M2
GLUCOSE BLD-MCNC: 150 MG/DL (ref 74–99)
GLUCOSE BLD-MCNC: 164 MG/DL (ref 74–99)
GLUCOSE BLD-MCNC: 192 MG/DL (ref 74–99)
GLUCOSE SERPL-MCNC: 129 MG/DL (ref 74–99)
HCT VFR BLD AUTO: 27.4 % (ref 37–54)
HGB BLD-MCNC: 8.8 G/DL (ref 12.5–16.5)
IMM GRANULOCYTES # BLD AUTO: 0.1 K/UL (ref 0–0.58)
IMM GRANULOCYTES NFR BLD: 2 % (ref 0–5)
LYMPHOCYTES NFR BLD: 1.38 K/UL (ref 1.5–4)
LYMPHOCYTES RELATIVE PERCENT: 22 % (ref 20–42)
MAGNESIUM SERPL-MCNC: 1.6 MG/DL (ref 1.6–2.6)
MCH RBC QN AUTO: 33.8 PG (ref 26–35)
MCHC RBC AUTO-ENTMCNC: 32.1 G/DL (ref 32–34.5)
MCV RBC AUTO: 105.4 FL (ref 80–99.9)
MONOCYTES NFR BLD: 1.07 K/UL (ref 0.1–0.95)
MONOCYTES NFR BLD: 17 % (ref 2–12)
NEUTROPHILS NFR BLD: 57 % (ref 43–80)
NEUTS SEG NFR BLD: 3.66 K/UL (ref 1.8–7.3)
PHOSPHATE SERPL-MCNC: 4.5 MG/DL (ref 2.5–4.5)
PLATELET # BLD AUTO: 349 K/UL (ref 130–450)
PMV BLD AUTO: 10.2 FL (ref 7–12)
POTASSIUM SERPL-SCNC: 4.5 MMOL/L (ref 3.5–5.1)
PROT SERPL-MCNC: 6 G/DL (ref 6.4–8.3)
RBC # BLD AUTO: 2.6 M/UL (ref 3.8–5.8)
SODIUM SERPL-SCNC: 137 MMOL/L (ref 136–145)
WBC OTHER # BLD: 6.4 K/UL (ref 4.5–11.5)

## 2025-08-08 PROCEDURE — 97535 SELF CARE MNGMENT TRAINING: CPT

## 2025-08-08 PROCEDURE — 84100 ASSAY OF PHOSPHORUS: CPT

## 2025-08-08 PROCEDURE — 83735 ASSAY OF MAGNESIUM: CPT

## 2025-08-08 PROCEDURE — 85025 COMPLETE CBC W/AUTO DIFF WBC: CPT

## 2025-08-08 PROCEDURE — 6360000002 HC RX W HCPCS: Performed by: FAMILY MEDICINE

## 2025-08-08 PROCEDURE — 6370000000 HC RX 637 (ALT 250 FOR IP): Performed by: FAMILY MEDICINE

## 2025-08-08 PROCEDURE — 97530 THERAPEUTIC ACTIVITIES: CPT

## 2025-08-08 PROCEDURE — 82962 GLUCOSE BLOOD TEST: CPT

## 2025-08-08 PROCEDURE — 6370000000 HC RX 637 (ALT 250 FOR IP): Performed by: INTERNAL MEDICINE

## 2025-08-08 PROCEDURE — 2500000003 HC RX 250 WO HCPCS: Performed by: FAMILY MEDICINE

## 2025-08-08 PROCEDURE — 6370000000 HC RX 637 (ALT 250 FOR IP)

## 2025-08-08 PROCEDURE — 36415 COLL VENOUS BLD VENIPUNCTURE: CPT

## 2025-08-08 PROCEDURE — 6370000000 HC RX 637 (ALT 250 FOR IP): Performed by: PHYSICIAN ASSISTANT

## 2025-08-08 PROCEDURE — 6360000002 HC RX W HCPCS: Performed by: INTERNAL MEDICINE

## 2025-08-08 PROCEDURE — 80053 COMPREHEN METABOLIC PANEL: CPT

## 2025-08-08 PROCEDURE — 1200000000 HC SEMI PRIVATE

## 2025-08-08 RX ORDER — CLOTRIMAZOLE AND BETAMETHASONE DIPROPIONATE 10; .64 MG/G; MG/G
CREAM TOPICAL 2 TIMES DAILY
Status: DISCONTINUED | OUTPATIENT
Start: 2025-08-08 | End: 2025-08-09 | Stop reason: HOSPADM

## 2025-08-08 RX ORDER — MAGNESIUM SULFATE IN WATER 40 MG/ML
2000 INJECTION, SOLUTION INTRAVENOUS ONCE
Status: COMPLETED | OUTPATIENT
Start: 2025-08-08 | End: 2025-08-08

## 2025-08-08 RX ADMIN — Medication 1 TABLET: at 08:30

## 2025-08-08 RX ADMIN — DOCUSATE SODIUM 100 MG: 100 CAPSULE, LIQUID FILLED ORAL at 08:30

## 2025-08-08 RX ADMIN — ENOXAPARIN SODIUM 40 MG: 100 INJECTION SUBCUTANEOUS at 08:29

## 2025-08-08 RX ADMIN — INSULIN LISPRO 2 UNITS: 100 INJECTION, SOLUTION INTRAVENOUS; SUBCUTANEOUS at 17:27

## 2025-08-08 RX ADMIN — GABAPENTIN 400 MG: 400 CAPSULE ORAL at 13:54

## 2025-08-08 RX ADMIN — Medication 500 MG: at 13:54

## 2025-08-08 RX ADMIN — Medication 500 MG: at 22:20

## 2025-08-08 RX ADMIN — Medication 400 MG: at 22:14

## 2025-08-08 RX ADMIN — Medication 10 ML: at 10:54

## 2025-08-08 RX ADMIN — TRAMADOL HYDROCHLORIDE 50 MG: 50 TABLET, COATED ORAL at 22:14

## 2025-08-08 RX ADMIN — Medication 400 MG: at 08:30

## 2025-08-08 RX ADMIN — FOLIC ACID 1 MG: 1 TABLET ORAL at 08:30

## 2025-08-08 RX ADMIN — INSULIN GLARGINE 6 UNITS: 100 INJECTION, SOLUTION SUBCUTANEOUS at 09:54

## 2025-08-08 RX ADMIN — CLOTRIMAZOLE AND BETAMETHASONE DIPROPIONATE: 10; .5 CREAM TOPICAL at 22:17

## 2025-08-08 RX ADMIN — PANTOPRAZOLE SODIUM 40 MG: 40 TABLET, DELAYED RELEASE ORAL at 05:39

## 2025-08-08 RX ADMIN — MIDAZOLAM 1 MG: 1 INJECTION INTRAMUSCULAR; INTRAVENOUS at 22:20

## 2025-08-08 RX ADMIN — Medication 500 MG: at 05:38

## 2025-08-08 RX ADMIN — GABAPENTIN 400 MG: 400 CAPSULE ORAL at 22:14

## 2025-08-08 RX ADMIN — GABAPENTIN 400 MG: 400 CAPSULE ORAL at 08:30

## 2025-08-08 RX ADMIN — MAGNESIUM SULFATE HEPTAHYDRATE 2000 MG: 40 INJECTION, SOLUTION INTRAVENOUS at 08:38

## 2025-08-08 ASSESSMENT — PAIN DESCRIPTION - LOCATION: LOCATION: ABDOMEN

## 2025-08-08 ASSESSMENT — PAIN SCALES - GENERAL
PAINLEVEL_OUTOF10: 7
PAINLEVEL_OUTOF10: 3

## 2025-08-08 ASSESSMENT — PAIN DESCRIPTION - DESCRIPTORS: DESCRIPTORS: THROBBING

## 2025-08-08 ASSESSMENT — PAIN SCALES - WONG BAKER: WONGBAKER_NUMERICALRESPONSE: NO HURT

## 2025-08-09 VITALS
SYSTOLIC BLOOD PRESSURE: 136 MMHG | TEMPERATURE: 98.8 F | HEIGHT: 68 IN | RESPIRATION RATE: 18 BRPM | OXYGEN SATURATION: 95 % | HEART RATE: 69 BPM | WEIGHT: 204.37 LBS | BODY MASS INDEX: 30.97 KG/M2 | DIASTOLIC BLOOD PRESSURE: 77 MMHG

## 2025-08-09 LAB
ALBUMIN SERPL-MCNC: 3.2 G/DL (ref 3.5–5.2)
ALP SERPL-CCNC: 178 U/L (ref 40–129)
ALT SERPL-CCNC: 26 U/L (ref 0–50)
ANION GAP SERPL CALCULATED.3IONS-SCNC: 11 MMOL/L (ref 7–16)
AST SERPL-CCNC: 60 U/L (ref 0–50)
BASOPHILS # BLD: 0.1 K/UL (ref 0–0.2)
BASOPHILS NFR BLD: 1 % (ref 0–2)
BILIRUB SERPL-MCNC: 0.8 MG/DL (ref 0–1.2)
BUN SERPL-MCNC: 11 MG/DL (ref 6–20)
CALCIUM SERPL-MCNC: 9 MG/DL (ref 8.6–10)
CHLORIDE SERPL-SCNC: 101 MMOL/L (ref 98–107)
CO2 SERPL-SCNC: 24 MMOL/L (ref 22–29)
CREAT SERPL-MCNC: 0.8 MG/DL (ref 0.7–1.2)
EOSINOPHIL # BLD: 0.11 K/UL (ref 0.05–0.5)
EOSINOPHILS RELATIVE PERCENT: 2 % (ref 0–6)
ERYTHROCYTE [DISTWIDTH] IN BLOOD BY AUTOMATED COUNT: 13.7 % (ref 11.5–15)
GFR, ESTIMATED: >90 ML/MIN/1.73M2
GLUCOSE BLD-MCNC: 153 MG/DL (ref 74–99)
GLUCOSE BLD-MCNC: 195 MG/DL (ref 74–99)
GLUCOSE SERPL-MCNC: 120 MG/DL (ref 74–99)
HCT VFR BLD AUTO: 28.4 % (ref 37–54)
HGB BLD-MCNC: 9.5 G/DL (ref 12.5–16.5)
IMM GRANULOCYTES # BLD AUTO: 0.08 K/UL (ref 0–0.58)
IMM GRANULOCYTES NFR BLD: 1 % (ref 0–5)
LYMPHOCYTES NFR BLD: 1.45 K/UL (ref 1.5–4)
LYMPHOCYTES RELATIVE PERCENT: 20 % (ref 20–42)
MAGNESIUM SERPL-MCNC: 1.6 MG/DL (ref 1.6–2.6)
MCH RBC QN AUTO: 34.5 PG (ref 26–35)
MCHC RBC AUTO-ENTMCNC: 33.5 G/DL (ref 32–34.5)
MCV RBC AUTO: 103.3 FL (ref 80–99.9)
MONOCYTES NFR BLD: 1.07 K/UL (ref 0.1–0.95)
MONOCYTES NFR BLD: 15 % (ref 2–12)
NEUTROPHILS NFR BLD: 62 % (ref 43–80)
NEUTS SEG NFR BLD: 4.51 K/UL (ref 1.8–7.3)
PHOSPHATE SERPL-MCNC: 4.3 MG/DL (ref 2.5–4.5)
PLATELET # BLD AUTO: 389 K/UL (ref 130–450)
PMV BLD AUTO: 10.5 FL (ref 7–12)
POTASSIUM SERPL-SCNC: 4.5 MMOL/L (ref 3.5–5.1)
PROT SERPL-MCNC: 6.4 G/DL (ref 6.4–8.3)
RBC # BLD AUTO: 2.75 M/UL (ref 3.8–5.8)
SODIUM SERPL-SCNC: 136 MMOL/L (ref 136–145)
WBC OTHER # BLD: 7.3 K/UL (ref 4.5–11.5)

## 2025-08-09 PROCEDURE — 80053 COMPREHEN METABOLIC PANEL: CPT

## 2025-08-09 PROCEDURE — 85025 COMPLETE CBC W/AUTO DIFF WBC: CPT

## 2025-08-09 PROCEDURE — 36415 COLL VENOUS BLD VENIPUNCTURE: CPT

## 2025-08-09 PROCEDURE — 6370000000 HC RX 637 (ALT 250 FOR IP): Performed by: FAMILY MEDICINE

## 2025-08-09 PROCEDURE — 84100 ASSAY OF PHOSPHORUS: CPT

## 2025-08-09 PROCEDURE — 6360000002 HC RX W HCPCS: Performed by: FAMILY MEDICINE

## 2025-08-09 PROCEDURE — 82962 GLUCOSE BLOOD TEST: CPT

## 2025-08-09 PROCEDURE — 6370000000 HC RX 637 (ALT 250 FOR IP)

## 2025-08-09 PROCEDURE — 83735 ASSAY OF MAGNESIUM: CPT

## 2025-08-09 PROCEDURE — 6370000000 HC RX 637 (ALT 250 FOR IP): Performed by: INTERNAL MEDICINE

## 2025-08-09 PROCEDURE — 2500000003 HC RX 250 WO HCPCS: Performed by: FAMILY MEDICINE

## 2025-08-09 PROCEDURE — 6370000000 HC RX 637 (ALT 250 FOR IP): Performed by: PHYSICIAN ASSISTANT

## 2025-08-09 RX ORDER — TRAMADOL HYDROCHLORIDE 50 MG/1
50 TABLET ORAL EVERY 6 HOURS PRN
Qty: 28 TABLET | Refills: 0 | Status: SHIPPED | OUTPATIENT
Start: 2025-08-09 | End: 2025-08-16

## 2025-08-09 RX ORDER — CLOTRIMAZOLE AND BETAMETHASONE DIPROPIONATE 10; .64 MG/G; MG/G
CREAM TOPICAL
DISCHARGE
Start: 2025-08-09

## 2025-08-09 RX ORDER — GABAPENTIN 400 MG/1
400 CAPSULE ORAL 3 TIMES DAILY
Qty: 30 CAPSULE | Refills: 0 | Status: SHIPPED | OUTPATIENT
Start: 2025-08-09 | End: 2025-08-19

## 2025-08-09 RX ORDER — DIAZEPAM 5 MG/1
5 TABLET ORAL EVERY 6 HOURS PRN
Qty: 40 TABLET | Refills: 0 | Status: SHIPPED | OUTPATIENT
Start: 2025-08-09 | End: 2025-08-19

## 2025-08-09 RX ADMIN — FOLIC ACID 1 MG: 1 TABLET ORAL at 09:27

## 2025-08-09 RX ADMIN — GABAPENTIN 400 MG: 400 CAPSULE ORAL at 09:27

## 2025-08-09 RX ADMIN — Medication 10 ML: at 09:33

## 2025-08-09 RX ADMIN — DOCUSATE SODIUM 100 MG: 100 CAPSULE, LIQUID FILLED ORAL at 09:28

## 2025-08-09 RX ADMIN — ENOXAPARIN SODIUM 40 MG: 100 INJECTION SUBCUTANEOUS at 09:28

## 2025-08-09 RX ADMIN — Medication 500 MG: at 06:27

## 2025-08-09 RX ADMIN — CLOTRIMAZOLE AND BETAMETHASONE DIPROPIONATE: 10; .5 CREAM TOPICAL at 09:29

## 2025-08-09 RX ADMIN — Medication 1 TABLET: at 09:28

## 2025-08-09 RX ADMIN — INSULIN GLARGINE 6 UNITS: 100 INJECTION, SOLUTION SUBCUTANEOUS at 09:30

## 2025-08-09 RX ADMIN — INSULIN LISPRO 2 UNITS: 100 INJECTION, SOLUTION INTRAVENOUS; SUBCUTANEOUS at 11:36

## 2025-08-09 RX ADMIN — GABAPENTIN 400 MG: 400 CAPSULE ORAL at 14:49

## 2025-08-09 RX ADMIN — Medication 400 MG: at 09:28

## 2025-08-09 RX ADMIN — PANTOPRAZOLE SODIUM 40 MG: 40 TABLET, DELAYED RELEASE ORAL at 06:27

## 2025-08-09 RX ADMIN — Medication 500 MG: at 14:48

## 2025-08-09 ASSESSMENT — PAIN SCALES - GENERAL
PAINLEVEL_OUTOF10: 0

## 2025-08-19 ENCOUNTER — TELEPHONE (OUTPATIENT)
Dept: INFUSION THERAPY | Age: 55
End: 2025-08-19